# Patient Record
Sex: FEMALE | Race: WHITE | Employment: OTHER | URBAN - METROPOLITAN AREA
[De-identification: names, ages, dates, MRNs, and addresses within clinical notes are randomized per-mention and may not be internally consistent; named-entity substitution may affect disease eponyms.]

---

## 2017-01-03 RX ORDER — CLOPIDOGREL BISULFATE 75 MG/1
75 TABLET ORAL DAILY
Qty: 30 TAB | Refills: 6 | Status: SHIPPED | OUTPATIENT
Start: 2017-01-03 | End: 2017-06-15 | Stop reason: SDUPTHER

## 2017-03-13 RX ORDER — METOPROLOL TARTRATE 50 MG/1
50 TABLET ORAL 2 TIMES DAILY
Qty: 60 TAB | Refills: 6 | Status: SHIPPED | OUTPATIENT
Start: 2017-03-13 | End: 2018-04-17 | Stop reason: ALTCHOICE

## 2017-04-11 RX ORDER — AMLODIPINE BESYLATE 5 MG/1
5 TABLET ORAL DAILY
Qty: 30 TAB | Refills: 6 | Status: SHIPPED | OUTPATIENT
Start: 2017-04-11 | End: 2017-07-10 | Stop reason: SDUPTHER

## 2017-04-11 RX ORDER — SIMVASTATIN 40 MG/1
40 TABLET, FILM COATED ORAL
Qty: 30 TAB | Refills: 6 | Status: SHIPPED | OUTPATIENT
Start: 2017-04-11 | End: 2017-08-03 | Stop reason: SDUPTHER

## 2017-05-20 LAB
ALBUMIN SERPL-MCNC: 4.5 G/DL (ref 3.6–4.8)
ALP SERPL-CCNC: 66 IU/L (ref 39–117)
ALT SERPL-CCNC: 12 IU/L (ref 0–32)
AST SERPL-CCNC: 16 IU/L (ref 0–40)
BILIRUB DIRECT SERPL-MCNC: 0.18 MG/DL (ref 0–0.4)
BILIRUB SERPL-MCNC: 0.6 MG/DL (ref 0–1.2)
CHOLEST SERPL-MCNC: 183 MG/DL (ref 100–199)
HDLC SERPL-MCNC: 54 MG/DL
LDLC SERPL CALC-MCNC: 86 MG/DL (ref 0–99)
PROT SERPL-MCNC: 6.9 G/DL (ref 6–8.5)
TRIGL SERPL-MCNC: 216 MG/DL (ref 0–149)
TSH SERPL DL<=0.005 MIU/L-ACNC: 3.03 UIU/ML (ref 0.45–4.5)
VLDLC SERPL CALC-MCNC: 43 MG/DL (ref 5–40)

## 2017-06-06 ENCOUNTER — OFFICE VISIT (OUTPATIENT)
Dept: CARDIOLOGY CLINIC | Age: 62
End: 2017-06-06

## 2017-06-06 VITALS — DIASTOLIC BLOOD PRESSURE: 73 MMHG | HEART RATE: 63 BPM | HEIGHT: 66 IN | SYSTOLIC BLOOD PRESSURE: 110 MMHG

## 2017-06-06 DIAGNOSIS — I10 ESSENTIAL HYPERTENSION: Chronic | ICD-10-CM

## 2017-06-06 DIAGNOSIS — Z95.5 PRESENCE OF STENT IN LAD CORONARY ARTERY: ICD-10-CM

## 2017-06-06 DIAGNOSIS — I25.118 CORONARY ARTERY DISEASE OF NATIVE ARTERY OF NATIVE HEART WITH STABLE ANGINA PECTORIS (HCC): Primary | ICD-10-CM

## 2017-06-06 DIAGNOSIS — I51.9 LV DYSFUNCTION: ICD-10-CM

## 2017-06-06 DIAGNOSIS — E78.5 HYPERLIPIDEMIA, UNSPECIFIED HYPERLIPIDEMIA TYPE: Chronic | ICD-10-CM

## 2017-06-06 RX ORDER — GLUCOSAM/CHONDRO/HERB 149/HYAL 750-100 MG
TABLET ORAL
COMMUNITY
End: 2018-09-27

## 2017-06-06 NOTE — PROGRESS NOTES
HISTORY OF PRESENT ILLNESS  Imelda You is a 57 y.o. female. Hypertension   The history is provided by the patient. This is a chronic problem. The current episode started more than 1 week ago. Associated symptoms include chest pain and shortness of breath. Chest Pain    The history is provided by the patient. This is a chronic problem. The current episode started more than 1 week ago. The problem has been rapidly improving. The problem occurs rarely. The pain is associated with exertion and normal activity. The pain is present in the substernal region. The pain is at a severity of 1/10. The pain is mild. The quality of the pain is described as tightness. The pain does not radiate. Associated symptoms include shortness of breath. Pertinent negatives include no claudication, no cough, no diaphoresis, no dizziness, no fever, no hemoptysis, no leg pain, no lower extremity edema, no malaise/fatigue, no nausea, no near-syncope, no orthopnea, no palpitations, no PND, no sputum production, no syncope, no vomiting and no weakness. She has tried rest and aspirin for the symptoms. The treatment provided moderate relief. Risk factors include cardiac disease, dyslipidemia and hypertension. Her past medical history is significant for HTN. Procedural history includes cardiac catheterization, echocardiogram and cardiac stents. Shortness of Breath   The history is provided by the patient. This is a chronic problem. The problem occurs rarely. The current episode started more than 1 week ago. The problem has been rapidly improving. Associated symptoms include chest pain. Pertinent negatives include no fever, no ear pain, no neck pain, no cough, no sputum production, no hemoptysis, no wheezing, no PND, no orthopnea, no syncope, no vomiting, no rash, no leg pain, no leg swelling and no claudication. She has had prior hospitalizations. She has had prior ED visits. Associated medical issues include CAD.  Associated medical issues do not include heart failure. Review of Systems   Constitutional: Negative for chills, diaphoresis, fever, malaise/fatigue and weight loss. HENT: Negative for congestion, ear discharge, ear pain, hearing loss, nosebleeds and tinnitus. Eyes: Negative for blurred vision. Respiratory: Positive for shortness of breath. Negative for cough, hemoptysis, sputum production, wheezing and stridor. Cardiovascular: Positive for chest pain. Negative for palpitations, orthopnea, claudication, leg swelling, syncope, PND and near-syncope. Gastrointestinal: Negative for heartburn, nausea and vomiting. Musculoskeletal: Negative for myalgias and neck pain. Skin: Negative for itching and rash. Neurological: Negative for dizziness, tingling, tremors, focal weakness, loss of consciousness and weakness. Psychiatric/Behavioral: Negative for depression and suicidal ideas.      Family History   Problem Relation Age of Onset    Cancer Mother     Heart Disease Father     Hypertension Father     Elevated Lipids Father     Diabetes Neg Hx     Stroke Neg Hx        Past Medical History:   Diagnosis Date    CAD (coronary artery disease)     HTN (hypertension)     Hyperlipidemia     IC (interstitial cystitis)        Past Surgical History:   Procedure Laterality Date    CARDIAC SURG PROCEDURE UNLIST      HX APPENDECTOMY      HX CHOLECYSTECTOMY      HX CORONARY STENT PLACEMENT  2007    HX CORONARY STENT PLACEMENT      HX TUBAL LIGATION Bilateral        Social History   Substance Use Topics    Smoking status: Never Smoker    Smokeless tobacco: Never Used    Alcohol use Yes      Comment: socially       Allergies   Allergen Reactions    Adhesive Rash    Macrobid [Nitrofurantoin Monohyd/M-Cryst] Nausea and Vomiting       Outpatient Prescriptions Marked as Taking for the 6/6/17 encounter (Office Visit) with Dipika Hawthorne MD   Medication Sig Dispense Refill    Omega-3-DHA-EPA-Fish Oil 1,000 mg (120 mg-180 mg) cap Take  by mouth.  simvastatin (ZOCOR) 40 mg tablet Take 1 Tab by mouth nightly. 30 Tab 6    amLODIPine (NORVASC) 5 mg tablet Take 1 Tab by mouth daily. 30 Tab 6    metoprolol tartrate (LOPRESSOR) 50 mg tablet Take 1 Tab by mouth two (2) times a day. 60 Tab 6    clopidogrel (PLAVIX) 75 mg tab Take 1 Tab by mouth daily. 30 Tab 6    ondansetron (ZOFRAN ODT) 4 mg disintegrating tablet Take 1 Tab by mouth every eight (8) hours as needed for Nausea. 14 Tab 0    oxyCODONE-acetaminophen (PERCOCET) 5-325 mg per tablet Take 1 Tab by mouth every six (6) hours as needed for Pain. Max Daily Amount: 4 Tabs. 16 Tab 0    traZODone (DESYREL) 100 mg tablet Take 100 mg by mouth as needed.  diazepam (VALIUM) 2 mg tablet Take 1 Tab by mouth every six (6) hours as needed for Anxiety (and spasms). Max Daily Amount: 8 mg. 30 Tab 0    pentosan polysulfate sodium (ELMIRON) 100 mg capsule Take 1 Cap by mouth Before breakfast, lunch, and dinner. Indications: INTERSTITIAL CYSTITIS 90 Cap 3    mirabegron ER (MYRBETRIQ) 25 mg ER tablet Take 1 Tab by mouth daily. Indications: BLADDER HYPERACTIVITY 30 Tab 4    gabapentin (NEURONTIN) 100 mg capsule Take 1 Cap by mouth three (3) times daily. 120 Cap 6    nitroglycerin (NITROSTAT) 0.4 mg SL tablet by SubLINGual route every five (5) minutes as needed for Chest Pain.  ALPRAZolam (XANAX) 0.5 mg tablet Take  by mouth.  temazepam (RESTORIL) 30 mg capsule Take  by mouth nightly as needed for Sleep.  phenazopyridine (PYRIDIUM) 100 mg tablet Take 100 mg by mouth as needed.  MTH/ME BLUE/SOD PHOS/PHEN/HYOS (URIBEL PO) Take  by mouth.  promethazine (PHENERGAN) 25 mg tablet Take 1 tablet by mouth every six (6) hours as needed. 25 tablet 0    aspirin 81 mg chewable tablet Take 81 mg by mouth daily.  fluticasone-salmeterol (ADVAIR DISKUS) 250-50 mcg/dose diskus inhaler Take 1 Puff by inhalation every twelve (12) hours.       albuterol (PROAIR HFA) 90 mcg/actuation inhaler Take  by inhalation. Visit Vitals    /73    Pulse 63    Ht 5' 6\" (1.676 m)     Physical Exam   Constitutional: She is oriented to person, place, and time. She appears well-developed and well-nourished. No distress. HENT:   Head: Atraumatic. Mouth/Throat: No oropharyngeal exudate. Eyes: Conjunctivae are normal. Right eye exhibits no discharge. Left eye exhibits no discharge. No scleral icterus. Neck: Normal range of motion. Neck supple. No JVD present. No tracheal deviation present. No thyromegaly present. Cardiovascular: Normal rate, regular rhythm and normal heart sounds. Exam reveals no gallop. No murmur heard. Pulmonary/Chest: Effort normal and breath sounds normal. No stridor. No respiratory distress. She has no wheezes. She has no rales. She exhibits no tenderness. Abdominal: Soft. There is no tenderness. There is no rebound. Musculoskeletal: Normal range of motion. She exhibits no edema or tenderness. Lymphadenopathy:     She has no cervical adenopathy. Neurological: She is alert and oriented to person, place, and time. She exhibits normal muscle tone. Skin: Skin is warm. She is not diaphoretic. Psychiatric: She has a normal mood and affect. Her behavior is normal.     ekg sinus rhythm with t wave inversion in anterior and inferior leads  ASSESSMENT and PLAN    ICD-10-CM ICD-9-CM    1. Coronary artery disease of native artery of native heart with stable angina pectoris ([facility] [address] I25.118 414.01      413.9    2. Essential hypertension I10 401.9    3. Hyperlipidemia, unspecified hyperlipidemia type E78.5 272.4    4. LV dysfunction I51.9 429.9 2D ECHO COMPLETE ADULT (TTE)   5.  Presence of stent in LAD coronary artery Z95.5 V45.82      Orders Placed This Encounter    2D ECHO COMPLETE ADULT (TTE)     Standing Status:   Future     Standing Expiration Date:   12/6/2017     Order Specific Question:   Reason for Exam:     Answer:   LV dysfunction Follow-up Disposition:  Return in about 6 months (around 12/6/2017). cardiovascular risk and specific lipid/LDL goals reviewed  use of aspirin to prevent MI and TIA's discussed. Patient  s/p PTCA for severe instent stenosis to LAD. Now significantly better. Continue aspirin and plavix. Continue intense risk factor modification. Lab reports reviewed with patient -- continue risk factor modification.   Will obtain echo prior to next visit to assess LV function

## 2017-06-06 NOTE — MR AVS SNAPSHOT
Visit Information Date & Time Provider Department Dept. Phone Encounter #  
 6/6/2017  1:45 PM Brayan Toledo MD [facility] [id number] Follow-up Instructions Return in about 6 months (around 12/6/2017). Your Appointments 11/15/2017  1:30 PM  
PROCEDURE with [facility] ([address]) Appt Note: echo george blanka [address] [facility], [address] [facility] [address]  
[address], [address] [address]  
  
    
 12/5/2017  1:00 PM  
Office Visit with Brayan Toledo MD  
[facility]) Appt Note: [address] [facility], [address] [facility] [address]  
[address], [address] [place] [address] Upcoming Health Maintenance Date Due Hepatitis C Screening 1955 Pneumococcal 19-64 Medium Risk (1 of 1 - PPSV23) 6/24/1973 DTaP/Tdap/Td series (1 - Tdap) 6/24/1975 FOBT Q 1 YEAR AGE 50-75 6/24/2004 ZOSTER VACCINE AGE 60> 6/24/2014 PAP AKA CERVICAL CYTOLOGY 9/5/2016 BREAST CANCER SCRN MAMMOGRAM 5/26/2017 INFLUENZA AGE 9 TO ADULT 8/1/2017 Allergies as of 6/6/2017  Review Complete On: 6/6/2017 By: Brayan Toledo MD  
  
 Severity Noted Reaction Type Reactions Adhesive  07/20/2015    Rash Macrobid [Nitrofurantoin Monohyd/m-cryst]  07/20/2015    Nausea and Vomiting Current Immunizations  Reviewed on 8/17/2016 No immunizations on file. Not reviewed this visit You Were Diagnosed With   
  
 Codes Comments Coronary artery disease of native artery of native heart with stable angina pectoris ([facility] [address]    -  Primary ICD-10-CM: I25.118 
ICD-9-CM: 414.01, 413.9 Essential hypertension     ICD-10-CM: I10 
ICD-9-CM: 401.9 Hyperlipidemia, unspecified hyperlipidemia type     ICD-10-CM: E78.5 ICD-9-CM: 272.4 LV dysfunction     ICD-10-CM: I51.9 ICD-9-CM: 429.9 Presence of stent in LAD coronary artery     ICD-10-CM: Z95.5 ICD-9-CM: V45.82 Vitals BP Pulse Height(growth percentile) OB Status Smoking Status 110/73 63 5' 6\" (1.676 m) Menopause Never Smoker Vitals History Preferred Pharmacy Pharmacy Name Phone [phone] [facility], [address] [facility] Your Updated Medication List  
  
   
This list is accurate as of: 6/6/17  2:22 PM.  Always use your most recent med list.  
  
  
  
  
 Maranda Spire 250-50 mcg/dose diskus inhaler Generic drug:  fluticasone-salmeterol Take 1 Puff by inhalation every twelve (12) hours. amLODIPine 5 mg tablet Commonly known as:  Naomi Half Take 1 Tab by mouth daily. aspirin 81 mg chewable tablet Take 81 mg by mouth daily. clopidogrel 75 mg Tab Commonly known as:  PLAVIX Take 1 Tab by mouth daily. diazePAM 2 mg tablet Commonly known as:  VALIUM Take 1 Tab by mouth every six (6) hours as needed for Anxiety (and spasms). Max Daily Amount: 8 mg.  
  
 gabapentin 100 mg capsule Commonly known as:  NEURONTIN Take 1 Cap by mouth three (3) times daily. metoprolol tartrate 50 mg tablet Commonly known as:  LOPRESSOR Take 1 Tab by mouth two (2) times a day. mirabegron ER 25 mg ER tablet Commonly known as:  MYRBETRIQ Take 1 Tab by mouth daily. Indications: BLADDER HYPERACTIVITY  
  
 NITROSTAT 0.4 mg SL tablet Generic drug:  nitroglycerin  
by SubLINGual route every five (5) minutes as needed for Chest Pain. Omega-3-DHA-EPA-Fish Oil 1,000 mg (120 mg-180 mg) Cap Take  by mouth. ondansetron 4 mg disintegrating tablet Commonly known as:  ZOFRAN ODT Take 1 Tab by mouth every eight (8) hours as needed for Nausea. oxyCODONE-acetaminophen 5-325 mg per tablet Commonly known as:  PERCOCET Take 1 Tab by mouth every six (6) hours as needed for Pain. Max Daily Amount: 4 Tabs. pentosan polysulfate sodium 100 mg capsule Commonly known as:  Zabrina Jaime Take 1 Cap by mouth Before breakfast, lunch, and dinner. Indications: INTERSTITIAL CYSTITIS  
  
 PROAIR HFA 90 mcg/actuation inhaler Generic drug:  albuterol Take  by inhalation. promethazine 25 mg tablet Commonly known as:  PHENERGAN Take 1 tablet by mouth every six (6) hours as needed. PYRIDIUM 100 mg tablet Generic drug:  phenazopyridine Take 100 mg by mouth as needed. RESTORIL 30 mg capsule Generic drug:  temazepam  
Take  by mouth nightly as needed for Sleep.  
  
 simvastatin 40 mg tablet Commonly known as:  ZOCOR Take 1 Tab by mouth nightly. traZODone 100 mg tablet Commonly known as:  [place] Take 100 mg by mouth as needed. URIBEL PO Take  by mouth. XANAX 0.5 mg tablet Generic drug:  ALPRAZolam  
Take  by mouth. Follow-up Instructions Return in about 6 months (around 12/6/2017). To-Do List   
 12/06/2017 Cardiac Services:  2D ECHO COMPLETE ADULT (TTE) Introducing [facility]! Arabella Ayala introduces [employer] patient portal. Now you can access parts of your medical record, email your doctor's office, and request medication refills online. 1. In your internet browser, go to [email or web address] 2. Click on the First Time User? Click Here link in the Sign In box. You will see the New Member Sign Up page. 3. Enter your [employer] Access Code exactly as it appears below. You will not need to use this code after youve completed the sign-up process. If you do not sign up before the expiration date, you must request a new code. · [employer] Access Code: [email or web address] Expires: 9/4/2017  1:36 PM 
 
4. Enter the last four digits of your Social Security Number (xxxx) and Date of Birth (mm/dd/yyyy) as indicated and click Submit. You will be taken to the next sign-up page. 5. Create a yeppt ID. This will be your yeppt login ID and cannot be changed, so think of one that is secure and easy to remember. 6. Create a yeppt password. You can change your password at any time. 7. Enter your Password Reset Question and Answer. This can be used at a later time if you forget your password. 8. Enter your e-mail address. You will receive e-mail notification when new information is available in [address] 9. Click Sign Up. You can now view and download portions of your medical record. 10. Click the Download Summary menu link to download a portable copy of your medical information. If you have questions, please visit the Frequently Asked Questions section of the yeppt website. Remember, yeppt is NOT to be used for urgent needs. For medical emergencies, dial 911. Now available from your iPhone and Android! Please provide this summary of care documentation to your next provider. Your primary care clinician is listed as NONE. If you have any questions after today's visit, please call [phone].

## 2017-06-06 NOTE — PROGRESS NOTES
1. Have you been to the ER, urgent care clinic since your last visit? Hospitalized since your last visit?     no  2. Have you seen or consulted any other health care providers outside of the [address] [place] [address] since your last visit? Include any pap smears or colon screening. urgent care, Dr Delores Marquez  3. Since your last visit, have you had any of the following symptoms? Light headiness some time          4. Have you had any blood work, X-rays or cardiac testing?    no        5. Where do you normally have your labs drawn? [facility]    6. Do you need any refills today?      no

## 2017-06-15 RX ORDER — CLOPIDOGREL BISULFATE 75 MG/1
75 TABLET ORAL DAILY
Qty: 30 TAB | Refills: 6 | Status: SHIPPED | OUTPATIENT
Start: 2017-06-15 | End: 2017-10-23 | Stop reason: SDUPTHER

## 2017-07-10 RX ORDER — AMLODIPINE BESYLATE 5 MG/1
TABLET ORAL
Qty: 30 TAB | Refills: 3 | Status: SHIPPED | OUTPATIENT
Start: 2017-07-10 | End: 2017-10-21 | Stop reason: SDUPTHER

## 2017-08-03 ENCOUNTER — TELEPHONE (OUTPATIENT)
Dept: CARDIOLOGY CLINIC | Age: 62
End: 2017-08-03

## 2017-08-03 RX ORDER — SIMVASTATIN 40 MG/1
40 TABLET, FILM COATED ORAL
Qty: 30 TAB | Refills: 6 | Status: SHIPPED | OUTPATIENT
Start: 2017-08-03 | End: 2017-12-14 | Stop reason: SDUPTHER

## 2017-08-03 NOTE — TELEPHONE ENCOUNTER
Patient will be having surgery to remove a lesion on her neck. Not yet scheduled. Can she stop her Plavix and Aspirin 5-7 days prior.

## 2017-08-04 NOTE — TELEPHONE ENCOUNTER
Called and left message on patient voicemail Per Dr. Michael Sanchez patient can hold asa and Plavix and resume ASAP post procedure. If any questions call ofice.

## 2017-09-18 ENCOUNTER — HOSPITAL ENCOUNTER (OUTPATIENT)
Dept: LAB | Age: 62
Discharge: HOME | End: 2017-09-18
Payer: COMMERCIAL

## 2017-09-18 DIAGNOSIS — Z01.818 PRE-OP EVALUATION: ICD-10-CM

## 2017-09-18 LAB
ANION GAP SERPL CALC-SCNC: 8 MMOL/L (ref 3–18)
BUN SERPL-MCNC: 13 MG/DL (ref 7–18)
BUN/CREAT SERPL: 15 (ref 12–20)
CALCIUM SERPL-MCNC: 8.8 MG/DL (ref 8.5–10.1)
CHLORIDE SERPL-SCNC: 106 MMOL/L (ref 100–108)
CO2 SERPL-SCNC: 29 MMOL/L (ref 21–32)
CREAT SERPL-MCNC: 0.89 MG/DL (ref 0.6–1.3)
ERYTHROCYTE [DISTWIDTH] IN BLOOD BY AUTOMATED COUNT: 12.8 % (ref 11.6–14.5)
GLUCOSE SERPL-MCNC: 104 MG/DL (ref 74–99)
HCT VFR BLD AUTO: 39.9 % (ref 35–45)
HGB BLD-MCNC: 13.5 G/DL (ref 12–16)
MCH RBC QN AUTO: 31.5 PG (ref 24–34)
MCHC RBC AUTO-ENTMCNC: 33.8 G/DL (ref 31–37)
MCV RBC AUTO: 93.2 FL (ref 74–97)
PLATELET # BLD AUTO: 202 K/UL (ref 135–420)
PMV BLD AUTO: 11.7 FL (ref 9.2–11.8)
POTASSIUM SERPL-SCNC: 3.7 MMOL/L (ref 3.5–5.5)
RBC # BLD AUTO: 4.28 M/UL (ref 4.2–5.3)
SODIUM SERPL-SCNC: 143 MMOL/L (ref 136–145)
WBC # BLD AUTO: 7.1 K/UL (ref 4.6–13.2)

## 2017-09-18 PROCEDURE — 93005 ELECTROCARDIOGRAM TRACING: CPT

## 2017-09-19 ENCOUNTER — HOSPITAL ENCOUNTER (OUTPATIENT)
Dept: LAB | Age: 62
Discharge: HOME | End: 2017-09-19
Payer: COMMERCIAL

## 2017-09-19 LAB
ATRIAL RATE: 70 BPM
CALCULATED P AXIS, ECG09: 21 DEGREES
CALCULATED R AXIS, ECG10: -11 DEGREES
CALCULATED T AXIS, ECG11: 20 DEGREES
DIAGNOSIS, 93000: NORMAL
P-R INTERVAL, ECG05: 194 MS
Q-T INTERVAL, ECG07: 422 MS
QRS DURATION, ECG06: 84 MS
QTC CALCULATION (BEZET), ECG08: 455 MS
VENTRICULAR RATE, ECG03: 70 BPM

## 2017-09-19 PROCEDURE — 88305 TISSUE EXAM BY PATHOLOGIST: CPT | Performed by: SURGERY

## 2017-09-19 PROCEDURE — 88331 PATH CONSLTJ SURG 1 BLK 1SPC: CPT | Performed by: SURGERY

## 2017-10-09 ENCOUNTER — HOSPITAL ENCOUNTER (OUTPATIENT)
Age: 62
Setting detail: OBSERVATION
Discharge: HOME | End: 2017-10-10
Attending: EMERGENCY MEDICINE | Admitting: INTERNAL MEDICINE
Payer: COMMERCIAL

## 2017-10-09 ENCOUNTER — APPOINTMENT (OUTPATIENT)
Dept: GENERAL RADIOLOGY | Age: 62
End: 2017-10-09
Attending: EMERGENCY MEDICINE
Payer: COMMERCIAL

## 2017-10-09 DIAGNOSIS — R07.9 CHEST PAIN, UNSPECIFIED TYPE: Primary | ICD-10-CM

## 2017-10-09 DIAGNOSIS — R06.00 DYSPNEA, UNSPECIFIED TYPE: ICD-10-CM

## 2017-10-09 LAB
ANION GAP SERPL CALC-SCNC: 10 MMOL/L (ref 3–18)
ATRIAL RATE: 49 BPM
ATRIAL RATE: 56 BPM
BASOPHILS # BLD: 0 K/UL (ref 0–0.06)
BASOPHILS NFR BLD: 1 % (ref 0–2)
BUN SERPL-MCNC: 14 MG/DL (ref 7–18)
BUN/CREAT SERPL: 15 (ref 12–20)
CALCIUM SERPL-MCNC: 8.6 MG/DL (ref 8.5–10.1)
CALCULATED P AXIS, ECG09: 20 DEGREES
CALCULATED P AXIS, ECG09: 25 DEGREES
CALCULATED R AXIS, ECG10: 14 DEGREES
CALCULATED T AXIS, ECG11: 69 DEGREES
CALCULATED T AXIS, ECG11: 71 DEGREES
CHLORIDE SERPL-SCNC: 106 MMOL/L (ref 100–108)
CK MB CFR SERPL CALC: NORMAL % (ref 0–4)
CK MB SERPL-MCNC: <1 NG/ML (ref 5–25)
CK SERPL-CCNC: 79 U/L (ref 26–192)
CO2 SERPL-SCNC: 24 MMOL/L (ref 21–32)
CREAT SERPL-MCNC: 0.91 MG/DL (ref 0.6–1.3)
DIAGNOSIS, 93000: NORMAL
DIAGNOSIS, 93000: NORMAL
DIFFERENTIAL METHOD BLD: ABNORMAL
EOSINOPHIL # BLD: 0.3 K/UL (ref 0–0.4)
EOSINOPHIL NFR BLD: 5 % (ref 0–5)
ERYTHROCYTE [DISTWIDTH] IN BLOOD BY AUTOMATED COUNT: 12.2 % (ref 11.6–14.5)
GLUCOSE SERPL-MCNC: 123 MG/DL (ref 74–99)
HCT VFR BLD AUTO: 38 % (ref 35–45)
HGB BLD-MCNC: 13 G/DL (ref 12–16)
LYMPHOCYTES # BLD: 1.7 K/UL (ref 0.9–3.6)
LYMPHOCYTES NFR BLD: 29 % (ref 21–52)
MCH RBC QN AUTO: 31.3 PG (ref 24–34)
MCHC RBC AUTO-ENTMCNC: 34.2 G/DL (ref 31–37)
MCV RBC AUTO: 91.6 FL (ref 74–97)
MONOCYTES # BLD: 0.6 K/UL (ref 0.05–1.2)
MONOCYTES NFR BLD: 11 % (ref 3–10)
NEUTS SEG # BLD: 3.2 K/UL (ref 1.8–8)
NEUTS SEG NFR BLD: 54 % (ref 40–73)
P-R INTERVAL, ECG05: 204 MS
P-R INTERVAL, ECG05: 208 MS
PLATELET # BLD AUTO: 207 K/UL (ref 135–420)
PMV BLD AUTO: 10.9 FL (ref 9.2–11.8)
POTASSIUM SERPL-SCNC: 3.7 MMOL/L (ref 3.5–5.5)
Q-T INTERVAL, ECG07: 452 MS
Q-T INTERVAL, ECG07: 486 MS
QRS DURATION, ECG06: 80 MS
QRS DURATION, ECG06: 84 MS
QTC CALCULATION (BEZET), ECG08: 436 MS
QTC CALCULATION (BEZET), ECG08: 439 MS
RBC # BLD AUTO: 4.15 M/UL (ref 4.2–5.3)
SODIUM SERPL-SCNC: 140 MMOL/L (ref 136–145)
TROPONIN I SERPL-MCNC: <0.02 NG/ML (ref 0–0.04)
TROPONIN I SERPL-MCNC: <0.02 NG/ML (ref 0–0.06)
VENTRICULAR RATE, ECG03: 49 BPM
VENTRICULAR RATE, ECG03: 56 BPM
WBC # BLD AUTO: 5.8 K/UL (ref 4.6–13.2)

## 2017-10-09 PROCEDURE — 36415 COLL VENOUS BLD VENIPUNCTURE: CPT | Performed by: INTERNAL MEDICINE

## 2017-10-09 PROCEDURE — 74011250636 HC RX REV CODE- 250/636: Performed by: EMERGENCY MEDICINE

## 2017-10-09 PROCEDURE — 96361 HYDRATE IV INFUSION ADD-ON: CPT

## 2017-10-09 PROCEDURE — 74011250636 HC RX REV CODE- 250/636: Performed by: INTERNAL MEDICINE

## 2017-10-09 PROCEDURE — 74011250637 HC RX REV CODE- 250/637: Performed by: INTERNAL MEDICINE

## 2017-10-09 PROCEDURE — 99218 HC RM OBSERVATION: CPT

## 2017-10-09 PROCEDURE — 93005 ELECTROCARDIOGRAM TRACING: CPT

## 2017-10-09 PROCEDURE — 84484 ASSAY OF TROPONIN QUANT: CPT

## 2017-10-09 PROCEDURE — 80048 BASIC METABOLIC PNL TOTAL CA: CPT

## 2017-10-09 PROCEDURE — 99285 EMERGENCY DEPT VISIT HI MDM: CPT

## 2017-10-09 PROCEDURE — 96360 HYDRATION IV INFUSION INIT: CPT

## 2017-10-09 PROCEDURE — 85025 COMPLETE CBC W/AUTO DIFF WBC: CPT

## 2017-10-09 PROCEDURE — 71010 XR CHEST PORT: CPT

## 2017-10-09 PROCEDURE — 74011250637 HC RX REV CODE- 250/637: Performed by: EMERGENCY MEDICINE

## 2017-10-09 PROCEDURE — 94762 N-INVAS EAR/PLS OXIMTRY CONT: CPT

## 2017-10-09 PROCEDURE — 96372 THER/PROPH/DIAG INJ SC/IM: CPT

## 2017-10-09 PROCEDURE — 82550 ASSAY OF CK (CPK): CPT | Performed by: INTERNAL MEDICINE

## 2017-10-09 RX ORDER — TRAZODONE HYDROCHLORIDE 50 MG/1
50 TABLET ORAL
Status: DISCONTINUED | OUTPATIENT
Start: 2017-10-09 | End: 2017-10-10 | Stop reason: HOSPADM

## 2017-10-09 RX ORDER — METOPROLOL TARTRATE 50 MG/1
50 TABLET ORAL 2 TIMES DAILY
Status: DISCONTINUED | OUTPATIENT
Start: 2017-10-09 | End: 2017-10-10 | Stop reason: HOSPADM

## 2017-10-09 RX ORDER — ACETAMINOPHEN 325 MG/1
650 TABLET ORAL
Status: DISCONTINUED | OUTPATIENT
Start: 2017-10-09 | End: 2017-10-10 | Stop reason: HOSPADM

## 2017-10-09 RX ORDER — GUAIFENESIN 100 MG/5ML
81 LIQUID (ML) ORAL DAILY
Status: DISCONTINUED | OUTPATIENT
Start: 2017-10-10 | End: 2017-10-10 | Stop reason: HOSPADM

## 2017-10-09 RX ORDER — SODIUM CHLORIDE 9 MG/ML
75 INJECTION, SOLUTION INTRAVENOUS CONTINUOUS
Status: DISCONTINUED | OUTPATIENT
Start: 2017-10-09 | End: 2017-10-10

## 2017-10-09 RX ORDER — NITROGLYCERIN 0.4 MG/1
0.4 TABLET SUBLINGUAL
Status: COMPLETED | OUTPATIENT
Start: 2017-10-09 | End: 2017-10-09

## 2017-10-09 RX ORDER — ALBUTEROL SULFATE 90 UG/1
1 AEROSOL, METERED RESPIRATORY (INHALATION)
Status: DISCONTINUED | OUTPATIENT
Start: 2017-10-09 | End: 2017-10-09 | Stop reason: CLARIF

## 2017-10-09 RX ORDER — NITROGLYCERIN 0.4 MG/1
0.4 TABLET SUBLINGUAL AS NEEDED
Status: DISCONTINUED | OUTPATIENT
Start: 2017-10-09 | End: 2017-10-10 | Stop reason: HOSPADM

## 2017-10-09 RX ORDER — ONDANSETRON 4 MG/1
4 TABLET, ORALLY DISINTEGRATING ORAL
Status: DISCONTINUED | OUTPATIENT
Start: 2017-10-09 | End: 2017-10-10 | Stop reason: HOSPADM

## 2017-10-09 RX ORDER — GUAIFENESIN 100 MG/5ML
324 LIQUID (ML) ORAL
Status: COMPLETED | OUTPATIENT
Start: 2017-10-09 | End: 2017-10-09

## 2017-10-09 RX ORDER — AMLODIPINE BESYLATE 5 MG/1
5 TABLET ORAL DAILY
Status: DISCONTINUED | OUTPATIENT
Start: 2017-10-10 | End: 2017-10-10 | Stop reason: HOSPADM

## 2017-10-09 RX ORDER — CLOPIDOGREL BISULFATE 75 MG/1
75 TABLET ORAL DAILY
Status: DISCONTINUED | OUTPATIENT
Start: 2017-10-10 | End: 2017-10-10 | Stop reason: HOSPADM

## 2017-10-09 RX ORDER — SIMVASTATIN 40 MG/1
40 TABLET, FILM COATED ORAL
Status: DISCONTINUED | OUTPATIENT
Start: 2017-10-09 | End: 2017-10-10 | Stop reason: HOSPADM

## 2017-10-09 RX ORDER — HEPARIN SODIUM 5000 [USP'U]/ML
5000 INJECTION, SOLUTION INTRAVENOUS; SUBCUTANEOUS EVERY 8 HOURS
Status: DISCONTINUED | OUTPATIENT
Start: 2017-10-09 | End: 2017-10-10 | Stop reason: HOSPADM

## 2017-10-09 RX ORDER — ACETAMINOPHEN 325 MG/1
650 TABLET ORAL
Status: COMPLETED | OUTPATIENT
Start: 2017-10-09 | End: 2017-10-09

## 2017-10-09 RX ORDER — MORPHINE SULFATE 2 MG/ML
1 INJECTION, SOLUTION INTRAMUSCULAR; INTRAVENOUS
Status: DISCONTINUED | OUTPATIENT
Start: 2017-10-09 | End: 2017-10-10

## 2017-10-09 RX ORDER — ALBUTEROL SULFATE 0.83 MG/ML
2.5 SOLUTION RESPIRATORY (INHALATION)
Status: DISCONTINUED | OUTPATIENT
Start: 2017-10-09 | End: 2017-10-10 | Stop reason: HOSPADM

## 2017-10-09 RX ADMIN — SIMVASTATIN 40 MG: 40 TABLET, FILM COATED ORAL at 21:48

## 2017-10-09 RX ADMIN — ASPIRIN 81 MG 324 MG: 81 TABLET ORAL at 15:21

## 2017-10-09 RX ADMIN — TRAZODONE HYDROCHLORIDE 50 MG: 50 TABLET ORAL at 21:48

## 2017-10-09 RX ADMIN — ACETAMINOPHEN 650 MG: 325 TABLET ORAL at 16:28

## 2017-10-09 RX ADMIN — SODIUM CHLORIDE 500 ML: 900 INJECTION, SOLUTION INTRAVENOUS at 14:52

## 2017-10-09 RX ADMIN — HEPARIN SODIUM 5000 UNITS: 5000 INJECTION INTRAVENOUS; SUBCUTANEOUS at 21:47

## 2017-10-09 RX ADMIN — NITROGLYCERIN 0.4 MG: 0.4 TABLET SUBLINGUAL at 14:32

## 2017-10-09 RX ADMIN — SODIUM CHLORIDE 75 ML/HR: 900 INJECTION, SOLUTION INTRAVENOUS at 21:56

## 2017-10-09 RX ADMIN — SODIUM CHLORIDE 500 ML: 900 INJECTION, SOLUTION INTRAVENOUS at 14:32

## 2017-10-09 NOTE — IP AVS SNAPSHOT
[address] Patient: Nilsa Fenton MRN: [id number] GCA:8/40/1126 You are allergic to the following Allergen Reactions Latex, Natural Rubber Rash Adhesive Rash Macrobid (Nitrofurantoin Monohyd/M-Cryst) Nausea and Vomiting Recent Documentation Height Weight BMI OB Status Smoking Status 1.676 m 89.7 kg 31.91 kg/m2 Menopause Never Smoker Emergency Contacts Name Discharge Info Relation Home Work Mobile [address] [3] [phone] Dr Benson Economy [3] [phone] Dr Denis  Spouse [3] [phone] About your hospitalization You were admitted on:  October 9, 2017 You last received care in the:  [address] [place] [address] You were discharged on:  October 10, 2017 Unit phone number:  [phone] Why you were hospitalized Your primary diagnosis was: Atypical Chest Pain Your diagnoses also included:  Asthma, Hypertension, Cad (Coronary Artery Disease), Hyperlipidemia, Presence Of Stent In Lad Coronary Artery Providers Seen During Your Hospitalizations Provider Role Specialty Primary office phone Casandra Thompson DO Attending Provider Emergency Medicine [phone] Jean Stahl MD Attending Provider Internal Medicine [phone] Your Primary Care Physician (PCP) Primary Care Physician Office Phone Office Fax Porsha Burciaga [phone] Follow-up Information Follow up With Details Comments Contact Info Tom Cary MD   [address] [place] [address] [facility] [address] [phone] BARBARA Pierce On 10/17/2017 Per office/Beernice appt. With BARBARA De Leon; Oct. 17, 2017 , @ 1:30pm ( please arrive @ 1:00 pm, also bring ID, Insurance caed, and current, medication. [address] [address] [phone] None   None (395) Patient stated that they have no PCP Your Appointments Tuesday October 17, 2017  1:30 PM EDT HOSPITAL FOLLOW-UP with BARBARA Alcala [facility] ([address]) Lisa Lopez [facility] [address]  
[phone] Wednesday November 15, 2017  1:30 PM EST PROCEDURE with [facility] [place] ([address]) [address] [place] [address], [address] [facility] [address]  
[phone] Current Discharge Medication List  
  
START taking these medications Dose & Instructions Dispensing Information Comments Morning Noon Evening Bedtime  
 raNITIdine 150 mg tablet Commonly known as:  ZANTAC Your last dose was: Your next dose is:    
   
   
 Dose:  150 mg Take 1 Tab by mouth two (2) times a day for 5 days. Quantity:  10 Tab Refills:  0 CONTINUE these medications which have NOT CHANGED Dose & Instructions Dispensing Information Comments Morning Noon Evening Bedtime  
 amLODIPine 5 mg tablet Commonly known as:  Nilesh Walsh Your last dose was: Your next dose is: TAKE 1 TAB BY MOUTH DAILY. Quantity:  30 Tab Refills:  3  
     
   
   
   
  
 aspirin 81 mg chewable tablet Your last dose was: Your next dose is:    
   
   
 Dose:  81 mg Take 81 mg by mouth daily. Refills:  0  
     
   
   
   
  
 clopidogrel 75 mg Tab Commonly known as:  PLAVIX Your last dose was: Your next dose is:    
   
   
 Dose:  75 mg Take 1 Tab by mouth daily. Quantity:  30 Tab Refills:  6  
     
   
   
   
  
 metoprolol tartrate 50 mg tablet Commonly known as:  LOPRESSOR Your last dose was: Your next dose is:    
   
   
 Dose:  50 mg Take 1 Tab by mouth two (2) times a day. Quantity:  60 Tab Refills:  6 NITROSTAT 0.4 mg SL tablet Generic drug:  nitroglycerin Your last dose was: Your next dose is:    
   
   
 by SubLINGual route every five (5) minutes as needed for Chest Pain. Refills:  0 Omega-3-DHA-EPA-Fish Oil 1,000 mg (120 mg-180 mg) Cap Your last dose was: Your next dose is: Take  by mouth. Refills:  0  
     
   
   
   
  
 ondansetron 4 mg disintegrating tablet Commonly known as:  ZOFRAN ODT Your last dose was: Your next dose is:    
   
   
 Dose:  4 mg Take 1 Tab by mouth every eight (8) hours as needed for Nausea. Quantity:  14 Tab Refills:  0 PROAIR HFA 90 mcg/actuation inhaler Generic drug:  albuterol Your last dose was: Your next dose is: Take  by inhalation. Refills:  0 PYRIDIUM 100 mg tablet Generic drug:  phenazopyridine Your last dose was: Your next dose is:    
   
   
 Dose:  100 mg Take 100 mg by mouth as needed. Refills:  0  
     
   
   
   
  
 simvastatin 40 mg tablet Commonly known as:  ZOCOR Your last dose was: Your next dose is:    
   
   
 Dose:  40 mg Take 1 Tab by mouth nightly. Quantity:  30 Tab Refills:  6  
     
   
   
   
  
 traZODone 100 mg tablet Commonly known as:  Nba Lipps Your last dose was: Your next dose is:    
   
   
 Dose:  100 mg Take 100 mg by mouth as needed. Refills:  0 STOP taking these medications RESTORIL 30 mg capsule Generic drug:  temazepam  
   
  
  
  
Where to Get Your Medications Information on where to get these meds will be given to you by the nurse or doctor. ! Ask your nurse or doctor about these medications  
  raNITIdine 150 mg tablet Discharge Instructions Angina: Care Instructions Your Care Instructions You have a problem called angina. Angina happens when there is not enough blood flow to your heart muscle. Angina is a sign of coronary artery disease (CAD). CAD occurs when blood vessels that supply the heart become narrowed. Having CAD increases your risk of a heart attack. Chest pain or pressure is the most common symptom of angina. But some people have other symptoms, like: 
· Pain, pressure, or a strange feeling in the back, neck, jaw, or upper belly, or in one or both shoulders or arms. · Shortness of breath. · Nausea or vomiting. · Lightheadedness or sudden weakness. · Fast or irregular heartbeat. Women are somewhat more likely than men to have angina symptoms like shortness of breath, nausea, and back or jaw pain. Angina can be dangerous. That's why it is important to pay attention to your symptoms. Know what is typical for you, learn how to control your symptoms, and understand when you need to get treatment. A change in your usual pattern of symptoms is an emergency. It may mean that you are having a heart attack. The doctor has checked you carefully, but problems can develop later. If you notice any problems or new symptoms, get medical treatment right away. Follow-up care is a key part of your treatment and safety. Be sure to make and go to all appointments, and call your doctor if you are having problems. It's also a good idea to know your test results and keep a list of the medicines you take. How can you care for yourself at home? Medicines · If your doctor has given you nitroglycerin for angina symptoms, keep it with you at all times. If you have symptoms, sit down and rest, and take the first dose of nitroglycerin as directed. If your symptoms get worse or are not getting better within 5 minutes, call 911 right away. Stay on the phone. The emergency  will give you further instructions. · If your doctor advises it, take 1 low-dose aspirin a day to prevent heart attack. · Be safe with medicines. Take your medicines exactly as prescribed. Call your doctor if you think you are having a problem with your medicine. You will get more details on the specific medicines your doctor prescribes. Lifestyle changes · Do not smoke. If you need help quitting, talk to your doctor about stop-smoking programs and medicines. These can increase your chances of quitting for good. · Eat a heart-healthy diet that is low in saturated fat and salt, and is high in fiber. Talk to your doctor or a dietitian about healthy eating. · Stay at a healthy weight. Or lose weight if you need to. Activity · Talk to your doctor about a level of activity that is safe for you. · If an activity causes angina symptoms, stop and rest. 
When should you call for help? Call 911 anytime you think you may need emergency care. For example, call if: 
· You passed out (lost consciousness). · You have symptoms of a heart attack. These may include: ¨ Chest pain or pressure, or a strange feeling in the chest. 
¨ Sweating. ¨ Shortness of breath. ¨ Nausea or vomiting. ¨ Pain, pressure, or a strange feeling in the back, neck, jaw, or upper belly or in one or both shoulders or arms. ¨ Lightheadedness or sudden weakness. ¨ A fast or irregular heartbeat. After you call 911, the  may tell you to chew 1 adult-strength or 2 to 4 low-dose aspirin. Wait for an ambulance. Do not try to drive yourself. · You have angina symptoms that do not go away with rest or are not getting better within 5 minutes after you take a dose of nitroglycerin. Call your doctor now or seek immediate medical care if: 
· You are having angina symptoms more often than usual, or they are different or worse than usual. 
· You feel dizzy or lightheaded, or you feel like you may faint. Watch closely for changes in your health, and be sure to contact your doctor if you have any problems. Where can you learn more? Go to http://florecita-georgie.info/. Enter H129 in the search box to learn more about \"Angina: Care Instructions. \" Current as of: April 3, 2017 Content Version: 11.3 © 9354-2826 [employer]. Care instructions adapted under license by [employer] (which disclaims liability or warranty for this information). If you have questions about a medical condition or this instruction, always ask your healthcare professional. [place] [address] any warranty or liability for your use of this information. Asthma Attack: Care Instructions Your Care Instructions During an asthma attack, the airways swell and narrow. This makes it hard to breathe. Severe asthma attacks can be life-threatening, but you can help prevent them by keeping your asthma under control and treating symptoms before they get bad. Symptoms include being short of breath, having chest tightness, coughing, and wheezing. Noting and treating these symptoms can also help you avoid future trips to the emergency room. The doctor has checked you carefully, but problems can develop later. If you notice any problems or new symptoms, get medical treatment right away. Follow-up care is a key part of your treatment and safety. Be sure to make and go to all appointments, and call your doctor if you are having problems. It's also a good idea to know your test results and keep a list of the medicines you take. How can you care for yourself at home? · Follow your asthma action plan to prevent and treat attacks. If you don't have an asthma action plan, work with your doctor to create one. · Take your asthma medicines exactly as prescribed. Talk to your doctor right away if you have any questions about how to take them. ¨ Use your quick-relief medicine when you have symptoms of an attack. Quick-relief medicine is usually an albuterol inhaler.  Some people need to use quick-relief medicine before they exercise. ¨ Take your controller medicine every day, not just when you have symptoms. Controller medicine is usually an inhaled corticosteroid. The goal is to prevent problems before they occur. Don't use your controller medicine to treat an attack that has already started. It doesn't work fast enough to help. ¨ If your doctor prescribed corticosteroid pills to use during an attack, take them exactly as prescribed. It may take hours for the pills to work, but they may make the episode shorter and help you breathe better. ¨ Keep your quick-relief medicine with you at all times. · Talk to your doctor before using other medicines. Some medicines, such as aspirin, can cause asthma attacks in some people. · If you have a peak flow meter, use it to check how well you are breathing. This can help you predict when an asthma attack is going to occur. Then you can take medicine to prevent the asthma attack or make it less severe. · Do not smoke or allow others to smoke around you. Avoid smoky places. Smoking makes asthma worse. If you need help quitting, talk to your doctor about stop-smoking programs and medicines. These can increase your chances of quitting for good. · Learn what triggers an asthma attack for you, and avoid the triggers when you can. Common triggers include colds, smoke, air pollution, dust, pollen, mold, pets, cockroaches, stress, and cold air. · Avoid colds and the flu. Get a pneumococcal vaccine shot. If you have had one before, ask your doctor if you need a second dose. Get a flu vaccine every fall. If you must be around people with colds or the flu, wash your hands often. When should you call for help? Call 911 anytime you think you may need emergency care. For example, call if: 
· You have severe trouble breathing. Call your doctor now or seek immediate medical care if: 
· Your symptoms do not get better after you have followed your asthma action plan. · You have new or worse trouble breathing. · Your coughing and wheezing get worse. · You cough up dark brown or bloody mucus (sputum). · You have a new or higher fever. Watch closely for changes in your health, and be sure to contact your doctor if: 
· You need to use quick-relief medicine on more than 2 days a week (unless it is just for exercise). · You cough more deeply or more often, especially if you notice more mucus or a change in the color of your mucus. · You are not getting better as expected. Where can you learn more? Go to http://florecita-georgie.info/. Enter C714 in the search box to learn more about \"Asthma Attack: Care Instructions. \" Current as of: March 25, 2017 Content Version: 11.3 © 7359-5219 [employer]. Care instructions adapted under license by [employer] (which disclaims liability or warranty for this information). If you have questions about a medical condition or this instruction, always ask your healthcare professional. [place] [address] any warranty or liability for your use of this information. Patient armband removed and shredded MyChart Activation Thank you for requesting access to [employer]. Please follow the instructions below to securely access and download your online medical record. [employer]Sync allows you to send messages to your doctor, view your test results, renew your prescriptions, schedule appointments, and more. How Do I Sign Up? 1. In your internet browser, go to [email or web address] 
2. Click on the First Time User? Click Here link in the Sign In box. You will be redirect to the New Member Sign Up page. 3. Enter your SavvySync Access Code exactly as it appears below. You will not need to use this code after youve completed the sign-up process. If you do not sign up before the expiration date, you must request a new code. SavvySync Access Code: [email or web address] Expires: 2018  4:39 PM (This is the date your [employer] access code will ) 4. Enter the last four digits of your Social Security Number (xxxx) and Date of Birth (mm/dd/yyyy) as indicated and click Submit. You will be taken to the next sign-up page. 5. Create a [employer] ID. This will be your [employer] login ID and cannot be changed, so think of one that is secure and easy to remember. 6. Create a [employer] password. You can change your password at any time. 7. Enter your Password Reset Question and Answer. This can be used at a later time if you forget your password. 8. Enter your e-mail address. You will receive e-mail notification when new information is available in [address] 9. Click Sign Up. You can now view and download portions of your medical record. 10. Click the Download Summary menu link to download a portable copy of your medical information. Additional Information If you have questions, please visit the Frequently Asked Questions section of the [employer] website at [email or web address] Remember, [employer]S is NOT to be used for urgent needs. For medical emergencies, dial 911. DISCHARGE SUMMARY from Nurse The following personal items are in your possession at time of discharge: 
 
Dental Appliances: None Visual Aid: Glasses, With patient Home Medications: None Jewelry: Nicolasa Herd Clothing: Pants, With patient, Shirt, Undergarments, Footwear Other Valuables: Cell Phone, Arva Level PATIENT INSTRUCTIONS: 
 
 
F-face looks uneven A-arms unable to move or move unevenly S-speech slurred or non-existent T-time-call 911 as soon as signs and symptoms begin-DO NOT go Back to bed or wait to see if you get better-TIME IS BRAIN. Warning Signs of HEART ATTACK Call 911 if you have these symptoms: 
? Chest discomfort. Most heart attacks involve discomfort in the center of the chest that lasts more than a few minutes, or that goes away and comes back. It can feel like uncomfortable pressure, squeezing, fullness, or pain. ? Discomfort in other areas of the upper body. Symptoms can include pain or discomfort in one or both arms, the back, neck, jaw, or stomach. ? Shortness of breath with or without chest discomfort. ? Other signs may include breaking out in a cold sweat, nausea, or lightheadedness. Don't wait more than five minutes to call [address]! Fast action can save your life. Calling 911 is almost always the fastest way to get lifesaving treatment. Emergency Medical Services staff can begin treatment when they arrive  up to an hour sooner than if someone gets to the hospital by car. The discharge information has been reviewed with the patient. The patient verbalized understanding. Discharge medications reviewed with the patient and appropriate educational materials and side effects teaching were provided. Discharge Instructions Attachments/References RANITIDINE (BY MOUTH) (ENGLISH) Discharge Orders Procedure Order Date Status Priority Quantity Spec Type Associated Dx DIET CARDIAC No options chosen 10/10/17 1520 Normal Routine 1 Questions: Additional options:  No options chosen [place] Announcement We are excited to announce that we are making your provider's discharge notes available to you in [employer]. You will see these notes when they are completed and signed by the physician that discharged you from your recent hospital stay.   If you have any questions or concerns about any information you see in NetMinder, please call the Health Information Department where you were seen or reach out to your Primary Care Provider for more information about your plan of care. Introducing [facility]! [facility] introduces NetMinder patient portal. Now you can access parts of your medical record, email your doctor's office, and request medication refills online. 1. In your internet browser, go to [email or web address] 2. Click on the First Time User? Click Here link in the Sign In box. You will see the New Member Sign Up page. 3. Enter your NetMinder Access Code exactly as it appears below. You will not need to use this code after youve completed the sign-up process. If you do not sign up before the expiration date, you must request a new code. · NetMinder Access Code: [email or web address] Expires: 1/8/2018  4:39 PM 
 
4. Enter the last four digits of your Social Security Number (xxxx) and Date of Birth (mm/dd/yyyy) as indicated and click Submit. You will be taken to the next sign-up page. 5. Create a NetMinder ID. This will be your NetMinder login ID and cannot be changed, so think of one that is secure and easy to remember. 6. Create a NetMinder password. You can change your password at any time. 7. Enter your Password Reset Question and Answer. This can be used at a later time if you forget your password. 8. Enter your e-mail address. You will receive e-mail notification when new information is available in [address] 9. Click Sign Up. You can now view and download portions of your medical record. 10. Click the Download Summary menu link to download a portable copy of your medical information. If you have questions, please visit the Frequently Asked Questions section of the NetMinder website. Remember, NetMinder is NOT to be used for urgent needs. For medical emergencies, dial 911. Now available from your iPhone and Android! General Information Please provide this summary of care documentation to your next provider. Patient Signature:  ____________________________________________________________ Date:  ____________________________________________________________  
  
Kristin Dianna Provider Signature:  ____________________________________________________________ Date:  ____________________________________________________________ More Information Ranitidine (By mouth) Ranitidine Hydrochloride (nn-LG-sl-mar carlos-eran-KLOR-charly) Treats and prevents heartburn. Also treats stomach ulcers, gastroesophageal reflux disease (GERD), and conditions that cause too much stomach acid. Brand Name(s): DermaSilkRx Anodynexa Chris, DermacinRx Inflammatral Chris, [facility] Acid Control 150, [facility] Acid Reducer, Good Sense Acid Reducer, Leader Acid Control, Leader raNITIdine HCl, [facility] Acid Reducer, Sunmark Acid Reducer, TopCare Heartburn Relief 150, TopCare Heartburn Relief 75, Zantac, Zantac 150, Zantac 300, Zantac 75 There may be other brand names for this medicine. When This Medicine Should Not Be Used: This medicine is not right for everyone. Do not use it if you had an allergic reaction to ranitidine. How to Use This Medicine:  
Capsule, Tablet, Liquid, Fizzy Tablet, Liquid Filled Capsule, Granule · Your doctor will tell you how much medicine to use. Do not use more than directed. · Follow the instructions on the medicine label if you are using this medicine without a prescription. · Measure the oral liquid medicine with a marked measuring spoon, oral syringe, or medicine cup. · The effervescent tablet should not be chewed, swallowed whole, or dissolved on the tongue. The Zantac 25 EFFERdose Tablet should be mixed in 1 teaspoon (or more) of water. Do not drink the liquid until the tablet is completely dissolved.  
· If you are using this medicine to prevent heartburn, take it 30 to 60 minutes before you eat or drink anything that causes you to have heartburn. · Missed dose: Take a dose as soon as you remember. If it is almost time for your next dose, wait until then and take a regular dose. Do not take extra medicine to make up for a missed dose. · Store the medicine in a closed container at room temperature, away from heat, moisture, and direct light. You may store the oral liquid in the refrigerator. Drugs and Foods to Avoid: Ask your doctor or pharmacist before using any other medicine, including over-the-counter medicines, vitamins, and herbal products. · Some medicines can affect how ranitidine works. Tell your doctor if you are using the following: ¨ Atazanavir ¨ Delavirdine ¨ Gefitinib ¨ Glipizide ¨ Ketoconazole ¨ Midazolam 
¨ Triazolam 
¨ Warfarin Warnings While Using This Medicine: · Tell your doctor if you are pregnant or breastfeeding, or if you have kidney disease, liver disease, or a history of acute porphyria. · EFFERdose® tablets contain phenylalanine. If you have phenylketonuria (PKU), talk to your doctor before you use this medicine. · Tell any doctor or dentist who treats you that you are using this medicine. This medicine may affect certain medical test results. · Keep all medicine out of the reach of children. Never share your medicine with anyone. Possible Side Effects While Using This Medicine:  
Call your doctor right away if you notice any of these side effects: · Allergic reaction: Itching or hives, swelling in your face or hands, swelling or tingling in your mouth or throat, chest tightness, trouble breathing · Blistering, peeling, red skin rash · Dark urine or pale stools, nausea, vomiting, loss of appetite, stomach pain, yellow skin or eyes · Fast, slow, or uneven heartbeat · Unusual bleeding, bruising, or weakness If you notice these less serious side effects, talk with your doctor: · Constipation or diarrhea 
· Headache · Mild nausea, vomiting, or stomach pain If you notice other side effects that you think are caused by this medicine, tell your doctor. Call your doctor for medical advice about side effects. You may report side effects to FDA at 0-170-FDA-9198 © 2017 [address] Information is for End User's use only and may not be sold, redistributed or otherwise used for commercial purposes. The above information is an  only. It is not intended as medical advice for individual conditions or treatments. Talk to your doctor, nurse or pharmacist before following any medical regimen to see if it is safe and effective for you.

## 2017-10-09 NOTE — Clinical Note
Patient Class[de-identified] Observation [849] Type of Bed: Telemetry [19] Reason for Observation: chest pain r/o acs Admitting Diagnosis: Chest pain [358197] Admitting Physician: Feliciano Garcia [[id number]] Attending Physician: Feliciano Garcia [[id number]]

## 2017-10-09 NOTE — ED PROVIDER NOTES
HPI Comments: 2:11 PM Delfin Bermudez is a 60 y.o. female with a history of stent placement with revision, HTN, CAD, MI, hyperlipidemia, and IC who presents to ED for the evaluation of intermittent right sided CP described as pressure radiating to his back rated 4/10 that began yesterday. Associated Sx include diaphoresis and fatigue. Pt states that he took NTG and 4 Asprin PTA last night and this morning but her Sx remained the same. Denies abdominal pain, SOB, nausea, vomiting, irregular appetite, LOC, numbness/weakness, blood clot Hx. No other complaints, associated symptoms or modifying factors at this time. PCP: None              The history is provided by the patient. Past Medical History:   Diagnosis Date    CAD (coronary artery disease)     HTN (hypertension)     Hyperlipidemia     IC (interstitial cystitis)     MI (myocardial infarction)        Past Surgical History:   Procedure Laterality Date    CARDIAC SURG PROCEDURE UNLIST      HX APPENDECTOMY      HX CHOLECYSTECTOMY      HX CORONARY STENT PLACEMENT  2007    HX CORONARY STENT PLACEMENT      HX HYSTERECTOMY      HX TUBAL LIGATION Bilateral          Family History:   Problem Relation Age of Onset    Cancer Mother     Heart Disease Father     Hypertension Father     Elevated Lipids Father     Diabetes Neg Hx     Stroke Neg Hx        Social History     Social History    Marital status:      Spouse name: N/A    Number of children: N/A    Years of education: N/A     Occupational History    Not on file. Social History Main Topics    Smoking status: Never Smoker    Smokeless tobacco: Never Used    Alcohol use Yes      Comment: socially    Drug use: No    Sexual activity: No     Other Topics Concern    Not on file     Social History Narrative         ALLERGIES: Adhesive and Macrobid [nitrofurantoin monohyd/m-cryst]    Review of Systems   Constitutional: Positive for diaphoresis and fatigue.    Respiratory: Negative for shortness of breath. Cardiovascular: Positive for chest pain. Gastrointestinal: Negative for abdominal pain, nausea and vomiting. Neurological: Negative for weakness and numbness. All other systems reviewed and are negative. Vitals:    10/09/17 1453 10/09/17 1454 10/09/17 1455 10/09/17 1456   BP:       Pulse: (!) 53 (!) 52 (!) 55 (!) 52   Resp: 11 13 16 16   Temp:       SpO2: 99% 96% 97% 96%   Weight:       Height:                Physical Exam   Constitutional: She is oriented to person, place, and time. She appears well-developed and well-nourished. No distress. HENT:   Head: Normocephalic and atraumatic. Eyes: Conjunctivae are normal. Right eye exhibits no discharge. Left eye exhibits no discharge. Neck: Normal range of motion. Neck supple. Cardiovascular: Normal rate, regular rhythm and normal heart sounds. Trace systolic murmur   Pulmonary/Chest: Effort normal and breath sounds normal. No respiratory distress. She has no wheezes. Abdominal: Soft. Bowel sounds are normal. There is no tenderness. There is no rebound. Musculoskeletal: Normal range of motion. She exhibits no edema, tenderness or deformity. Neurological: She is alert and oriented to person, place, and time. She exhibits normal muscle tone. Skin: Skin is warm and dry. No rash noted. She is not diaphoretic. Psychiatric: She has a normal mood and affect.  Her behavior is normal.        MDM  ED Course       Procedures       Vitals:  Patient Vitals for the past 12 hrs:   Temp Pulse Resp BP SpO2   10/09/17 1456 - (!) 52 16 - 96 %   10/09/17 1455 - (!) 55 16 - 97 %   10/09/17 1454 - (!) 52 13 - 96 %   10/09/17 1453 - (!) 53 11 - 99 %   10/09/17 1452 - (!) 51 15 - 98 %   10/09/17 1451 - (!) 51 18 - 96 %   10/09/17 1450 - (!) 52 22 105/54 97 %   10/09/17 1449 - (!) 51 12 - 98 %   10/09/17 1448 - (!) 51 15 - 97 %   10/09/17 1447 - (!) 51 11 - 97 %   10/09/17 1446 - (!) 53 14 - 97 %   10/09/17 1445 - (!) 53 17 101/54 95 %   10/09/17 1444 - (!) 49 14 - 96 %   10/09/17 1443 - (!) 52 19 - 97 %   10/09/17 1442 - (!) 55 14 - 97 %   10/09/17 1441 - (!) 54 18 - 95 %   10/09/17 1440 - (!) 56 18 104/58 96 %   10/09/17 1439 - (!) 54 14 - 97 %   10/09/17 1438 - (!) 52 14 - 96 %   10/09/17 1437 - (!) 52 10 - 96 %   10/09/17 1436 - (!) 54 17 - 96 %   10/09/17 1435 - (!) 53 13 102/54 96 %   10/09/17 1434 - (!) 53 15 - 95 %   10/09/17 1433 - (!) 53 11 - 95 %   10/09/17 1432 - (!) 54 13 - 96 %   10/09/17 1431 - (!) 57 13 - 92 %   10/09/17 1430 - (!) 53 15 98/57 (!) 89 %   10/09/17 1429 - (!) 56 19 - 92 %   10/09/17 1428 - (!) 56 14 (!) 82/59 94 %   10/09/17 1427 - (!) 56 15 - 94 %   10/09/17 1426 - (!) 55 18 - 95 %   10/09/17 1425 - (!) 52 22 - 96 %   10/09/17 1424 - (!) 53 17 - 96 %   10/09/17 1423 - (!) 51 11 - 97 %   10/09/17 1422 - (!) 53 17 - 95 %   10/09/17 1421 - (!) 53 12 - 97 %   10/09/17 1420 - (!) 52 11 - 95 %   10/09/17 1419 98 °F (36.7 °C) (!) 54 13 115/75 97 %   10/09/17 1418 - (!) 53 13 - 95 %   10/09/17 1417 - (!) 55 17 - 95 %   10/09/17 1416 - (!) 56 16 - 95 %   10/09/17 1414 - (!) 53 14 - 97 %   10/09/17 1413 - (!) 53 13 - 95 %   10/09/17 1412 - (!) 53 14 - 96 %   10/09/17 1411 - (!) 52 12 - 97 %   10/09/17 1410 - (!) 53 10 - 97 %   10/09/17 1409 - (!) 54 13 - 98 %   10/09/17 1408 - (!) 54 14 - 97 %   10/09/17 1407 - (!) 54 14 - 98 %   10/09/17 1406 - (!) 55 16 - 97 %         Medications ordered:   Medications   nitroglycerin (NITROSTAT) tablet 0.4 mg (0.4 mg SubLINGual Given 10/9/17 1432)   sodium chloride 0.9 % bolus infusion 500 mL (500 mL IntraVENous New Bag 10/9/17 1432)   sodium chloride 0.9 % bolus infusion 500 mL (500 mL IntraVENous New Bag 10/9/17 1452)   aspirin chewable tablet 324 mg (324 mg Oral Given 10/9/17 1521)         Lab findings:  Recent Results (from the past 12 hour(s))   EKG, 12 LEAD, INITIAL    Collection Time: 10/09/17  2:07 PM   Result Value Ref Range    Ventricular Rate 56 BPM    Atrial Rate 56 BPM    P-R Interval 204 ms    QRS Duration 84 ms    Q-T Interval 452 ms    QTC Calculation (Bezet) 436 ms    Calculated P Axis 20 degrees    Calculated R Axis 14 degrees    Calculated T Axis 71 degrees    Diagnosis       Sinus bradycardia  Otherwise normal ECG  When compared with ECG of 18-SEP-2017 15:20,  Criteria for Inferior infarct are no longer present     METABOLIC PANEL, BASIC    Collection Time: 10/09/17  2:20 PM   Result Value Ref Range    Sodium 140 136 - 145 mmol/L    Potassium 3.7 3.5 - 5.5 mmol/L    Chloride 106 100 - 108 mmol/L    CO2 24 21 - 32 mmol/L    Anion gap 10 3.0 - 18 mmol/L    Glucose 123 (H) 74 - 99 mg/dL    BUN 14 7.0 - 18 MG/DL    Creatinine 0.91 0.6 - 1.3 MG/DL    BUN/Creatinine ratio 15 12 - 20      GFR est AA >60 >60 ml/min/1.73m2    GFR est non-AA >60 >60 ml/min/1.73m2    Calcium 8.6 8.5 - 10.1 MG/DL   CBC WITH AUTOMATED DIFF    Collection Time: 10/09/17  2:20 PM   Result Value Ref Range    WBC 5.8 4.6 - 13.2 K/uL    RBC 4.15 (L) 4.20 - 5.30 M/uL    HGB 13.0 12.0 - 16.0 g/dL    HCT 38.0 35.0 - 45.0 %    MCV 91.6 74.0 - 97.0 FL    MCH 31.3 24.0 - 34.0 PG    MCHC 34.2 31.0 - 37.0 g/dL    RDW 12.2 11.6 - 14.5 %    PLATELET 958 149 - 893 K/uL    MPV 10.9 9.2 - 11.8 FL    NEUTROPHILS 54 40 - 73 %    LYMPHOCYTES 29 21 - 52 %    MONOCYTES 11 (H) 3 - 10 %    EOSINOPHILS 5 0 - 5 %    BASOPHILS 1 0 - 2 %    ABS. NEUTROPHILS 3.2 1.8 - 8.0 K/UL    ABS. LYMPHOCYTES 1.7 0.9 - 3.6 K/UL    ABS. MONOCYTES 0.6 0.05 - 1.2 K/UL    ABS. EOSINOPHILS 0.3 0.0 - 0.4 K/UL    ABS.  BASOPHILS 0.0 0.0 - 0.06 K/UL    DF AUTOMATED     TROPONIN I    Collection Time: 10/09/17  2:20 PM   Result Value Ref Range    Troponin-I, Qt. <0.02 0.00 - 0.06 NG/ML   EKG, 12 LEAD, SUBSEQUENT    Collection Time: 10/09/17  2:51 PM   Result Value Ref Range    Ventricular Rate 49 BPM    Atrial Rate 49 BPM    P-R Interval 208 ms    QRS Duration 80 ms    Q-T Interval 486 ms    QTC Calculation (Bezet) 439 ms    Calculated P Axis 25 degrees Calculated T Axis 69 degrees    Diagnosis       Marked sinus bradycardia  Anterolateral infarct , age undetermined  Abnormal ECG  When compared with ECG of 09-OCT-2017 14:07,  Anterior infarct is now present  Anterolateral infarct is now present  Nonspecific T wave abnormality, worse in Anterolateral leads         EKG interpretation by ED Physician:  SR at 56bpm, nml axis, nonspec st/twave abnorm, no stemi, no old ekg for comparison  Right sided EKG: SR at 49bpm, no elevation in avR      X-Ray, CT or other radiology findings or impressions:  Xr Chest Port    Result Date: 10/9/2017  Chest single view History: Chest pain for one day. Comparison: Multiple prior studies with the most recent from August 2016 Findings: The lungs are clear. No evidence to suggest pneumothorax of pleural effusion. The cardiomediastinal silhouette is unremarkable. Impression: No radiographic evidence of acute cardiopulmonary process. Progress notes, Consult notes or additional Procedure notes:   3:38 PM Consult:  Discussed care with nurse practitioner for Dr. Isaac Solomon Standard discussion; including history of patients chief complaint, available diagnostic results, and treatment course. She states she will call Dr. Isaac Solomon and have him call patient . She did attempt to get in touch with Dr. Isaac Solomon but states that he is in a seminar. She agrees that she would recommend transfer to [facility] for further ACS risk stratification. 3:52 Consult:  Discussed care with Dr. Nadir Martin, Hospitalist Standard discussion; including history of patients chief complaint, available diagnostic results, and treatment course. Accepts pt for further evaluation. Pt with transient drop in BP related to nitroglycerin. Right sided ekg obtained with no elevation in avr. Pt responsive to fluid bolus. Pt with CP, similar to prior ACS pain. Pt states prior radiation of pain into trapezius and back with prior episode of ACS.  Resolved with ntg here. Do not suspect aortic process, pna, ptx, vte. Feel pt would benefit from further ACS risk stratification. Pt and family agree with plan. Transfer to [facility]    Disposition:  Diagnosis:   1. Chest pain, unspecified type    2. Dyspnea, unspecified type        Disposition:     Follow-up Information     None           Patient's Medications   Start Taking    No medications on file   Continue Taking    ALBUTEROL (PROAIR HFA) 90 MCG/ACTUATION INHALER    Take  by inhalation. AMLODIPINE (NORVASC) 5 MG TABLET    TAKE 1 TAB BY MOUTH DAILY. ASPIRIN 81 MG CHEWABLE TABLET    Take 81 mg by mouth daily. CLOPIDOGREL (PLAVIX) 75 MG TAB    Take 1 Tab by mouth daily. METOPROLOL TARTRATE (LOPRESSOR) 50 MG TABLET    Take 1 Tab by mouth two (2) times a day. NITROGLYCERIN (NITROSTAT) 0.4 MG SL TABLET    by SubLINGual route every five (5) minutes as needed for Chest Pain. OMEGA-3-DHA-EPA-FISH OIL 1,000 MG (120 MG-180 MG) CAP    Take  by mouth. ONDANSETRON (ZOFRAN ODT) 4 MG DISINTEGRATING TABLET    Take 1 Tab by mouth every eight (8) hours as needed for Nausea. PHENAZOPYRIDINE (PYRIDIUM) 100 MG TABLET    Take 100 mg by mouth as needed. SIMVASTATIN (ZOCOR) 40 MG TABLET    Take 1 Tab by mouth nightly. TEMAZEPAM (RESTORIL) 30 MG CAPSULE    Take  by mouth nightly as needed for Sleep. TRAZODONE (DESYREL) 100 MG TABLET    Take 100 mg by mouth as needed. These Medications have changed    No medications on file   Stop Taking    ALPRAZOLAM (XANAX) 0.5 MG TABLET    Take  by mouth. DIAZEPAM (VALIUM) 2 MG TABLET    Take 1 Tab by mouth every six (6) hours as needed for Anxiety (and spasms). Max Daily Amount: 8 mg. FLUTICASONE-SALMETEROL (ADVAIR DISKUS) 250-50 MCG/DOSE DISKUS INHALER    Take 1 Puff by inhalation every twelve (12) hours. GABAPENTIN (NEURONTIN) 100 MG CAPSULE    Take 1 Cap by mouth three (3) times daily. MIRABEGRON ER (MYRBETRIQ) 25 MG ER TABLET    Take 1 Tab by mouth daily. Indications: BLADDER HYPERACTIVITY    MTH/ME BLUE/SOD PHOS/PHEN/HYOS (URIBEL PO)    Take  by mouth. OXYCODONE-ACETAMINOPHEN (PERCOCET) 5-325 MG PER TABLET    Take 1 Tab by mouth every six (6) hours as needed for Pain. Max Daily Amount: 4 Tabs. PENTOSAN POLYSULFATE SODIUM (ELMIRON) 100 MG CAPSULE    Take 1 Cap by mouth Before breakfast, lunch, and dinner. Indications: INTERSTITIAL CYSTITIS    PROMETHAZINE (PHENERGAN) 25 MG TABLET    Take 1 tablet by mouth every six (6) hours as needed. Scribe Attestation:   Mitzi Oliva acting as a scribe for and in the presence of Danelle Shipman DO October 09, 2017 at 2:11 PM     Signed by: Samantha Argueta, October 09, 2017 at 2:11 PM     Provider Attestation:   I personally performed the services described in the documentation, reviewed the documentation, as recorded by the scribe in my presence, and it accurately and completely records my words and actions.      Reviewed and signed by:  Danelle Shipman DO

## 2017-10-09 NOTE — H&P
Hospitalist Admission History and Physical    NAME:  Delfin Bermudez   :   1955   MRN:   [id number]     PCP:  None  Date/Time:  10/9/2017 7:02 PM  Subjective:   CHIEF COMPLAINT:  Chest pain     HISTORY OF PRESENT ILLNESS:     Harini Hall is a 60 y.o. Who comes to the Ed with complaints of chest pain. Patient states she started having chest pain last night which she describes as sharp in middle and left side of the chest and sometimes radiating to her back. She took 4 ASA last night and nitro and went to bed. When she woke up this morning she was feeling fine but later again she started having this pain, this time she ended up coming to the ED for evaluation. During this time she also had one mild episode of sob, which could be from slightly anxiety at the time. No other complaints. In the ED she was noted to have normal labs and CE. Due to high risk factors Cardiology was called who advised to observe the patient for overnight. When I saw the patient here at [facility], she tells me she had her first MI back in  when had a stent placed in her LAD by Dr Devika Garcia. She was doing well until few months ago when she was noted to have some EKG changes and mild chest pain when she underwent another cardiac cath by Dr Amando Apodaca. At that time she was found to have instent thrombosis of her LAD which was reopened. Then she underwent minor cervical neck surgery about 2 weeks ago when she was told she has some ekg changes again, she consulted one of her cardiologist friend who told her she could have had silent MI. Since past 2 weeks she states she has also been feeling lousy and started having chest pain last night. Denies smoking or IVDA. Drinks alcohol socially.      FULL CODE         Past Medical History:   Diagnosis Date    CAD (coronary artery disease)     HTN (hypertension)     Hyperlipidemia     IC (interstitial cystitis)     MI (myocardial infarction)         Past Surgical History:   Procedure Laterality Date    CARDIAC SURG PROCEDURE UNLIST      HX APPENDECTOMY      HX CHOLECYSTECTOMY      HX CORONARY STENT PLACEMENT  2007    HX CORONARY STENT PLACEMENT      HX HYSTERECTOMY      HX TUBAL LIGATION Bilateral        Social History   Substance Use Topics    Smoking status: Never Smoker    Smokeless tobacco: Never Used    Alcohol use Yes      Comment: socially        Family History   Problem Relation Age of Onset    Cancer Mother     Heart Disease Father     Hypertension Father     Elevated Lipids Father     Diabetes Neg Hx     Stroke Neg Hx         Allergies   Allergen Reactions    Adhesive Rash    Macrobid [Nitrofurantoin Monohyd/M-Cryst] Nausea and Vomiting        Prior to Admission Medications   Prescriptions Last Dose Informant Patient Reported? Taking? Omega-3-DHA-EPA-Fish Oil 1,000 mg (120 mg-180 mg) cap Unknown at Unknown time  Yes No   Sig: Take  by mouth. albuterol (PROAIR HFA) 90 mcg/actuation inhaler   Yes Yes   Sig: Take  by inhalation. amLODIPine (NORVASC) 5 mg tablet   No Yes   Sig: TAKE 1 TAB BY MOUTH DAILY. aspirin 81 mg chewable tablet   Yes Yes   Sig: Take 81 mg by mouth daily. clopidogrel (PLAVIX) 75 mg tab   No Yes   Sig: Take 1 Tab by mouth daily. metoprolol tartrate (LOPRESSOR) 50 mg tablet   No Yes   Sig: Take 1 Tab by mouth two (2) times a day. nitroglycerin (NITROSTAT) 0.4 mg SL tablet 10/9/2017 at Unknown time  Yes Yes   Sig: by SubLINGual route every five (5) minutes as needed for Chest Pain. ondansetron (ZOFRAN ODT) 4 mg disintegrating tablet Not Taking at Unknown time  No No   Sig: Take 1 Tab by mouth every eight (8) hours as needed for Nausea. phenazopyridine (PYRIDIUM) 100 mg tablet Not Taking at Unknown time  Yes No   Sig: Take 100 mg by mouth as needed. simvastatin (ZOCOR) 40 mg tablet   No Yes   Sig: Take 1 Tab by mouth nightly.    temazepam (RESTORIL) 30 mg capsule   Yes Yes   Sig: Take  by mouth nightly as needed for Sleep.   traZODone (DESYREL) 100 mg tablet   Yes Yes   Sig: Take 100 mg by mouth as needed. Facility-Administered Medications: None       REVIEW OF SYSTEMS:     [] Unable to obtain  ROS due to  []mental status change  []sedated   []intubated   [x]Total of 12 systems reviewed as follows:  Constitutional:  negative fever, negative chills, negative weight loss  Eyes:   negative visual changes  ENT:   negative sore throat, tongue or lip swelling  Respiratory:  negative cough, negative dyspnea  Cards:   negative for  palpitations, lower extremity edema  GI:   negative for nausea, vomiting, diarrhea, and abdominal pain  Genitourinary: negative for frequency, dysuria  Integument:  negative for rash and pruritus  Hematologic:  negative for easy bruising and gum/nose bleeding  Musculoskel: negative for myalgias,  back pain and muscle weakness  Neurological:  negative for headaches, dizziness, vertigo  Behavl/Psych:  negative for feelings of anxiety, depression     Pertinent Positives include :per hpi     Objective:   VITALS:    Visit Vitals    /78    Pulse (!) 59    Temp 98 °F (36.7 °C)    Resp 16    Ht 5' 6\" (1.676 m)    Wt 81.6 kg (180 lb)    SpO2 98%    BMI 29.05 kg/m2     Temp (24hrs), Av °F (36.7 °C), Min:98 °F (36.7 °C), Max:98 °F (36.7 °C)      PHYSICAL EXAM:   General:    Alert, cooperative, no distress, appears stated age. Head:   Normocephalic, without obvious abnormality, atraumatic. Eyes:   Conjunctivae clear, anicteric sclerae. Pupils are equal  Nose:  Nares normal. No drainage or sinus tenderness. Throat:    Lips, mucosa, and tongue normal.  No Thrush  Neck:  Supple, symmetrical,  no adenopathy, thyroid: non tender    no carotid bruit and no JVD. Back:    Symmetric,  No CVA tenderness. Lungs:   Clear to auscultation bilaterally. No Wheezing or Rhonchi. No rales. Chest wall:  No tenderness or deformity. No Accessory muscle use.   Heart:   Regular rate and rhythm,  no murmur, rub or gallop. Abdomen:   Soft, non-tender. Not distended. Bowel sounds normal. No masses  Extremities: Extremities normal, atraumatic, No cyanosis. No edema. No clubbing  Skin:     Texture, turgor normal. No rashes or lesions. Not Jaundiced  Lymph nodes: Cervical, supraclavicular normal.  Psych:  Good insight. Not depressed. Not anxious or agitated. Neurologic: EOMs intact. No facial asymmetry. No aphasia or slurred speech. Normal   strength, Alert and oriented X 3. LAB DATA REVIEWED:    No results found for: [address] [place] [address]      10/09/17   1420   NA  140   K  3.7   CL  106   CO2  24   BUN  14   CREA  0.91   GLU  123*   CA  8.6   WBC  5.8   HGB  13.0   HCT  38.0   PLT  207         IMAGING RESULTS:   []  I have personally reviewed the actual   []CXR  []CT scan    CXR:  CT :    Assessment/Plan:      Principal Problem:    Chest pain (10/9/2017)    Active Problems:    Asthma (3/21/2014)      HTN (hypertension) (3/21/2014)      CAD (coronary artery disease) (3/21/2014)      Hyperlipidemia (3/21/2014)      Presence of stent in LAD coronary artery (8/17/2016)       ___________________________________________________  PLAN:    Risk of deterioration:  []Low    []Moderate  [x]High    1. Atypical chest Pain  2. CAD s/p Stent in LAD   3. HTN  4. HLD  5. Asthma   FULL CODE     -admit to tele for obs   -trend Ce at this time, cardiology consulted in ED by the physician  -will defer further work up per cardiology.  She is well known to Dr Blaire Murguia   -supportive care, pain control, nitro if needed   -cont other home medications at this time          Prophylaxis:  []Lovenox  []Coumadin  [x]Hep SQ  []SCDs  []H2B/PPI    Disposition:  [x]Home w/ Family   []HH PT,OT,RN   []SNF/LTC   []SAH/Rehab    Discussed Code Status:    [x]Full Code      []DNR     ___________________________________________________    Care Plan discussed with:    [x]Patient   []Family    []ED Care Manager  []ED Doc   []Specialist :    Total Time Coordinating Admission:   45   minutes    []Total Critical Care Time:     ___________________________________________________  Admitting Physician: Jean Stahl MD

## 2017-10-09 NOTE — IP AVS SNAPSHOT
[address] [address] [place] Patient: Sunita Moreno MRN: [id number] Current Discharge Medication List  
  
START taking these medications Dose & Instructions Dispensing Information Comments Morning Noon Evening Bedtime  
 raNITIdine 150 mg tablet Commonly known as:  ZANTAC Your last dose was: Your next dose is:    
   
   
 Dose:  150 mg Take 1 Tab by mouth two (2) times a day for 5 days. Quantity:  10 Tab Refills:  0 CONTINUE these medications which have NOT CHANGED Dose & Instructions Dispensing Information Comments Morning Noon Evening Bedtime  
 amLODIPine 5 mg tablet Commonly known as:  Paramjit Greco Your last dose was: Your next dose is: TAKE 1 TAB BY MOUTH DAILY. Quantity:  30 Tab Refills:  3  
     
   
   
   
  
 aspirin 81 mg chewable tablet Your last dose was: Your next dose is:    
   
   
 Dose:  81 mg Take 81 mg by mouth daily. Refills:  0  
     
   
   
   
  
 clopidogrel 75 mg Tab Commonly known as:  PLAVIX Your last dose was: Your next dose is:    
   
   
 Dose:  75 mg Take 1 Tab by mouth daily. Quantity:  30 Tab Refills:  6  
     
   
   
   
  
 metoprolol tartrate 50 mg tablet Commonly known as:  LOPRESSOR Your last dose was: Your next dose is:    
   
   
 Dose:  50 mg Take 1 Tab by mouth two (2) times a day. Quantity:  60 Tab Refills:  6 NITROSTAT 0.4 mg SL tablet Generic drug:  nitroglycerin Your last dose was: Your next dose is:    
   
   
 by SubLINGual route every five (5) minutes as needed for Chest Pain. Refills:  0 Omega-3-DHA-EPA-Fish Oil 1,000 mg (120 mg-180 mg) Cap Your last dose was: Your next dose is: Take  by mouth. Refills:  0 ondansetron 4 mg disintegrating tablet Commonly known as:  ZOFRAN ODT Your last dose was: Your next dose is:    
   
   
 Dose:  4 mg Take 1 Tab by mouth every eight (8) hours as needed for Nausea. Quantity:  14 Tab Refills:  0 PROAIR HFA 90 mcg/actuation inhaler Generic drug:  albuterol Your last dose was: Your next dose is: Take  by inhalation. Refills:  0 PYRIDIUM 100 mg tablet Generic drug:  phenazopyridine Your last dose was: Your next dose is:    
   
   
 Dose:  100 mg Take 100 mg by mouth as needed. Refills:  0  
     
   
   
   
  
 simvastatin 40 mg tablet Commonly known as:  ZOCOR Your last dose was: Your next dose is:    
   
   
 Dose:  40 mg Take 1 Tab by mouth nightly. Quantity:  30 Tab Refills:  6  
     
   
   
   
  
 traZODone 100 mg tablet Commonly known as:  Tabares [place] Your last dose was: Your next dose is:    
   
   
 Dose:  100 mg Take 100 mg by mouth as needed. Refills:  0 STOP taking these medications RESTORIL 30 mg capsule Generic drug:  temazepam  
   
  
  
  
Where to Get Your Medications Information on where to get these meds will be given to you by the nurse or doctor. ! Ask your nurse or doctor about these medications  
  raNITIdine 150 mg tablet

## 2017-10-09 NOTE — ED TRIAGE NOTES
Pt reports chest pain, onset yesterday. Pt took NTG yesterday, enabling relief to sleep. Pt presents in NAD. Pt has hx of MI,  stent placement with revision.

## 2017-10-10 VITALS
BODY MASS INDEX: 31.77 KG/M2 | RESPIRATION RATE: 18 BRPM | OXYGEN SATURATION: 97 % | WEIGHT: 197.7 LBS | SYSTOLIC BLOOD PRESSURE: 137 MMHG | TEMPERATURE: 98.6 F | HEIGHT: 66 IN | HEART RATE: 60 BPM | DIASTOLIC BLOOD PRESSURE: 75 MMHG

## 2017-10-10 PROBLEM — R07.89 ATYPICAL CHEST PAIN: Status: ACTIVE | Noted: 2017-10-09

## 2017-10-10 LAB
ALBUMIN SERPL-MCNC: 3.3 G/DL (ref 3.4–5)
ALBUMIN/GLOB SERPL: 1.1 {RATIO} (ref 0.8–1.7)
ALP SERPL-CCNC: 64 U/L (ref 45–117)
ALT SERPL-CCNC: 16 U/L (ref 13–56)
ANION GAP SERPL CALC-SCNC: 10 MMOL/L (ref 3–18)
APTT PPP: 32 SEC (ref 23–36.4)
AST SERPL-CCNC: 24 U/L (ref 15–37)
ATTENDING PHYSICIAN, CST07: NORMAL
BASOPHILS # BLD: 0 K/UL (ref 0–0.1)
BASOPHILS NFR BLD: 1 % (ref 0–2)
BILIRUB SERPL-MCNC: 0.4 MG/DL (ref 0.2–1)
BUN SERPL-MCNC: 10 MG/DL (ref 7–18)
BUN/CREAT SERPL: 11 (ref 12–20)
CALCIUM SERPL-MCNC: 8 MG/DL (ref 8.5–10.1)
CHLORIDE SERPL-SCNC: 110 MMOL/L (ref 100–108)
CHOLEST SERPL-MCNC: 257 MG/DL
CK MB CFR SERPL CALC: NORMAL % (ref 0–4)
CK MB CFR SERPL CALC: NORMAL % (ref 0–4)
CK MB SERPL-MCNC: <1 NG/ML (ref 5–25)
CK MB SERPL-MCNC: <1 NG/ML (ref 5–25)
CK SERPL-CCNC: 74 U/L (ref 26–192)
CK SERPL-CCNC: 91 U/L (ref 26–192)
CO2 SERPL-SCNC: 24 MMOL/L (ref 21–32)
CREAT SERPL-MCNC: 0.87 MG/DL (ref 0.6–1.3)
DIAGNOSIS, 93000: NORMAL
DIFFERENTIAL METHOD BLD: ABNORMAL
DUKE TM SCORE RESULT, CST14: NORMAL
DUKE TREADMILL SCORE, CST13: NORMAL
ECG INTERP BEFORE EX, CST11: NORMAL
ECG INTERP DURING EX, CST12: NORMAL
EOSINOPHIL # BLD: 0.2 K/UL (ref 0–0.4)
EOSINOPHIL NFR BLD: 5 % (ref 0–5)
ERYTHROCYTE [DISTWIDTH] IN BLOOD BY AUTOMATED COUNT: 12.5 % (ref 11.6–14.5)
FUNCTIONAL CAPACITY, CST17: NORMAL
GLOBULIN SER CALC-MCNC: 2.9 G/DL (ref 2–4)
GLUCOSE SERPL-MCNC: 99 MG/DL (ref 74–99)
HCT VFR BLD AUTO: 36.1 % (ref 35–45)
HDLC SERPL-MCNC: 52 MG/DL (ref 40–60)
HDLC SERPL: 4.9 {RATIO} (ref 0–5)
HGB BLD-MCNC: 12.1 G/DL (ref 12–16)
INR PPP: 1.1 (ref 0.8–1.2)
KNOWN CARDIAC CONDITION, CST08: NORMAL
LDLC SERPL CALC-MCNC: 148.2 MG/DL (ref 0–100)
LIPID PROFILE,FLP: ABNORMAL
LYMPHOCYTES # BLD: 2.1 K/UL (ref 0.9–3.6)
LYMPHOCYTES NFR BLD: 41 % (ref 21–52)
MAX. DIASTOLIC BP, CST04: 88 MMHG
MAX. HEART RATE, CST05: 123 BPM
MAX. SYSTOLIC BP, CST03: 182 MMHG
MCH RBC QN AUTO: 31.1 PG (ref 24–34)
MCHC RBC AUTO-ENTMCNC: 33.5 G/DL (ref 31–37)
MCV RBC AUTO: 92.8 FL (ref 74–97)
MONOCYTES # BLD: 0.4 K/UL (ref 0.05–1.2)
MONOCYTES NFR BLD: 8 % (ref 3–10)
NEUTS SEG # BLD: 2.4 K/UL (ref 1.8–8)
NEUTS SEG NFR BLD: 45 % (ref 40–73)
OVERALL BP RESPONSE TO EXERCISE, CST16: NORMAL
OVERALL HR RESPONSE TO EXERCISE, CST15: NORMAL
PEAK EX METS, CST10: 7.4 METS
PLATELET # BLD AUTO: 197 K/UL (ref 135–420)
PMV BLD AUTO: 11.3 FL (ref 9.2–11.8)
POTASSIUM SERPL-SCNC: 3.5 MMOL/L (ref 3.5–5.5)
PROT SERPL-MCNC: 6.2 G/DL (ref 6.4–8.2)
PROTHROMBIN TIME: 13.5 SEC (ref 11.5–15.2)
PROTOCOL NAME, CST01: NORMAL
RBC # BLD AUTO: 3.89 M/UL (ref 4.2–5.3)
SODIUM SERPL-SCNC: 144 MMOL/L (ref 136–145)
TEST INDICATION, CST09: NORMAL
TRIGL SERPL-MCNC: 284 MG/DL (ref ?–150)
TROPONIN I SERPL-MCNC: <0.02 NG/ML (ref 0–0.04)
TROPONIN I SERPL-MCNC: <0.02 NG/ML (ref 0–0.04)
VLDLC SERPL CALC-MCNC: 56.8 MG/DL
WBC # BLD AUTO: 5.1 K/UL (ref 4.6–13.2)

## 2017-10-10 PROCEDURE — 93017 CV STRESS TEST TRACING ONLY: CPT | Performed by: NURSE PRACTITIONER

## 2017-10-10 PROCEDURE — 74011250636 HC RX REV CODE- 250/636: Performed by: INTERNAL MEDICINE

## 2017-10-10 PROCEDURE — 36415 COLL VENOUS BLD VENIPUNCTURE: CPT | Performed by: INTERNAL MEDICINE

## 2017-10-10 PROCEDURE — 80061 LIPID PANEL: CPT | Performed by: NURSE PRACTITIONER

## 2017-10-10 PROCEDURE — 96361 HYDRATE IV INFUSION ADD-ON: CPT

## 2017-10-10 PROCEDURE — 99218 HC RM OBSERVATION: CPT

## 2017-10-10 PROCEDURE — 85025 COMPLETE CBC W/AUTO DIFF WBC: CPT | Performed by: INTERNAL MEDICINE

## 2017-10-10 PROCEDURE — 85730 THROMBOPLASTIN TIME PARTIAL: CPT | Performed by: INTERNAL MEDICINE

## 2017-10-10 PROCEDURE — 78452 HT MUSCLE IMAGE SPECT MULT: CPT | Performed by: NURSE PRACTITIONER

## 2017-10-10 PROCEDURE — 85610 PROTHROMBIN TIME: CPT | Performed by: INTERNAL MEDICINE

## 2017-10-10 PROCEDURE — 82550 ASSAY OF CK (CPK): CPT | Performed by: INTERNAL MEDICINE

## 2017-10-10 PROCEDURE — 96374 THER/PROPH/DIAG INJ IV PUSH: CPT

## 2017-10-10 PROCEDURE — 96372 THER/PROPH/DIAG INJ SC/IM: CPT

## 2017-10-10 PROCEDURE — 74011250637 HC RX REV CODE- 250/637: Performed by: INTERNAL MEDICINE

## 2017-10-10 PROCEDURE — 80053 COMPREHEN METABOLIC PANEL: CPT | Performed by: INTERNAL MEDICINE

## 2017-10-10 PROCEDURE — A9500 TC99M SESTAMIBI: HCPCS

## 2017-10-10 RX ORDER — RANITIDINE 150 MG/1
150 TABLET, FILM COATED ORAL 2 TIMES DAILY
Qty: 10 TAB | Refills: 0 | Status: SHIPPED
Start: 2017-10-10 | End: 2017-10-15

## 2017-10-10 RX ADMIN — CLOPIDOGREL BISULFATE 75 MG: 75 TABLET ORAL at 11:04

## 2017-10-10 RX ADMIN — AMLODIPINE BESYLATE 5 MG: 5 TABLET ORAL at 11:04

## 2017-10-10 RX ADMIN — Medication 1 MG: at 11:04

## 2017-10-10 RX ADMIN — ACETAMINOPHEN 650 MG: 325 TABLET ORAL at 12:35

## 2017-10-10 RX ADMIN — Medication 1 CAPSULE: at 11:04

## 2017-10-10 RX ADMIN — HEPARIN SODIUM 5000 UNITS: 5000 INJECTION INTRAVENOUS; SUBCUTANEOUS at 12:36

## 2017-10-10 RX ADMIN — HEPARIN SODIUM 5000 UNITS: 5000 INJECTION INTRAVENOUS; SUBCUTANEOUS at 06:00

## 2017-10-10 RX ADMIN — METOPROLOL TARTRATE 50 MG: 50 TABLET ORAL at 11:04

## 2017-10-10 RX ADMIN — REGADENOSON 0.4 MG: 0.08 INJECTION, SOLUTION INTRAVENOUS at 10:30

## 2017-10-10 RX ADMIN — ASPIRIN 81 MG 81 MG: 81 TABLET ORAL at 11:04

## 2017-10-10 NOTE — ROUTINE PROCESS
Bedside and Verbal shift change report given to Natty Hardy RN (oncoming nurse) by Richy Ferrari (offgoing nurse). Report included the following information SBAR, Kardex, MAR and Recent Results.     SITUATION:    Code Status: Full Code   Reason for Admission: Chest pain   Chest pain   Chest pain    [facility][1] day: 0   Problem List:       Hospital Problems  Date Reviewed: 10/9/2017          Codes Class Noted POA    * (Principal)Chest pain ICD-10-CM: R07.9  ICD-9-CM: 786.50  10/9/2017 Unknown        Presence of stent in LAD coronary artery ICD-10-CM: Z95.5  ICD-9-CM: V45.82  8/17/2016 Yes        Asthma (Chronic) ICD-10-CM: J45.909  ICD-9-CM: 493.90  3/21/2014 Yes        HTN (hypertension) (Chronic) ICD-10-CM: I10  ICD-9-CM: 401.9  3/21/2014 Yes        CAD (coronary artery disease) ICD-10-CM: I25.10  ICD-9-CM: 414.00  3/21/2014 Yes        Hyperlipidemia (Chronic) ICD-10-CM: E78.5  ICD-9-CM: 272.4  3/21/2014 Yes              BACKGROUND:    Past Medical History:   Past Medical History:   Diagnosis Date    CAD (coronary artery disease)     HTN (hypertension)     Hyperlipidemia     IC (interstitial cystitis)     MI (myocardial infarction)          Patient taking anticoagulants yes     ASSESSMENT:    Changes in Assessment Throughout Shift: none     Patient has Central Line: no Reasons if yes: n/a   Patient has Loyd Cath: no Reasons if yes: n/a      Last Vitals:     Vitals:    10/09/17 1741 10/09/17 1955 10/09/17 2147 10/10/17 0058   BP:  145/76 144/85 129/68   Pulse: (!) 59 (!) 58 (!) 57 (!) 58   Resp: 16 16  18   Temp:  98 °F (36.7 °C)  98.3 °F (36.8 °C)   SpO2: 98% 94%  94%   Weight:       Height:            IV and DRAINS (will only show if present)         WOUND (if present)   Wound Type:  none   Dressing present Dressing Present : No   Wound Concerns/Notes:  none     PAIN    Pain Assessment    Pain Intensity 1: 0 (10/10/17 0058)    Pain Location 1: Chest         Patient Stated Pain Goal: 0  o Interventions for Pain:  none  o Intervention effective: n/a  o Time of last intervention: n/a   o Reassessment Completed: yes      Last 3 Weights:  Last 3 Recorded Weights in this Encounter    10/09/17 1412   Weight: 81.6 kg (180 lb)     Weight change:      INTAKE/OUPUT    Current Shift:      Last three shifts:       LAB RESULTS     Recent Labs      10/09/17   1420   WBC  5.8   HGB  13.0   HCT  38.0   PLT  207        Recent Labs      10/09/17   1420   NA  140   K  3.7   GLU  123*   BUN  14   CREA  0.91   CA  8.6       RECOMMENDATIONS AND DISCHARGE PLANNING     1. Pending tests/procedures/ Plan of Care or Other Needs: Stress Test today     2. Discharge plan for patient and Needs/Barriers: observation    3. Estimated Discharge Date: pending Posted on Whiteboard in Patients Room: yes      4. The patient's care plan was reviewed with the oncoming nurse. \"HEALS\" SAFETY CHECK      Fall Risk    Total Score: 1    Safety Measures: Safety Measures: Bed in low position, Call light within reach, Bed/Chair-Wheels locked    A safety check occurred in the patient's room between off going nurse and oncoming nurse listed above. The safety check included the below items  Area Items   H  High Alert Medications - Verify all high alert medication drips (heparin, PCA, etc.)   E  Equipment - Suction is set up for ALL patients (with yanker)  - Red plugs utilized for all equipment (IV pumps, etc.)  - WOWs wiped down at end of shift.  - Room stocked with oxygen, suction, and other unit-specific supplies   A  Alarms - Bed alarm is set for fall risk patients  - Ensure chair alarm is in place and activated if patient is up in a chair   L  Lines - Check IV for any infiltration  - Loyd bag is empty if patient has a Loyd   - Tubing and IV bags are labeled   S  Safety   - Room is clean, patient is clean, and equipment is clean. - Hallways are clear from equipment besides carts.    - Fall bracelet on for fall risk patients  - Ensure room is clear and free of clutter  - Suction is set up for ALL patients (with nato)  - Hallways are clear from equipment besides carts.    - Isolation precautions followed, supplies available outside room, sign posted     Mari Hatfield

## 2017-10-10 NOTE — PROGRESS NOTES
SUBJECTIVE:    No more chest pain. Feeling better. Had headaches but improving. No nausea or vomiting. No Cough or SOB. OBJECTIVE:    /75 (BP 1 Location: Left arm, BP Patient Position: At rest)  Pulse 60  Temp 98.6 °F (37 °C)  Resp 18  Ht 5' 6\" (1.676 m)  Wt 89.7 kg (197 lb 11.2 oz)  SpO2 97%  BMI 31.91 kg/m2     CVS: RRR  RS: CTA bilaterally, no wheezes  GI: NT, BS +  Extremities: no pedal edema  General: NAD, Awake    ASSESSMENT:    1. Atypical chest pain-  likely nonanginal.   2. GERD  3. Hx of CAD with s/p PTCA 8/16 to instent stenosis  4. Hypertension  5.  Hyperlipidemia    PLAN:    Negative stress test  Cardiology input noted  Zantac x 5 days  Discharge patient home today    CMP:   Lab Results   Component Value Date/Time     10/10/2017 02:10 AM    K 3.5 10/10/2017 02:10 AM     (H) 10/10/2017 02:10 AM    CO2 24 10/10/2017 02:10 AM    AGAP 10 10/10/2017 02:10 AM    GLU 99 10/10/2017 02:10 AM    BUN 10 10/10/2017 02:10 AM    CREA 0.87 10/10/2017 02:10 AM    GFRAA >60 10/10/2017 02:10 AM    GFRNA >60 10/10/2017 02:10 AM    CA 8.0 (L) 10/10/2017 02:10 AM    ALB 3.3 (L) 10/10/2017 02:10 AM    TP 6.2 (L) 10/10/2017 02:10 AM    GLOB 2.9 10/10/2017 02:10 AM    AGRAT 1.1 10/10/2017 02:10 AM    SGOT 24 10/10/2017 02:10 AM    ALT 16 10/10/2017 02:10 AM     CBC:   Lab Results   Component Value Date/Time    WBC 5.1 10/10/2017 02:10 AM    HGB 12.1 10/10/2017 02:10 AM    HCT 36.1 10/10/2017 02:10 AM     10/10/2017 02:10 AM     All Cardiac Markers in the last 24 hours:   Lab Results   Component Value Date/Time    CPK 91 10/10/2017 11:30 AM    CPK 74 10/10/2017 02:10 AM    CPK 79 10/09/2017 08:40 PM    CKMB <1.0 10/10/2017 11:30 AM    CKMB <1.0 10/10/2017 02:10 AM    CKMB <1.0 10/09/2017 08:40 PM    CKND1  10/10/2017 11:30 AM     CALCULATION NOT PERFORMED WHEN RESULT IS BELOW LINEAR LIMIT    CKND1  10/10/2017 02:10 AM     CALCULATION NOT PERFORMED WHEN RESULT IS BELOW LINEAR LIMIT CKND1  10/09/2017 08:40 PM     CALCULATION NOT PERFORMED WHEN RESULT IS BELOW LINEAR LIMIT    Creed Clas <0.02 10/10/2017 11:30 AM    TROIQ <0.02 10/10/2017 02:10 AM    TROIQ <0.02 10/09/2017 08:40 PM     Current Discharge Medication List      START taking these medications    Details   raNITIdine (ZANTAC) 150 mg tablet Take 1 Tab by mouth two (2) times a day for 5 days. Qty: 10 Tab, Refills: 0         CONTINUE these medications which have NOT CHANGED    Details   simvastatin (ZOCOR) 40 mg tablet Take 1 Tab by mouth nightly. Qty: 30 Tab, Refills: 6      amLODIPine (NORVASC) 5 mg tablet TAKE 1 TAB BY MOUTH DAILY. Qty: 30 Tab, Refills: 3      clopidogrel (PLAVIX) 75 mg tab Take 1 Tab by mouth daily. Qty: 30 Tab, Refills: 6      metoprolol tartrate (LOPRESSOR) 50 mg tablet Take 1 Tab by mouth two (2) times a day. Qty: 60 Tab, Refills: 6      traZODone (DESYREL) 100 mg tablet Take 100 mg by mouth as needed. nitroglycerin (NITROSTAT) 0.4 mg SL tablet by SubLINGual route every five (5) minutes as needed for Chest Pain. aspirin 81 mg chewable tablet Take 81 mg by mouth daily. albuterol (PROAIR HFA) 90 mcg/actuation inhaler Take  by inhalation. Omega-3-DHA-EPA-Fish Oil 1,000 mg (120 mg-180 mg) cap Take  by mouth. ondansetron (ZOFRAN ODT) 4 mg disintegrating tablet Take 1 Tab by mouth every eight (8) hours as needed for Nausea. Qty: 14 Tab, Refills: 0      phenazopyridine (PYRIDIUM) 100 mg tablet Take 100 mg by mouth as needed.     Associated Diagnoses: IC (interstitial cystitis)         STOP taking these medications       temazepam (RESTORIL) 30 mg capsule Comments:   Reason for Stopping:

## 2017-10-10 NOTE — PROCEDURES
[address] STRESS    Name:  Raven Khan  MR#:  [id number]  :  1955  Account #:  [de-identified]  Date of Adm:  10/09/2017  Date of Service:      EKG PART OF LEXISCAN SESTAMIBI GATED SCAN    DIAGNOSES:  1. Chest pain. 2. Old myocardial infarction. 3. Old percutaneous coronary intervention. BASELINE DATA: Heart rate is 57, blood pressure 140/80. EKG  shows sinus bradycardia, normal axis, nonspecific T-wave flattening in  lead aVL and V3. PROCEDURE NOTE:  1. Lexiscan 0.4 mg given during leg exercises with slow treadmill  walking after initial Black protocol. Ross Ervin was used as the patient  was unable to achieve 85% of predicted maximal heart rate. 2. Heart rate on treadmill is 122, which is 76% of predicted maximal.  Max BP on treadmill was 182/88. Heart rate before Ross Ervin was 92  and gradually reduced to 85. Blood pressure reduced to 148/84 post  Lexiscan. EKG during the treadmill did not show any acute ST-T  changes up to a heart rate of 132 and post Lexiscan biphasic T-waves  are seen in inferior leads and lateral leads. CONCLUSION:  1. No ischemic ST-T changes up to a heart rate of 132 per  minute and nondiagnostic T-wave changes after Lexiscan infusion. 2. Appropriate blood pressure response and somewhat blunted heart  rate response to exercise, which could be due to beta blockers. 3. No ischemic symptoms or arrhythmias noted. 4. Perfusion image report to follow. PROCEDURE NOTE: Sestamibi 10.08 mCi given for resting images  and 33 mCi for stress images after Lexiscan infusion intravenously. Images were obtained in multiple planes, reconstructed, and gated in  the usual fashion. Following are the findings. 1. Stress images revealed normal sestamibi distribution in all the LV  segments in short axis, vertical, and horizontal long axis views. 2. Resting images have mildly reduced uptake in apex.   3. Gated images reveal normal wall motion and ejection fraction is  calculated at 90%. CONCLUSION:  1. Normal perfusion scan. 2. Normal wall motion and ejection fraction.     Thank you for the referral.        MD SHAMA Diaz / Ange Dominguez  D:  10/10/2017   14:30  T:  10/10/2017   14:59  Job #:  [id number]

## 2017-10-10 NOTE — ROUTINE PROCESS
IDR/SLIDR Summary          Patient: Dangelo Waldron MRN: [id number]    Age: 60 y.o. YOB: 1955 Room/Bed: 204/01   Admit Diagnosis: Chest pain  Chest pain  Chest pain  Principal Diagnosis: Chest pain   Goals: observation, safety  Readmission: NO  Quality Measure: AMI  VTE Prophylaxis: Chemical  Influenza Vaccine screening completed? YES  Pneumococcal Vaccine screening completed? YES  Mobility needs: No   Nutrition plan:No  Consults:Case Management    Financial concerns:No  Escalated to CM? NO  RRAT Score: 0   Interventions:H2H  Testing due for pt today? YES  LOS: 0 days Expected length of stay 3 days  Discharge plan: home   PCP: None  Transportation needs: No    Days before discharge:two or more days before discharge   Discharge disposition: Home    Signed:      Richy Ferrari  10/10/2017  3:49 AM

## 2017-10-10 NOTE — DISCHARGE SUMMARY
PATIENT DISCHARGE INSTRUCTIONS      PATIENT DISCHARGE INSTRUCTIONS    Delfin Bermudez / [id number] : 1955    Admitted 10/9/2017 Discharged: 10/10/2017     Dictated # [id number]    · It is important that you take the medication exactly as they are prescribed. · Keep your medication in the bottles provided by the pharmacist and keep a list of the medication names, dosages, and times to be taken in your wallet. · Do not take other medications without consulting your doctor. What to do at Home    Recommended Diet: Cardiac Diet    Recommended Activity: Activity as tolerated    Current Discharge Medication List      START taking these medications    Details   raNITIdine (ZANTAC) 150 mg tablet Take 1 Tab by mouth two (2) times a day for 5 days. Qty: 10 Tab, Refills: 0         CONTINUE these medications which have NOT CHANGED    Details   simvastatin (ZOCOR) 40 mg tablet Take 1 Tab by mouth nightly. Qty: 30 Tab, Refills: 6      amLODIPine (NORVASC) 5 mg tablet TAKE 1 TAB BY MOUTH DAILY. Qty: 30 Tab, Refills: 3      clopidogrel (PLAVIX) 75 mg tab Take 1 Tab by mouth daily. Qty: 30 Tab, Refills: 6      metoprolol tartrate (LOPRESSOR) 50 mg tablet Take 1 Tab by mouth two (2) times a day. Qty: 60 Tab, Refills: 6      traZODone (DESYREL) 100 mg tablet Take 100 mg by mouth as needed. nitroglycerin (NITROSTAT) 0.4 mg SL tablet by SubLINGual route every five (5) minutes as needed for Chest Pain. aspirin 81 mg chewable tablet Take 81 mg by mouth daily. albuterol (PROAIR HFA) 90 mcg/actuation inhaler Take  by inhalation. Omega-3-DHA-EPA-Fish Oil 1,000 mg (120 mg-180 mg) cap Take  by mouth. ondansetron (ZOFRAN ODT) 4 mg disintegrating tablet Take 1 Tab by mouth every eight (8) hours as needed for Nausea. Qty: 14 Tab, Refills: 0      phenazopyridine (PYRIDIUM) 100 mg tablet Take 100 mg by mouth as needed.     Associated Diagnoses: IC (interstitial cystitis)         STOP taking these medications temazepam (RESTORIL) 30 mg capsule Comments:   Reason for Stopping:                 Signed By: Dru Kinney MD     October 10, 2017

## 2017-10-10 NOTE — CONSULTS
[facility] - Consult Note    Date of  Admission: 10/9/2017  2:05 PM     Primary Care Physician:  None     Plan:     1. Atypical chest pain-  likely nonanginal. Chest pain is right sided, sharp and sometimes radiated to her back. Patient's serial troponin are negative. Her EKG showed NSR w/o acute ST-T changes. No SOB no CHF symptoms. Patient with known history of CAD and recent PCI 08/16 with instent stenosis. Will proceed with NUC stress test today to evaluate progression of CAD. 2. Hx of CAD with s/p PTCA 8/16 to instent stenosis-- lesion reduced to 10% with NERIS 3 flow. Continue with asa, bb  and Plavix   3. Hx LV dysfunction- Echo in 2016 showed preserved EF% and diastolic dysfunction. 4. Hypertension- stable continue with Norvasc and bb.   5. Hyperlipidemia- on Pravachol follow lipid panel              Assessment:     Hospital Problems  Date Reviewed: 10/9/2017          Codes Class Noted POA    * (Principal)Chest pain ICD-10-CM: R07.9  ICD-9-CM: 786.50  10/9/2017 Unknown        Presence of stent in LAD coronary artery ICD-10-CM: Z95.5  ICD-9-CM: V45.82  8/17/2016 Yes        Asthma (Chronic) ICD-10-CM: J45.909  ICD-9-CM: 493.90  3/21/2014 Yes        HTN (hypertension) (Chronic) ICD-10-CM: I10  ICD-9-CM: 401.9  3/21/2014 Yes        CAD (coronary artery disease) ICD-10-CM: I25.10  ICD-9-CM: 414.00  3/21/2014 Yes        Hyperlipidemia (Chronic) ICD-10-CM: E78.5  ICD-9-CM: 272.4  3/21/2014 Yes                   History of Present Illness: This is a 60 y.o. female admitted for Chest pain. Patient known to our practice followed by Dr. Lacie Mckeon. Patient presented to ED [facility] 1 complianing of CP. Patient states she started having chest pain onset two nights ago. night She  describes as sharp in middle and right side of the chest and sometimes radiating to her back. She took 4 ASA  and nitro and went to bed.  When she woke up  she was feeling fine but later again she started having this pain, this time she ended up coming to the ED for evaluation. During this time she also had one mild episode of sob, which could be from slightly anxiety at the time. Patient is resting in bed, comfortable denies any chest pain or chest pressure. No SOB no palpitations. No fever no chills. . No recent CVA. Past Medical History:     Past Medical History:   Diagnosis Date    CAD (coronary artery disease)     HTN (hypertension)     Hyperlipidemia     IC (interstitial cystitis)     MI (myocardial infarction)          Social History:     Social History     Social History    Marital status:      Spouse name: N/A    Number of children: N/A    Years of education: N/A     Social History Main Topics    Smoking status: Never Smoker    Smokeless tobacco: Never Used    Alcohol use Yes      Comment: socially    Drug use: No    Sexual activity: No     Other Topics Concern    None     Social History Narrative        Family History:     Family History   Problem Relation Age of Onset    Cancer Mother     Heart Disease Father     Hypertension Father     Elevated Lipids Father     Diabetes Neg Hx     Stroke Neg Hx         Medications:      Allergies   Allergen Reactions    Latex, Natural Rubber Rash    Adhesive Rash    Macrobid [Nitrofurantoin Monohyd/M-Cryst] Nausea and Vomiting        Current Facility-Administered Medications   Medication Dose Route Frequency    aspirin chewable tablet 81 mg  81 mg Oral DAILY    nitroglycerin (NITROSTAT) tablet 0.4 mg  0.4 mg SubLINGual PRN    traZODone (DESYREL) tablet 50 mg  50 mg Oral QHS PRN    ondansetron (ZOFRAN ODT) tablet 4 mg  4 mg Oral Q8H PRN    metoprolol tartrate (LOPRESSOR) tablet 50 mg  50 mg Oral BID    fish oil-omega-3 fatty acids 340-1,000 mg capsule 1 Cap  1 Cap Oral DAILY    clopidogrel (PLAVIX) tablet 75 mg  75 mg Oral DAILY    amLODIPine (NORVASC) tablet 5 mg  5 mg Oral DAILY    simvastatin (ZOCOR) tablet 40 mg  40 mg Oral QHS  0.9% sodium chloride infusion  75 mL/hr IntraVENous CONTINUOUS    acetaminophen (TYLENOL) tablet 650 mg  650 mg Oral Q4H PRN    morphine injection 1 mg  1 mg IntraVENous Q4H PRN    heparin (porcine) injection 5,000 Units  5,000 Units SubCUTAneous Q8H    albuterol (PROVENTIL VENTOLIN) nebulizer solution 2.5 mg  2.5 mg Nebulization Q4H PRN        Review Of Systems:         Constitutional: No fever, no chills, no weight loss, no night sweats   HEENT: No epistaxis, no nasal drainage, no difficulty in swallowing, no redness in eyes  Respiratory: negative for cough, sputum, hemoptysis, pleurisy/chest pain, wheezing, dyspnea on exertion or emphysema  Cardiovascular:  Atypical  chest pain. Gastrointestinal:  Vomiting. Genitourinary: No urinary symptoms or hematuria  Integument/breast: No ulcers or rashes  Musculoskeletal: no muscle pain, no weakness  Neurological: No focal weakness, no seizures, no headaches  Behvioral/Psych: No anxiety, no depression             Physical Exam:     Visit Vitals    /81 (BP 1 Location: Left arm, BP Patient Position: At rest)    Pulse (!) 55    Temp 98.2 °F (36.8 °C)    Resp 18    Ht 5' 6\" (1.676 m)    Wt 89.7 kg (197 lb 11.2 oz)    SpO2 97%    BMI 31.91 kg/m2     BP Readings from Last 3 Encounters:   10/10/17 164/81   06/06/17 110/73   12/13/16 143/87     Pulse Readings from Last 3 Encounters:   10/10/17 (!) 55   06/06/17 63   12/13/16 (!) 57     Wt Readings from Last 3 Encounters:   10/10/17 89.7 kg (197 lb 11.2 oz)   08/18/16 82.5 kg (181 lb 14.4 oz)   08/17/16 77.1 kg (170 lb)       General:  alert, cooperative, no distress, appears stated age, moderately obese  Skin: Warm and dry, acyanotic, normal color. Head: Normocephalic, atraumatic. Eyes: Sclerae anicteric, conjunctivae without injection. Neck:  nontender, no nuchal rigidity, no masses, no stridor, no carotid bruit, no JVD  Lungs:  clear to auscultation bilaterally.    Heart:  regular rate and rhythm, S1, S2 normal, no murmur, click, rub or gallop  Abdomen:  abdomen is soft without significant tenderness, masses, organomegaly or guarding  Extremities:  extremities normal, atraumatic, no cyanosis or edema  Neurological: grossly intact. No focal abnormalities, moves all extremities well. Psychiatric Affect: The patient is awake, alert and oriented x3. Madhuri Crowe is interactive and appropriate.      Data Review:     Recent Results (from the past 48 hour(s))   EKG, 12 LEAD, INITIAL    Collection Time: 10/09/17  2:07 PM   Result Value Ref Range    Ventricular Rate 56 BPM    Atrial Rate 56 BPM    P-R Interval 204 ms    QRS Duration 84 ms    Q-T Interval 452 ms    QTC Calculation (Bezet) 436 ms    Calculated P Axis 20 degrees    Calculated R Axis 14 degrees    Calculated T Axis 71 degrees    Diagnosis       Sinus bradycardia  Otherwise normal ECG  When compared with ECG of 18-SEP-2017 15:20,  Criteria for Inferior infarct are no longer present  Confirmed by John Darnell MD, --- ([id number]) on 10/9/2017 8:52:61 PM     METABOLIC PANEL, BASIC    Collection Time: 10/09/17  2:20 PM   Result Value Ref Range    Sodium 140 136 - 145 mmol/L    Potassium 3.7 3.5 - 5.5 mmol/L    Chloride 106 100 - 108 mmol/L    CO2 24 21 - 32 mmol/L    Anion gap 10 3.0 - 18 mmol/L    Glucose 123 (H) 74 - 99 mg/dL    BUN 14 7.0 - 18 MG/DL    Creatinine 0.91 0.6 - 1.3 MG/DL    BUN/Creatinine ratio 15 12 - 20      GFR est AA >60 >60 ml/min/1.73m2    GFR est non-AA >60 >60 ml/min/1.73m2    Calcium 8.6 8.5 - 10.1 MG/DL   CBC WITH AUTOMATED DIFF    Collection Time: 10/09/17  2:20 PM   Result Value Ref Range    WBC 5.8 4.6 - 13.2 K/uL    RBC 4.15 (L) 4.20 - 5.30 M/uL    HGB 13.0 12.0 - 16.0 g/dL    HCT 38.0 35.0 - 45.0 %    MCV 91.6 74.0 - 97.0 FL    MCH 31.3 24.0 - 34.0 PG    MCHC 34.2 31.0 - 37.0 g/dL    RDW 12.2 11.6 - 14.5 %    PLATELET 322 912 - 974 K/uL    MPV 10.9 9.2 - 11.8 FL    NEUTROPHILS 54 40 - 73 %    LYMPHOCYTES 29 21 - 52 %    MONOCYTES 11 (H) 3 - 10 %    EOSINOPHILS 5 0 - 5 %    BASOPHILS 1 0 - 2 %    ABS. NEUTROPHILS 3.2 1.8 - 8.0 K/UL    ABS. LYMPHOCYTES 1.7 0.9 - 3.6 K/UL    ABS. MONOCYTES 0.6 0.05 - 1.2 K/UL    ABS. EOSINOPHILS 0.3 0.0 - 0.4 K/UL    ABS.  BASOPHILS 0.0 0.0 - 0.06 K/UL    DF AUTOMATED     TROPONIN I    Collection Time: 10/09/17  2:20 PM   Result Value Ref Range    Troponin-I, Qt. <0.02 0.00 - 0.06 NG/ML   EKG, 12 LEAD, SUBSEQUENT    Collection Time: 10/09/17  2:51 PM   Result Value Ref Range    Ventricular Rate 49 BPM    Atrial Rate 49 BPM    P-R Interval 208 ms    QRS Duration 80 ms    Q-T Interval 486 ms    QTC Calculation (Bezet) 439 ms    Calculated P Axis 25 degrees    Calculated T Axis 69 degrees    Diagnosis       Marked sinus bradycardia  Anterolateral infarct , age undetermined  Abnormal ECG  When compared with ECG of 09-OCT-2017 14:07,  Anterior infarct is now present  Anterolateral infarct is now present  Nonspecific T wave abnormality, worse in Anterolateral leads  Confirmed by Shanell Anderson MD, --- ([id number]) on 10/9/2017 5:16:31 PM     CARDIAC PANEL,(CK, CKMB & TROPONIN)    Collection Time: 10/09/17  8:40 PM   Result Value Ref Range    CK 79 26 - 192 U/L    CK - MB <1.0 <3.6 ng/ml    CK-MB Index  0.0 - 4.0 %     CALCULATION NOT PERFORMED WHEN RESULT IS BELOW LINEAR LIMIT    Troponin-I, Qt. <0.02 0.0 - 0.045 NG/ML   CARDIAC PANEL,(CK, CKMB & TROPONIN)    Collection Time: 10/10/17  2:10 AM   Result Value Ref Range    CK 74 26 - 192 U/L    CK - MB <1.0 <3.6 ng/ml    CK-MB Index  0.0 - 4.0 %     CALCULATION NOT PERFORMED WHEN RESULT IS BELOW LINEAR LIMIT    Troponin-I, Qt. <0.02 0.0 - 0.045 NG/ML   PROTHROMBIN TIME + INR    Collection Time: 10/10/17  2:10 AM   Result Value Ref Range    Prothrombin time 13.5 11.5 - 15.2 sec    INR 1.1 0.8 - 1.2     PTT    Collection Time: 10/10/17  2:10 AM   Result Value Ref Range    aPTT 32.0 23.0 - 36.4 SEC   CBC WITH AUTOMATED DIFF    Collection Time: 10/10/17  2:10 AM   Result Value Ref Range    WBC 5.1 4.6 - 13.2 K/uL    RBC 3.89 (L) 4.20 - 5.30 M/uL    HGB 12.1 12.0 - 16.0 g/dL    HCT 36.1 35.0 - 45.0 %    MCV 92.8 74.0 - 97.0 FL    MCH 31.1 24.0 - 34.0 PG    MCHC 33.5 31.0 - 37.0 g/dL    RDW 12.5 11.6 - 14.5 %    PLATELET 135 256 - 122 K/uL    MPV 11.3 9.2 - 11.8 FL    NEUTROPHILS 45 40 - 73 %    LYMPHOCYTES 41 21 - 52 %    MONOCYTES 8 3 - 10 %    EOSINOPHILS 5 0 - 5 %    BASOPHILS 1 0 - 2 %    ABS. NEUTROPHILS 2.4 1.8 - 8.0 K/UL    ABS. LYMPHOCYTES 2.1 0.9 - 3.6 K/UL    ABS. MONOCYTES 0.4 0.05 - 1.2 K/UL    ABS. EOSINOPHILS 0.2 0.0 - 0.4 K/UL    ABS. BASOPHILS 0.0 0.0 - 0.1 K/UL    DF AUTOMATED     METABOLIC PANEL, COMPREHENSIVE    Collection Time: 10/10/17  2:10 AM   Result Value Ref Range    Sodium 144 136 - 145 mmol/L    Potassium 3.5 3.5 - 5.5 mmol/L    Chloride 110 (H) 100 - 108 mmol/L    CO2 24 21 - 32 mmol/L    Anion gap 10 3.0 - 18 mmol/L    Glucose 99 74 - 99 mg/dL    BUN 10 7.0 - 18 MG/DL    Creatinine 0.87 0.6 - 1.3 MG/DL    BUN/Creatinine ratio 11 (L) 12 - 20      GFR est AA >60 >60 ml/min/1.73m2    GFR est non-AA >60 >60 ml/min/1.73m2    Calcium 8.0 (L) 8.5 - 10.1 MG/DL    Bilirubin, total 0.4 0.2 - 1.0 MG/DL    ALT (SGPT) 16 13 - 56 U/L    AST (SGOT) 24 15 - 37 U/L    Alk. phosphatase 64 45 - 117 U/L    Protein, total 6.2 (L) 6.4 - 8.2 g/dL    Albumin 3.3 (L) 3.4 - 5.0 g/dL    Globulin 2.9 2.0 - 4.0 g/dL    A-G Ratio 1.1 0.8 - 1.7           Intake/Output Summary (Last 24 hours) at 10/10/17 0839  Last data filed at 10/10/17 0655   Gross per 24 hour   Intake           736.25 ml   Output              150 ml   Net           586.25 ml       Cardiographics:     ECG: NSR w/o acute ST-T changes           Signed By: Mahendra Mayen NP     October 10, 2017      Patient seen independently  Discussed the details with NP and patient.  Please see orders & recommendations  Daniel Fischer MD

## 2017-10-10 NOTE — DISCHARGE INSTRUCTIONS
Angina: Care Instructions  Your Care Instructions    You have a problem called angina. Angina happens when there is not enough blood flow to your heart muscle. Angina is a sign of coronary artery disease (CAD). CAD occurs when blood vessels that supply the heart become narrowed. Having CAD increases your risk of a heart attack. Chest pain or pressure is the most common symptom of angina. But some people have other symptoms, like:  · Pain, pressure, or a strange feeling in the back, neck, jaw, or upper belly, or in one or both shoulders or arms. · Shortness of breath. · Nausea or vomiting. · Lightheadedness or sudden weakness. · Fast or irregular heartbeat. Women are somewhat more likely than men to have angina symptoms like shortness of breath, nausea, and back or jaw pain. Angina can be dangerous. That's why it is important to pay attention to your symptoms. Know what is typical for you, learn how to control your symptoms, and understand when you need to get treatment. A change in your usual pattern of symptoms is an emergency. It may mean that you are having a heart attack. The doctor has checked you carefully, but problems can develop later. If you notice any problems or new symptoms, get medical treatment right away. Follow-up care is a key part of your treatment and safety. Be sure to make and go to all appointments, and call your doctor if you are having problems. It's also a good idea to know your test results and keep a list of the medicines you take. How can you care for yourself at home? Medicines  · If your doctor has given you nitroglycerin for angina symptoms, keep it with you at all times. If you have symptoms, sit down and rest, and take the first dose of nitroglycerin as directed. If your symptoms get worse or are not getting better within 5 minutes, call 911 right away. Stay on the phone. The emergency  will give you further instructions.   · If your doctor advises it, take 1 low-dose aspirin a day to prevent heart attack. · Be safe with medicines. Take your medicines exactly as prescribed. Call your doctor if you think you are having a problem with your medicine. You will get more details on the specific medicines your doctor prescribes. Lifestyle changes  · Do not smoke. If you need help quitting, talk to your doctor about stop-smoking programs and medicines. These can increase your chances of quitting for good. · Eat a heart-healthy diet that is low in saturated fat and salt, and is high in fiber. Talk to your doctor or a dietitian about healthy eating. · Stay at a healthy weight. Or lose weight if you need to. Activity  · Talk to your doctor about a level of activity that is safe for you. · If an activity causes angina symptoms, stop and rest.  When should you call for help? Call 911 anytime you think you may need emergency care. For example, call if:  · You passed out (lost consciousness). · You have symptoms of a heart attack. These may include:  ¨ Chest pain or pressure, or a strange feeling in the chest.  ¨ Sweating. ¨ Shortness of breath. ¨ Nausea or vomiting. ¨ Pain, pressure, or a strange feeling in the back, neck, jaw, or upper belly or in one or both shoulders or arms. ¨ Lightheadedness or sudden weakness. ¨ A fast or irregular heartbeat. After you call 911, the  may tell you to chew 1 adult-strength or 2 to 4 low-dose aspirin. Wait for an ambulance. Do not try to drive yourself. · You have angina symptoms that do not go away with rest or are not getting better within 5 minutes after you take a dose of nitroglycerin. Call your doctor now or seek immediate medical care if:  · You are having angina symptoms more often than usual, or they are different or worse than usual.  · You feel dizzy or lightheaded, or you feel like you may faint. Watch closely for changes in your health, and be sure to contact your doctor if you have any problems.   Where can you learn more? Go to http://florecita-georgie.info/. Enter H129 in the search box to learn more about \"Angina: Care Instructions. \"  Current as of: April 3, 2017  Content Version: 11.3  © 1748-7265 [employer]. Care instructions adapted under license by [employer] (which disclaims liability or warranty for this information). If you have questions about a medical condition or this instruction, always ask your healthcare professional. [address] any warranty or liability for your use of this information. Asthma Attack: Care Instructions  Your Care Instructions    During an asthma attack, the airways swell and narrow. This makes it hard to breathe. Severe asthma attacks can be life-threatening, but you can help prevent them by keeping your asthma under control and treating symptoms before they get bad. Symptoms include being short of breath, having chest tightness, coughing, and wheezing. Noting and treating these symptoms can also help you avoid future trips to the emergency room. The doctor has checked you carefully, but problems can develop later. If you notice any problems or new symptoms, get medical treatment right away. Follow-up care is a key part of your treatment and safety. Be sure to make and go to all appointments, and call your doctor if you are having problems. It's also a good idea to know your test results and keep a list of the medicines you take. How can you care for yourself at home? · Follow your asthma action plan to prevent and treat attacks. If you don't have an asthma action plan, work with your doctor to create one. · Take your asthma medicines exactly as prescribed. Talk to your doctor right away if you have any questions about how to take them. ¨ Use your quick-relief medicine when you have symptoms of an attack. Quick-relief medicine is usually an albuterol inhaler.  Some people need to use quick-relief medicine before they exercise. ¨ Take your controller medicine every day, not just when you have symptoms. Controller medicine is usually an inhaled corticosteroid. The goal is to prevent problems before they occur. Don't use your controller medicine to treat an attack that has already started. It doesn't work fast enough to help. ¨ If your doctor prescribed corticosteroid pills to use during an attack, take them exactly as prescribed. It may take hours for the pills to work, but they may make the episode shorter and help you breathe better. ¨ Keep your quick-relief medicine with you at all times. · Talk to your doctor before using other medicines. Some medicines, such as aspirin, can cause asthma attacks in some people. · If you have a peak flow meter, use it to check how well you are breathing. This can help you predict when an asthma attack is going to occur. Then you can take medicine to prevent the asthma attack or make it less severe. · Do not smoke or allow others to smoke around you. Avoid smoky places. Smoking makes asthma worse. If you need help quitting, talk to your doctor about stop-smoking programs and medicines. These can increase your chances of quitting for good. · Learn what triggers an asthma attack for you, and avoid the triggers when you can. Common triggers include colds, smoke, air pollution, dust, pollen, mold, pets, cockroaches, stress, and cold air. · Avoid colds and the flu. Get a pneumococcal vaccine shot. If you have had one before, ask your doctor if you need a second dose. Get a flu vaccine every fall. If you must be around people with colds or the flu, wash your hands often. When should you call for help? Call 911 anytime you think you may need emergency care. For example, call if:  · You have severe trouble breathing. Call your doctor now or seek immediate medical care if:  · Your symptoms do not get better after you have followed your asthma action plan.   · You have new or worse trouble breathing. · Your coughing and wheezing get worse. · You cough up dark brown or bloody mucus (sputum). · You have a new or higher fever. Watch closely for changes in your health, and be sure to contact your doctor if:  · You need to use quick-relief medicine on more than 2 days a week (unless it is just for exercise). · You cough more deeply or more often, especially if you notice more mucus or a change in the color of your mucus. · You are not getting better as expected. Where can you learn more? Go to http://florecita-georgie.info/. Enter I677 in the search box to learn more about \"Asthma Attack: Care Instructions. \"  Current as of: March 25, 2017  Content Version: 11.3  © 5628-0574 [employer]. Care instructions adapted under license by [employer] (which disclaims liability or warranty for this information). If you have questions about a medical condition or this instruction, always ask your healthcare professional. [place] [address] any warranty or liability for your use of this information. Patient armband removed and shredded    MyChart Activation    Thank you for requesting access to [facility]. Please follow the instructions below to securely access and download your online medical record. [facility] allows you to send messages to your doctor, view your test results, renew your prescriptions, schedule appointments, and more. How Do I Sign Up? 1. In your internet browser, go to [email or web address]  2. Click on the First Time User? Click Here link in the Sign In box. You will be redirect to the New Member Sign Up page. 3. Enter your [facility] Access Code exactly as it appears below. You will not need to use this code after youve completed the sign-up process. If you do not sign up before the expiration date, you must request a new code.     [facility] Access Code: [email or web address]  Expires: 1/8/2018  4:39 PM (This is the date your [facility] access code will )    4. Enter the last four digits of your Social Security Number (xxxx) and Date of Birth (mm/dd/yyyy) as indicated and click Submit. You will be taken to the next sign-up page. 5. Create a [employer] ID. This will be your [facility] login ID and cannot be changed, so think of one that is secure and easy to remember. 6. Create a [facility] password. You can change your password at any time. 7. Enter your Password Reset Question and Answer. This can be used at a later time if you forget your password. 8. Enter your e-mail address. You will receive e-mail notification when new information is available in [address] 9. Click Sign Up. You can now view and download portions of your medical record. 10. Click the Download Summary menu link to download a portable copy of your medical information. Additional Information    If you have questions, please visit the Frequently Asked Questions section of the [employer] website at [email or web address] Remember, [facility] is NOT to be used for urgent needs. For medical emergencies, dial 911. DISCHARGE SUMMARY from Nurse    The following personal items are in your possession at time of discharge:    Dental Appliances: None  Visual Aid: Glasses, With patient     Home Medications: None  Jewelry: Necklace  Clothing: Pants, With patient, Shirt, Undergarments, Footwear  Other Valuables: Cell Phone, Eyeglasses, Purse             PATIENT INSTRUCTIONS:    After general anesthesia or intravenous sedation, for 24 hours or while taking prescription Narcotics:  · Limit your activities  · Do not drive and operate hazardous machinery  · Do not make important personal or business decisions  · Do  not drink alcoholic beverages  · If you have not urinated within 8 hours after discharge, please contact your surgeon on call.     Report the following to your surgeon:  · Excessive pain, swelling, redness or odor of or around the surgical area  · Temperature over 100.5  · Nausea and vomiting lasting longer than 4 hours or if unable to take medications  · Any signs of decreased circulation or nerve impairment to extremity: change in color, persistent  numbness, tingling, coldness or increase pain  · Any questions        What to do at Home:  Recommended activity: Activity as tolerated    If you experience any of the following symptoms Nausea, vomiting, diarrhea, fever greater than 100.5, dizziness, severe headache, shortness of breath, chest pain, increased pain, please follow up with PCP. *  Please give a list of your current medications to your Primary Care Provider. *  Please update this list whenever your medications are discontinued, doses are      changed, or new medications (including over-the-counter products) are added. *  Please carry medication information at all times in case of emergency situations. These are general instructions for a healthy lifestyle:    No smoking/ No tobacco products/ Avoid exposure to second hand smoke    Surgeon General's Warning:  Quitting smoking now greatly reduces serious risk to your health. Obesity, smoking, and sedentary lifestyle greatly increases your risk for illness    A healthy diet, regular physical exercise & weight monitoring are important for maintaining a healthy lifestyle    You may be retaining fluid if you have a history of heart failure or if you experience any of the following symptoms:  Weight gain of 3 pounds or more overnight or 5 pounds in a week, increased swelling in our hands or feet or shortness of breath while lying flat in bed. Please call your doctor as soon as you notice any of these symptoms; do not wait until your next office visit.     Recognize signs and symptoms of STROKE:    F-face looks uneven    A-arms unable to move or move unevenly    S-speech slurred or non-existent    T-time-call 911 as soon as signs and symptoms begin-DO NOT go       Back to bed or wait to see if you get better-TIME IS BRAIN. Warning Signs of HEART ATTACK     Call 911 if you have these symptoms:   Chest discomfort. Most heart attacks involve discomfort in the center of the chest that lasts more than a few minutes, or that goes away and comes back. It can feel like uncomfortable pressure, squeezing, fullness, or pain.  Discomfort in other areas of the upper body. Symptoms can include pain or discomfort in one or both arms, the back, neck, jaw, or stomach.  Shortness of breath with or without chest discomfort.  Other signs may include breaking out in a cold sweat, nausea, or lightheadedness. Don't wait more than five minutes to call 911 - MINUTES MATTER! Fast action can save your life. Calling 911 is almost always the fastest way to get lifesaving treatment. Emergency Medical Services staff can begin treatment when they arrive -- up to an hour sooner than if someone gets to the hospital by car. The discharge information has been reviewed with the patient. The patient verbalized understanding. Discharge medications reviewed with the patient and appropriate educational materials and side effects teaching were provided.

## 2017-10-10 NOTE — PROGRESS NOTES
Patient was given 10.08 mCi of Sestamibi for the Resting pictures. Patient received 0.4 mg of Lexiscan for the exercise portion of the Stress test. Patient was then given 33 mCi of Sestamibi for the Stress pictures. Patient went back to room with armband still on.

## 2017-10-10 NOTE — PROGRESS NOTES
Care Management Interventions  PCP Verified by CM: Yes  Current Support Network: Lives with Spouse  Confirm Follow Up Transport: Family  Plan discussed with Pt/Family/Caregiver: Yes     Pt is a 60year old female admitted for chest pain. Pt is alert and oriented in room with her spouse Robert Lundberg at her bedside. Pt's plan is to return home and spouse will assist with transporting her. Pt indicates being independent with ADLs. Pt is being discharged home today.

## 2017-10-10 NOTE — PROGRESS NOTES
Problem: Falls - Risk of  Goal: *Absence of Falls  Document Sarah Fall Risk and appropriate interventions in the flowsheet.    Outcome: Progressing Towards Goal  Fall Risk Interventions:  Medication Interventions: Teach patient to arise slowly

## 2017-10-11 NOTE — DISCHARGE SUMMARY
[facility] [address]  [address] [place] Kuldeep Cohen SUMMARY    Name:  Lito Mckeon  MR#:  [id number]  :  1955  Account #:  [de-identified]  Date of Adm:  10/09/2017  Date of Discharge:  10/10/2017      DISPOSITION: Discharged to home. DISCHARGE CONDITION: Stable. DISCHARGE DIAGNOSES  1. Atypical chest pain, now resolved. 2. Coronary artery disease, status post stenting in left anterior  descending in the past.  3. Hypertension. 4. Hyperlipidemia. 5. Chronic intermittent mild asthma, no exacerbation. 6. Gastroesophageal reflux disease. DISCHARGE MEDICATIONS  1. Zantac 150 mg twice a day for 5 days. 2. Zocor 40 mg daily. 3. Norvasc 5 mg daily. 4. Aspirin 81 mg daily. 5. Plavix 75 mg daily. 6. Lopressor 50 mg b.i.d.  7. Nitroglycerin sublingual p.r.n. for chest pain. 8. Fish oil, 1 capsule daily. 9. Pyridium 100 mg as needed. 10. Zofran 4 mg every 8 hours p.r.n. for nausea/vomiting. 11. Trazodone 100 mg at bedtime as needed for sleep. IMAGING AND PROCEDURES  1. Chest x-ray was done, reported negative for acute findings. 2. Nuclear stress test was done, reported no acute ST-T wave  changes, negative stress test.    CONSULTANTS: Cardiology, Dr. Ketty Becerril. HOSPITAL COURSE: The patient was admitted to the hospital on  2017, with a complaint of chest pain. Please refer to  hospital admission H and P done by Dr. Serene Hodgkin for further details. The  patient had 2 sets of cardiac enzymes negative. EKG did not show any  acute ST-T wave changes. The patient was continued on her home  medications, was seen by cardiologist and recommended a stress test.  Stress test was negative. The patient was cleared by the cardiologist to  be discharged home. The patient was feeling much better. She told me  she had some retrosternal chest pain with vomiting at the time of  admission, felt like she had acute GERD. The patient will be prescribed  Zantac for 5 days and will be followed as an outpatient.  She has been  set up with Ms. Lawler as a PCP. The patient will be discharged  home with the above mentioned medication. DISCHARGE INSTRUCTIONS  1. Diet: Cardiac diet. 2. Activity as tolerated. 3. Follow up with PCP in 5-7 days. 4. Follow up with Dr. Matthew Novoa in 2 weeks. Total time is greater than 35 minutes. MD JAIRON Hankins / MD  D:  10/10/2017   15:59  T:  10/11/2017   04:37  Job #:  [id number]    Ms. Melani Colón

## 2017-10-23 RX ORDER — CLOPIDOGREL BISULFATE 75 MG/1
75 TABLET ORAL DAILY
Qty: 90 TAB | Refills: 3 | Status: SHIPPED | OUTPATIENT
Start: 2017-10-23 | End: 2018-12-03 | Stop reason: SDUPTHER

## 2017-10-23 RX ORDER — AMLODIPINE BESYLATE 5 MG/1
TABLET ORAL
Qty: 30 TAB | Refills: 3 | Status: SHIPPED | OUTPATIENT
Start: 2017-10-23 | End: 2017-12-13 | Stop reason: SDUPTHER

## 2017-11-07 ENCOUNTER — OFFICE VISIT (OUTPATIENT)
Dept: CARDIOLOGY CLINIC | Age: 62
End: 2017-11-07

## 2017-11-07 VITALS — SYSTOLIC BLOOD PRESSURE: 136 MMHG | DIASTOLIC BLOOD PRESSURE: 77 MMHG | HEIGHT: 66 IN | HEART RATE: 57 BPM

## 2017-11-07 DIAGNOSIS — E78.5 HYPERLIPIDEMIA, UNSPECIFIED HYPERLIPIDEMIA TYPE: Chronic | ICD-10-CM

## 2017-11-07 DIAGNOSIS — Z95.5 PRESENCE OF STENT IN LAD CORONARY ARTERY: ICD-10-CM

## 2017-11-07 DIAGNOSIS — I25.10 CORONARY ARTERY DISEASE INVOLVING NATIVE CORONARY ARTERY OF NATIVE HEART WITHOUT ANGINA PECTORIS: Primary | ICD-10-CM

## 2017-11-07 DIAGNOSIS — I10 ESSENTIAL HYPERTENSION: Chronic | ICD-10-CM

## 2017-11-07 RX ORDER — AMLODIPINE BESYLATE 5 MG/1
5 TABLET ORAL DAILY
Qty: 30 TAB | Refills: 3 | Status: CANCELLED | OUTPATIENT
Start: 2017-11-07

## 2017-11-07 NOTE — PROGRESS NOTES
HISTORY OF PRESENT ILLNESS  Mireya Le is a 60 y.o. female. Chest Pain (Angina)    The history is provided by the patient. This is a chronic problem. The current episode started more than 1 week ago. The problem has been rapidly improving. The problem occurs rarely. The pain is associated with exertion and normal activity. The pain is present in the substernal region. The pain is at a severity of 1/10. The pain is mild. The quality of the pain is described as tightness. The pain does not radiate. Pertinent negatives include no claudication, no cough, no diaphoresis, no dizziness, no fever, no hemoptysis, no leg pain, no lower extremity edema, no malaise/fatigue, no nausea, no near-syncope, no orthopnea, no palpitations, no PND, no sputum production, no syncope, no vomiting and no weakness. She has tried rest and aspirin for the symptoms. The treatment provided moderate relief. Risk factors include cardiac disease, dyslipidemia and hypertension. Her past medical history is significant for HTN. Procedural history includes cardiac catheterization, echocardiogram and cardiac stents. Hypertension   The history is provided by the patient. This is a chronic problem. The current episode started more than 1 week ago. Shortness of Breath   The history is provided by the patient. This is a chronic problem. The problem occurs rarely. The current episode started more than 1 week ago. The problem has been rapidly improving. Pertinent negatives include no fever, no ear pain, no neck pain, no cough, no sputum production, no hemoptysis, no wheezing, no PND, no orthopnea, no syncope, no vomiting, no rash, no leg pain, no leg swelling and no claudication. She has had prior hospitalizations. She has had prior ED visits. Associated medical issues include CAD. Associated medical issues do not include heart failure. Review of Systems   Constitutional: Negative for chills, diaphoresis, fever, malaise/fatigue and weight loss. HENT: Negative for congestion, ear discharge, ear pain, hearing loss, nosebleeds and tinnitus. Eyes: Negative for blurred vision. Respiratory: Negative for cough, hemoptysis, sputum production, wheezing and stridor. Cardiovascular: Negative for palpitations, orthopnea, claudication, leg swelling, syncope, PND and near-syncope. Gastrointestinal: Negative for heartburn, nausea and vomiting. Musculoskeletal: Negative for myalgias and neck pain. Skin: Negative for itching and rash. Neurological: Negative for dizziness, tingling, tremors, focal weakness, loss of consciousness and weakness. Psychiatric/Behavioral: Negative for depression and suicidal ideas. Family History   Problem Relation Age of Onset   [facility] Cancer Mother     Heart Disease Father     Hypertension Father     Elevated Lipids Father     Diabetes Neg Hx     Stroke Neg Hx        Past Medical History:   Diagnosis Date    CAD (coronary artery disease)     HTN (hypertension)     Hyperlipidemia     IC (interstitial cystitis)     MI (myocardial infarction)        Past Surgical History:   Procedure Laterality Date    CARDIAC SURG PROCEDURE UNLIST      HX APPENDECTOMY      HX CHOLECYSTECTOMY      HX CORONARY STENT PLACEMENT  2007    HX CORONARY STENT PLACEMENT      HX HYSTERECTOMY      HX TUBAL LIGATION Bilateral        Social History   Substance Use Topics    Smoking status: Never Smoker    Smokeless tobacco: Never Used    Alcohol use Yes      Comment: socially       Allergies   Allergen Reactions    Latex, Natural Rubber Rash    Adhesive Rash    Macrobid [Nitrofurantoin Monohyd/M-Cryst] Nausea and Vomiting       Outpatient Prescriptions Marked as Taking for the 11/7/17 encounter (Office Visit) with Marisa Beltran MD   Medication Sig Dispense Refill    amLODIPine (NORVASC) 5 mg tablet TAKE 1 TAB BY MOUTH DAILY. 30 Tab 3    clopidogrel (PLAVIX) 75 mg tab Take 1 Tab by mouth daily.  90 Tab 3    simvastatin (ZOCOR) 40 mg tablet Take 1 Tab by mouth nightly. 30 Tab 6    Omega-3-DHA-EPA-Fish Oil 1,000 mg (120 mg-180 mg) cap Take  by mouth.  metoprolol tartrate (LOPRESSOR) 50 mg tablet Take 1 Tab by mouth two (2) times a day. 60 Tab 6    nitroglycerin (NITROSTAT) 0.4 mg SL tablet by SubLINGual route every five (5) minutes as needed for Chest Pain.  aspirin 81 mg chewable tablet Take 81 mg by mouth daily.  albuterol (PROAIR HFA) 90 mcg/actuation inhaler Take  by inhalation. Visit Vitals    /77    Pulse (!) 57    Ht 5' 6\" (1.676 m)     Physical Exam   Constitutional: She is oriented to person, place, and time. She appears well-developed and well-nourished. No distress. HENT:   Head: Atraumatic. Mouth/Throat: No oropharyngeal exudate. Eyes: Conjunctivae are normal. Right eye exhibits no discharge. Left eye exhibits no discharge. No scleral icterus. Neck: Normal range of motion. Neck supple. No JVD present. No tracheal deviation present. No thyromegaly present. Cardiovascular: Normal rate, regular rhythm and normal heart sounds. Exam reveals no gallop. No murmur heard. Pulmonary/Chest: Effort normal and breath sounds normal. No stridor. No respiratory distress. She has no wheezes. She has no rales. She exhibits no tenderness. Abdominal: Soft. There is no tenderness. There is no rebound. Musculoskeletal: Normal range of motion. She exhibits no edema or tenderness. Lymphadenopathy:     She has no cervical adenopathy. Neurological: She is alert and oriented to person, place, and time. She exhibits normal muscle tone. Skin: Skin is warm. She is not diaphoretic. Psychiatric: She has a normal mood and affect. Her behavior is normal.     ekg sinus rhythm with t wave inversion in anterior and inferior leads. [facility]  CONCLUSION:  1. Normal perfusion scan. 2. Normal wall motion and ejection fraction. ASSESSMENT and PLAN    ICD-10-CM ICD-9-CM    1.  Coronary artery disease involving native coronary artery of native heart without angina pectoris I25.10 414.01    2. Essential hypertension I10 401.9 AMB POC EKG ROUTINE W/ 12 LEADS, INTER & REP   3. Hyperlipidemia, unspecified hyperlipidemia type E78.5 272.4 LIPID PANEL      HEMOGLOBIN A1C W/O EAG   4. Presence of stent in LAD coronary artery Z95.5 V45.82      Orders Placed This Encounter    LIPID PANEL     Standing Status:   Future     Standing Expiration Date:   11/8/2018    HEMOGLOBIN A1C W/O EAG     Standing Status:   Future     Standing Expiration Date:   11/8/2018    AMB POC EKG ROUTINE W/ 12 LEADS, INTER & REP     Order Specific Question:   Reason for Exam:     Answer:   htn     Follow-up Disposition:  Return in about 6 months (around 5/7/2018). cardiovascular risk and specific lipid/LDL goals reviewed  use of aspirin to prevent MI and TIA's discussed. Patient  s/p PTCA for severe instent stenosis to LAD. Now significantly better. Continue aspirin and plavix. Continue intense risk factor modification.   Will repeat lipid panel and HBa1c  Will f/u in 6 months

## 2017-11-07 NOTE — MR AVS SNAPSHOT
Visit Information Date & Time Provider Department Dept. Phone Encounter #  
 11/7/2017 12:15 PM Tom Lora MD [facility] [address] [facility] [id number] Follow-up Instructions Return in about 6 months (around 5/7/2018). Follow-up and Disposition History Your Appointments 11/15/2017  1:30 PM  
PROCEDURE with [facility] [place] ([address]) Appt Note: echo george blanka [address] [facility], [address] [facility] [address]  
[address], [address] [address]  
  
    
 12/5/2017  1:00 PM  
Office Visit with Tom Lora MD  
[facility]) Appt Note: [address] [facility], [address] [facility] [address]  
[address], [address] [facility] [address] [facility] Upcoming Health Maintenance Date Due Hepatitis C Screening 1955 Pneumococcal 19-64 Medium Risk (1 of 1 - PPSV23) 6/24/1973 DTaP/Tdap/Td series (1 - Tdap) 6/24/1975 FOBT Q 1 YEAR AGE 50-75 6/24/2004 ZOSTER VACCINE AGE 60> 4/24/2014 PAP AKA CERVICAL CYTOLOGY 9/5/2016 BREAST CANCER SCRN MAMMOGRAM 5/26/2017 Influenza Age 5 to Adult 8/1/2017 Allergies as of 11/7/2017  Review Complete On: 11/7/2017 By: Tom Lora MD  
  
 Severity Noted Reaction Type Reactions Latex, Natural Rubber  10/10/2017    Rash Adhesive  07/20/2015    Rash Macrobid [Nitrofurantoin Monohyd/m-cryst]  07/20/2015    Nausea and Vomiting Current Immunizations  Reviewed on 8/17/2016 No immunizations on file. Not reviewed this visit You Were Diagnosed With   
  
 Codes Comments Coronary artery disease involving native coronary artery of native heart without angina pectoris    -  Primary ICD-10-CM: I25.10 ICD-9-CM: 414.01 Essential hypertension     ICD-10-CM: I10 
ICD-9-CM: 401.9 Hyperlipidemia, unspecified hyperlipidemia type     ICD-10-CM: E78.5 ICD-9-CM: 272.4 Presence of stent in LAD coronary artery     ICD-10-CM: Z95.5 ICD-9-CM: V45.82 Vitals BP Pulse Height(growth percentile) OB Status Smoking Status 136/77 (!) 57 5' 6\" (1.676 m) Menopause Never Smoker Vitals History Preferred Pharmacy Pharmacy Name Phone [id number]- [place], [place]ande Simms Your Updated Medication List  
  
   
This list is accurate as of: 11/7/17  1:41 PM.  Always use your most recent med list. amLODIPine 5 mg tablet Commonly known as:  Sethi Manoj TAKE 1 TAB BY MOUTH DAILY. aspirin 81 mg chewable tablet Take 81 mg by mouth daily. clopidogrel 75 mg Tab Commonly known as:  PLAVIX Take 1 Tab by mouth daily. metoprolol tartrate 50 mg tablet Commonly known as:  LOPRESSOR Take 1 Tab by mouth two (2) times a day. NITROSTAT 0.4 mg SL tablet Generic drug:  nitroglycerin  
by SubLINGual route every five (5) minutes as needed for Chest Pain. Omega-3-DHA-EPA-Fish Oil 1,000 mg (120 mg-180 mg) Cap Take  by mouth. ondansetron 4 mg disintegrating tablet Commonly known as:  ZOFRAN ODT Take 1 Tab by mouth every eight (8) hours as needed for Nausea. PROAIR HFA 90 mcg/actuation inhaler Generic drug:  albuterol Take  by inhalation. PYRIDIUM 100 mg tablet Generic drug:  phenazopyridine Take 100 mg by mouth as needed. simvastatin 40 mg tablet Commonly known as:  ZOCOR Take 1 Tab by mouth nightly. traZODone 100 mg tablet Commonly known as:  Bridgett Au Take 100 mg by mouth as needed. We Performed the Following AMB POC EKG ROUTINE W/ 12 LEADS, INTER & REP [38200 CPT(R)] Follow-up Instructions Return in about 6 months (around 5/7/2018). To-Do List   
 11/07/2017 Lab:  HEMOGLOBIN A1C W/O EAG   
  
 12/07/2017 Lab: LIPID PANEL Introducing [facility]! [employer] introduces Rangespan patient portal. Now you can access parts of your medical record, email your doctor's office, and request medication refills online. 1. In your internet browser, go to [email or web address] 2. Click on the First Time User? Click Here link in the Sign In box. You will see the New Member Sign Up page. 3. Enter your Rangespan Access Code exactly as it appears below. You will not need to use this code after youve completed the sign-up process. If you do not sign up before the expiration date, you must request a new code. · Rangespan Access Code: [email or web address] Expires: 1/8/2018  3:39 PM 
 
4. Enter the last four digits of your Social Security Number (xxxx) and Date of Birth (mm/dd/yyyy) as indicated and click Submit. You will be taken to the next sign-up page. 5. Create a Rangespan ID. This will be your Rangespan login ID and cannot be changed, so think of one that is secure and easy to remember. 6. Create a Rangespan password. You can change your password at any time. 7. Enter your Password Reset Question and Answer. This can be used at a later time if you forget your password. 8. Enter your e-mail address. You will receive e-mail notification when new information is available in [address] 9. Click Sign Up. You can now view and download portions of your medical record. 10. Click the Download Summary menu link to download a portable copy of your medical information. If you have questions, please visit the Frequently Asked Questions section of the Rangespan website. Remember, Rangespan is NOT to be used for urgent needs. For medical emergencies, dial 911. Now available from your iPhone and Android! Please provide this summary of care documentation to your next provider. If you have any questions after today's visit, please call [phone].

## 2017-11-07 NOTE — PROGRESS NOTES
1. Have you been to the ER, urgent care clinic since your last visit? Hospitalized since your last visit? Yes, [place] chest pain right side radiating to back    2. Have you seen or consulted any other health care providers outside of the [address] [place] [address] since your last visit? Include any pap smears or colon screening. no  3. Since your last visit, have you had any of the following symptoms?          no cardiac symptoms    4. Have you had any blood work, X-rays or cardiac testing? Yes, [facility]          5. Where do you normally have your labs drawn? [facility]    6. Do you need any refills today?    yes

## 2017-12-04 ENCOUNTER — TELEPHONE (OUTPATIENT)
Dept: CARDIOLOGY CLINIC | Age: 62
End: 2017-12-04

## 2017-12-14 RX ORDER — AMLODIPINE BESYLATE 5 MG/1
5 TABLET ORAL DAILY
Qty: 90 TAB | Refills: 1 | Status: SHIPPED | OUTPATIENT
Start: 2017-12-14 | End: 2018-01-11 | Stop reason: SDUPTHER

## 2017-12-14 RX ORDER — SIMVASTATIN 40 MG/1
TABLET, FILM COATED ORAL
Qty: 30 TAB | Refills: 3 | Status: SHIPPED | OUTPATIENT
Start: 2017-12-14 | End: 2018-04-09 | Stop reason: SDUPTHER

## 2018-01-11 RX ORDER — AMLODIPINE BESYLATE 5 MG/1
5 TABLET ORAL DAILY
Qty: 90 TAB | Refills: 1 | Status: SHIPPED | OUTPATIENT
Start: 2018-01-11 | End: 2018-12-21 | Stop reason: SDUPTHER

## 2018-04-09 RX ORDER — SIMVASTATIN 40 MG/1
TABLET, FILM COATED ORAL
Qty: 30 TAB | Refills: 3 | Status: SHIPPED | OUTPATIENT
Start: 2018-04-09 | End: 2018-08-26 | Stop reason: SDUPTHER

## 2018-04-17 ENCOUNTER — HOSPITAL ENCOUNTER (OUTPATIENT)
Dept: MAMMOGRAPHY | Age: 63
Discharge: HOME | End: 2018-04-17
Attending: OBSTETRICS & GYNECOLOGY
Payer: COMMERCIAL

## 2018-04-17 ENCOUNTER — HOSPITAL ENCOUNTER (OUTPATIENT)
Dept: ULTRASOUND IMAGING | Age: 63
Discharge: HOME | End: 2018-04-17
Attending: OBSTETRICS & GYNECOLOGY
Payer: COMMERCIAL

## 2018-04-17 DIAGNOSIS — N63.0 BREAST LUMP: ICD-10-CM

## 2018-04-17 PROCEDURE — 76642 ULTRASOUND BREAST LIMITED: CPT

## 2018-04-17 PROCEDURE — 77062 BREAST TOMOSYNTHESIS BI: CPT

## 2018-04-17 RX ORDER — METOPROLOL SUCCINATE 50 MG/1
TABLET, EXTENDED RELEASE ORAL DAILY
COMMUNITY
End: 2018-04-17 | Stop reason: SDUPTHER

## 2018-04-17 RX ORDER — METOPROLOL SUCCINATE 50 MG/1
50 TABLET, EXTENDED RELEASE ORAL DAILY
Qty: 90 TAB | Refills: 1 | Status: SHIPPED | OUTPATIENT
Start: 2018-04-17 | End: 2018-10-09 | Stop reason: SDUPTHER

## 2018-06-14 ENCOUNTER — HOSPITAL ENCOUNTER (OUTPATIENT)
Dept: LAB | Age: 63
Discharge: HOME | End: 2018-06-14
Payer: COMMERCIAL

## 2018-06-14 DIAGNOSIS — E78.5 HYPERLIPIDEMIA, UNSPECIFIED HYPERLIPIDEMIA TYPE: Chronic | ICD-10-CM

## 2018-06-14 LAB
CHOLEST SERPL-MCNC: 233 MG/DL
HBA1C MFR BLD: 5.4 % (ref 4.2–5.6)
HDLC SERPL-MCNC: 46 MG/DL (ref 40–60)
HDLC SERPL: 5.1 {RATIO} (ref 0–5)
LDLC SERPL CALC-MCNC: 117.2 MG/DL (ref 0–100)
LIPID PROFILE,FLP: ABNORMAL
TRIGL SERPL-MCNC: 349 MG/DL (ref ?–150)
VLDLC SERPL CALC-MCNC: 69.8 MG/DL

## 2018-06-14 PROCEDURE — 36415 COLL VENOUS BLD VENIPUNCTURE: CPT | Performed by: INTERNAL MEDICINE

## 2018-06-14 PROCEDURE — 80061 LIPID PANEL: CPT | Performed by: INTERNAL MEDICINE

## 2018-06-14 PROCEDURE — 83036 HEMOGLOBIN GLYCOSYLATED A1C: CPT | Performed by: INTERNAL MEDICINE

## 2018-07-03 DIAGNOSIS — E78.5 HYPERLIPIDEMIA, UNSPECIFIED HYPERLIPIDEMIA TYPE: Primary | Chronic | ICD-10-CM

## 2018-07-03 RX ORDER — FENOFIBRATE 48 MG/1
48 TABLET, COATED ORAL DAILY
Qty: 30 TAB | Refills: 6 | Status: SHIPPED | OUTPATIENT
Start: 2018-07-03 | End: 2019-03-26 | Stop reason: SDUPTHER

## 2018-07-03 NOTE — PROGRESS NOTES
Start tricor 45 mg daily. Recheck lipid and liver profile in 2 months.  Meanwhile continue zocor and omega 3 fatty acids

## 2018-07-03 NOTE — TELEPHONE ENCOUNTER
----- Message from Padmini Golden MD sent at 7/3/2018  3:45 PM EDT -----  Start tricor 45 mg daily. Recheck lipid and liver profile in 2 months.  Meanwhile continue zocor and omega 3 fatty acids

## 2018-08-27 RX ORDER — SIMVASTATIN 40 MG/1
TABLET, FILM COATED ORAL
Qty: 30 TAB | Refills: 3 | Status: SHIPPED | OUTPATIENT
Start: 2018-08-27 | End: 2018-12-30 | Stop reason: SDUPTHER

## 2018-09-24 ENCOUNTER — HOSPITAL ENCOUNTER (OUTPATIENT)
Dept: LAB | Age: 63
Discharge: HOME | End: 2018-09-24
Payer: COMMERCIAL

## 2018-09-24 ENCOUNTER — OFFICE VISIT (OUTPATIENT)
Dept: CARDIOLOGY CLINIC | Age: 63
End: 2018-09-24

## 2018-09-24 VITALS — HEIGHT: 66 IN | SYSTOLIC BLOOD PRESSURE: 128 MMHG | DIASTOLIC BLOOD PRESSURE: 70 MMHG | HEART RATE: 57 BPM

## 2018-09-24 DIAGNOSIS — E78.5 HYPERLIPIDEMIA, UNSPECIFIED HYPERLIPIDEMIA TYPE: Chronic | ICD-10-CM

## 2018-09-24 DIAGNOSIS — I25.708 CORONARY ARTERY DISEASE OF BYPASS GRAFT OF NATIVE HEART WITH STABLE ANGINA PECTORIS (HCC): ICD-10-CM

## 2018-09-24 DIAGNOSIS — I25.708 CORONARY ARTERY DISEASE OF BYPASS GRAFT OF NATIVE HEART WITH STABLE ANGINA PECTORIS (HCC): Primary | ICD-10-CM

## 2018-09-24 DIAGNOSIS — Z95.5 PRESENCE OF STENT IN LAD CORONARY ARTERY: ICD-10-CM

## 2018-09-24 DIAGNOSIS — I10 HYPERTENSION, UNSPECIFIED TYPE: Chronic | ICD-10-CM

## 2018-09-24 LAB
ALBUMIN SERPL-MCNC: 4.1 G/DL (ref 3.4–5)
ALBUMIN/GLOB SERPL: 1.4 {RATIO} (ref 0.8–1.7)
ALP SERPL-CCNC: 66 U/L (ref 45–117)
ALT SERPL-CCNC: 18 U/L (ref 13–56)
ANION GAP SERPL CALC-SCNC: 6 MMOL/L (ref 3–18)
APTT PPP: 29.2 SEC (ref 23–36.4)
AST SERPL-CCNC: 21 U/L (ref 15–37)
BILIRUB SERPL-MCNC: 0.5 MG/DL (ref 0.2–1)
BUN SERPL-MCNC: 16 MG/DL (ref 7–18)
BUN/CREAT SERPL: 15 (ref 12–20)
CALCIUM SERPL-MCNC: 9.2 MG/DL (ref 8.5–10.1)
CHLORIDE SERPL-SCNC: 108 MMOL/L (ref 100–108)
CHOLEST SERPL-MCNC: 191 MG/DL
CO2 SERPL-SCNC: 29 MMOL/L (ref 21–32)
CREAT SERPL-MCNC: 1.08 MG/DL (ref 0.6–1.3)
ERYTHROCYTE [DISTWIDTH] IN BLOOD BY AUTOMATED COUNT: 12.5 % (ref 11.6–14.5)
GLOBULIN SER CALC-MCNC: 2.9 G/DL (ref 2–4)
GLUCOSE SERPL-MCNC: 100 MG/DL (ref 74–99)
HCT VFR BLD AUTO: 39.5 % (ref 35–45)
HDLC SERPL-MCNC: 50 MG/DL (ref 40–60)
HDLC SERPL: 3.8 {RATIO} (ref 0–5)
HGB BLD-MCNC: 13.4 G/DL (ref 12–16)
INR PPP: 1.1 (ref 0.8–1.2)
LDLC SERPL CALC-MCNC: 77 MG/DL (ref 0–100)
LIPID PROFILE,FLP: ABNORMAL
MAGNESIUM SERPL-MCNC: 2.2 MG/DL (ref 1.6–2.6)
MCH RBC QN AUTO: 30.9 PG (ref 24–34)
MCHC RBC AUTO-ENTMCNC: 33.9 G/DL (ref 31–37)
MCV RBC AUTO: 91.2 FL (ref 74–97)
PLATELET # BLD AUTO: 257 K/UL (ref 135–420)
PMV BLD AUTO: 11.2 FL (ref 9.2–11.8)
POTASSIUM SERPL-SCNC: 4.3 MMOL/L (ref 3.5–5.5)
PROT SERPL-MCNC: 7 G/DL (ref 6.4–8.2)
PROTHROMBIN TIME: 13.4 SEC (ref 11.5–15.2)
RBC # BLD AUTO: 4.33 M/UL (ref 4.2–5.3)
SODIUM SERPL-SCNC: 143 MMOL/L (ref 136–145)
TRIGL SERPL-MCNC: 320 MG/DL (ref ?–150)
TSH SERPL DL<=0.05 MIU/L-ACNC: 3.88 UIU/ML (ref 0.36–3.74)
VLDLC SERPL CALC-MCNC: 64 MG/DL
WBC # BLD AUTO: 6.8 K/UL (ref 4.6–13.2)

## 2018-09-24 PROCEDURE — 80061 LIPID PANEL: CPT | Performed by: INTERNAL MEDICINE

## 2018-09-24 PROCEDURE — 83735 ASSAY OF MAGNESIUM: CPT | Performed by: INTERNAL MEDICINE

## 2018-09-24 PROCEDURE — 36415 COLL VENOUS BLD VENIPUNCTURE: CPT | Performed by: INTERNAL MEDICINE

## 2018-09-24 PROCEDURE — 85730 THROMBOPLASTIN TIME PARTIAL: CPT | Performed by: INTERNAL MEDICINE

## 2018-09-24 PROCEDURE — 84443 ASSAY THYROID STIM HORMONE: CPT | Performed by: INTERNAL MEDICINE

## 2018-09-24 PROCEDURE — 85610 PROTHROMBIN TIME: CPT | Performed by: INTERNAL MEDICINE

## 2018-09-24 PROCEDURE — 85027 COMPLETE CBC AUTOMATED: CPT | Performed by: INTERNAL MEDICINE

## 2018-09-24 PROCEDURE — 80053 COMPREHEN METABOLIC PANEL: CPT | Performed by: INTERNAL MEDICINE

## 2018-09-24 NOTE — MR AVS SNAPSHOT
Leidy Brink 
 
 
 [address] [place] [address], [address] [address] [phone] Patient: Beni Segura MRN: [id number] XOX:[id number] Visit Information Date & Time Provider Department Dept. Phone Encounter #  
 9/24/2018  9:45 AM Sarbjit Saini MD [facility] [address] [place] [address] [id number] Upcoming Health Maintenance Date Due Hepatitis C Screening 1955 Pneumococcal 19-64 Medium Risk (1 of 1 - PPSV23) 6/24/1973 DTaP/Tdap/Td series (1 - Tdap) 6/24/1975 FOBT Q 1 YEAR AGE 50-75 6/24/2004 ZOSTER VACCINE AGE 60> 4/24/2014 PAP AKA CERVICAL CYTOLOGY 9/5/2016 Influenza Age 5 to Adult 8/1/2018 BREAST CANCER SCRN MAMMOGRAM 4/17/2020 Allergies as of 9/24/2018  Review Complete On: 9/24/2018 By: Sharron Brody' Severity Noted Reaction Type Reactions Latex, Natural Rubber  10/10/2017    Rash Adhesive  07/20/2015    Rash Macrobid [Nitrofurantoin Monohyd/m-cryst]  07/20/2015    Nausea and Vomiting Current Immunizations  Reviewed on 8/17/2016 No immunizations on file. Not reviewed this visit You Were Diagnosed With   
  
 Codes Comments Coronary artery disease of bypass graft of native heart with stable angina pectoris ([facility] [address]    -  Primary ICD-10-CM: I25.708 ICD-9-CM: 414.05, 413.9 CCS 3 Hypertension, unspecified type     ICD-10-CM: I10 
ICD-9-CM: 401.9 Hyperlipidemia, unspecified hyperlipidemia type     ICD-10-CM: E78.5 ICD-9-CM: 272.4 Presence of stent in LAD coronary artery     ICD-10-CM: Z95.5 ICD-9-CM: V45.82 Vitals BP Pulse Height(growth percentile) OB Status Smoking Status 128/70 (!) 57 5' 6\" (1.676 m) Menopause Never Smoker Vitals History Preferred Pharmacy Pharmacy Name Phone [id number] [place], [address] [place] Simms Your Updated Medication List  
  
   
 This list is accurate as of 9/24/18 10:54 AM.  Always use your most recent med list. amLODIPine 5 mg tablet Commonly known as:  Pamela Spruce Take 1 Tab by mouth daily. aspirin 81 mg chewable tablet Take 81 mg by mouth daily. clopidogrel 75 mg Tab Commonly known as:  PLAVIX Take 1 Tab by mouth daily. fenofibrate nanocrystallized 48 mg tablet Commonly known as:  [employer] Take 1 Tab by mouth daily. metoprolol succinate 50 mg XL tablet Commonly known as:  TOPROL XL Take 1 Tab by mouth daily. NITROSTAT 0.4 mg SL tablet Generic drug:  nitroglycerin  
by SubLINGual route every five (5) minutes as needed for Chest Pain. omega 3-DHA-EPA-fish oil 1,000 mg (120 mg-180 mg) capsule Take  by mouth. ondansetron 4 mg disintegrating tablet Commonly known as:  ZOFRAN ODT Take 1 Tab by mouth every eight (8) hours as needed for Nausea. PROAIR HFA 90 mcg/actuation inhaler Generic drug:  albuterol Take  by inhalation. PYRIDIUM 100 mg tablet Generic drug:  phenazopyridine Take 100 mg by mouth as needed. simvastatin 40 mg tablet Commonly known as:  ZOCOR  
TAKE 1 TAB BY MOUTH NIGHTLY.  
  
 traZODone 100 mg tablet Commonly known as:  [place] Take 100 mg by mouth as needed. We Performed the Following AMB POC EKG ROUTINE W/ 12 LEADS, INTER & REP [81055 CPT(R)] CASE REQUEST CATH LAB [IGO4911 CPT(R)] To-Do List   
 09/28/2018 Lab:  CBC W/O DIFF   
  
 09/28/2018 Lab:  LIPID PANEL   
  
 09/28/2018 Lab:  MAGNESIUM   
  
 09/28/2018 Lab:  METABOLIC PANEL, COMPREHENSIVE   
  
 09/28/2018 Lab:  PROTHROMBIN TIME + INR   
  
 09/28/2018 Lab:  PTT   
  
 09/28/2018 Lab:  TSH 3RD GENERATION Introducing [facility]! [facility] introduces [employer] patient portal. Now you can access parts of your medical record, email your doctor's office, and request medication refills online. 1. In your internet browser, go to [email or web address] 2. Click on the First Time User? Click Here link in the Sign In box. You will see the New Member Sign Up page. 3. Enter your Apertio Access Code exactly as it appears below. You will not need to use this code after youve completed the sign-up process. If you do not sign up before the expiration date, you must request a new code. · Apertio Access Code: [email or web address] Expires: 11/30/2018  5:22 AM 
 
4. Enter the last four digits of your Social Security Number (xxxx) and Date of Birth (mm/dd/yyyy) as indicated and click Submit. You will be taken to the next sign-up page. 5. Create a FangTooth Studiost ID. This will be your Apertio login ID and cannot be changed, so think of one that is secure and easy to remember. 6. Create a Apertio password. You can change your password at any time. 7. Enter your Password Reset Question and Answer. This can be used at a later time if you forget your password. 8. Enter your e-mail address. You will receive e-mail notification when new information is available in [address] 9. Click Sign Up. You can now view and download portions of your medical record. 10. Click the Download Summary menu link to download a portable copy of your medical information. If you have questions, please visit the Frequently Asked Questions section of the Apertio website. Remember, Apertio is NOT to be used for urgent needs. For medical emergencies, dial 911. Now available from your iPhone and Android! Please provide this summary of care documentation to your next provider. Your primary care clinician is listed as NONE. If you have any questions after today's visit, please call [phone].

## 2018-09-24 NOTE — PROGRESS NOTES
Patient didn't bring medications, verbally reviewed    1. Have you been to the ER, urgent care clinic since your last visit? Hospitalized since your last visit? No    2. Have you seen or consulted any other health care providers outside of the [address] [place] [address] since your last visit? Include any pap smears or colon screening.  No

## 2018-09-25 NOTE — PROGRESS NOTES
HISTORY OF PRESENT ILLNESS  Crow Houston is a 58 y.o. female. Hypertension   The history is provided by the patient. This is a chronic problem. The current episode started more than 1 week ago. Associated symptoms include shortness of breath. Pertinent negatives include no chest pain. Shortness of Breath   The history is provided by the patient. This is a chronic problem. The problem occurs frequently. The current episode started more than 1 week ago. The problem has been gradually worsening. Pertinent negatives include no ear pain, no neck pain, no wheezing, no chest pain, no syncope, no rash and no leg swelling. She has had prior hospitalizations. She has had prior ED visits. Associated medical issues include CAD. Associated medical issues do not include heart failure. Review of Systems   Constitutional: Negative for chills and weight loss. HENT: Negative for congestion, ear discharge, ear pain, hearing loss, nosebleeds and tinnitus. Eyes: Negative for blurred vision. Respiratory: Positive for shortness of breath. Negative for wheezing and stridor. Cardiovascular: Negative for chest pain, leg swelling and syncope. Gastrointestinal: Negative for heartburn. Musculoskeletal: Negative for myalgias and neck pain. Skin: Negative for itching and rash. Neurological: Negative for tingling, tremors, focal weakness and loss of consciousness. Psychiatric/Behavioral: Negative for depression and suicidal ideas.      Family History   Problem Relation Age of Onset   Geoffrey Julien Cancer Mother     Heart Disease Father     Hypertension Father     Elevated Lipids Father     Diabetes Neg Hx     Stroke Neg Hx        Past Medical History:   Diagnosis Date    CAD (coronary artery disease)     HTN (hypertension)     Hyperlipidemia     IC (interstitial cystitis)     MI (myocardial infarction) ([facility] [address]        Past Surgical History:   Procedure Laterality Date    CARDIAC SURG PROCEDURE UNLIST      HX APPENDECTOMY  HX CHOLECYSTECTOMY      HX CORONARY STENT PLACEMENT  2007    HX CORONARY STENT PLACEMENT      HX HYSTERECTOMY      HX TUBAL LIGATION Bilateral        Social History   Substance Use Topics    Smoking status: Never Smoker    Smokeless tobacco: Never Used    Alcohol use Yes      Comment: socially       Allergies   Allergen Reactions    Latex, Natural Rubber Rash    Adhesive Rash    Macrobid [Nitrofurantoin Monohyd/M-Cryst] Nausea and Vomiting       Outpatient Prescriptions Marked as Taking for the 9/24/18 encounter (Office Visit) with Deshaun Vaughan MD   Medication Sig Dispense Refill    simvastatin (ZOCOR) 40 mg tablet TAKE 1 TAB BY MOUTH NIGHTLY. 30 Tab 3    fenofibrate nanocrystallized (TRICOR) 48 mg tablet Take 1 Tab by mouth daily. 30 Tab 6    metoprolol succinate (TOPROL XL) 50 mg XL tablet Take 1 Tab by mouth daily. 90 Tab 1    amLODIPine (NORVASC) 5 mg tablet Take 1 Tab by mouth daily. 90 Tab 1    clopidogrel (PLAVIX) 75 mg tab Take 1 Tab by mouth daily. 90 Tab 3    Omega-3-DHA-EPA-Fish Oil 1,000 mg (120 mg-180 mg) cap Take  by mouth.  ondansetron (ZOFRAN ODT) 4 mg disintegrating tablet Take 1 Tab by mouth every eight (8) hours as needed for Nausea. 14 Tab 0    traZODone (DESYREL) 100 mg tablet Take 100 mg by mouth as needed.  nitroglycerin (NITROSTAT) 0.4 mg SL tablet by SubLINGual route every five (5) minutes as needed for Chest Pain.  phenazopyridine (PYRIDIUM) 100 mg tablet Take 100 mg by mouth as needed.  aspirin 81 mg chewable tablet Take 81 mg by mouth daily.  albuterol (PROAIR HFA) 90 mcg/actuation inhaler Take  by inhalation. Visit Vitals    /70    Pulse (!) 57    Ht 5' 6\" (1.676 m)     Physical Exam   Constitutional: She is oriented to person, place, and time. She appears well-developed and well-nourished. No distress. HENT:   Head: Atraumatic. Mouth/Throat: No oropharyngeal exudate.    Eyes: Conjunctivae are normal. Right eye exhibits no discharge. Left eye exhibits no discharge. No scleral icterus. Neck: Normal range of motion. Neck supple. No JVD present. No tracheal deviation present. No thyromegaly present. Cardiovascular: Normal rate, regular rhythm and normal heart sounds. Exam reveals no gallop. No murmur heard. Pulmonary/Chest: Effort normal and breath sounds normal. No stridor. No respiratory distress. She has no wheezes. She has no rales. She exhibits no tenderness. Abdominal: Soft. There is no tenderness. There is no rebound. Musculoskeletal: Normal range of motion. She exhibits no edema or tenderness. Lymphadenopathy:     She has no cervical adenopathy. Neurological: She is alert and oriented to person, place, and time. She exhibits normal muscle tone. Skin: Skin is warm. She is not diaphoretic. Psychiatric: She has a normal mood and affect. Her behavior is normal.     ekg sinus rhythm with t wave inversion in anterior and inferior leads. [facility]:  CONCLUSION:  1. Normal perfusion scan. 2. Normal wall motion and ejection fraction. ASSESSMENT and PLAN    ICD-10-CM ICD-9-CM    1. Coronary artery disease of native heart with stable angina pectoris (HCC) H59.047 352.44 METABOLIC PANEL, COMPREHENSIVE     413.9 CBC W/O DIFF      MAGNESIUM      TSH 3RD GENERATION      PTT      PROTHROMBIN TIME + INR      LIPID PANEL      CASE REQUEST CATH LAB    CCS 3   2. Hypertension, unspecified type I10 401.9 AMB POC EKG ROUTINE W/ 12 LEADS, INTER & REP   3. Hyperlipidemia, unspecified hyperlipidemia type E78.5 272.4    4.  Presence of stent in LAD coronary artery Z95.5 V45.82      Orders Placed This Encounter    METABOLIC PANEL, COMPREHENSIVE     Standing Status:   Future     Number of Occurrences:   1     Standing Expiration Date:   9/25/2019    CBC W/O DIFF     Standing Status:   Future     Number of Occurrences:   1     Standing Expiration Date:   9/25/2019    MAGNESIUM     Standing Status:   Future Number of Occurrences:   1     Standing Expiration Date:   9/25/2019    TSH 3RD GENERATION     Standing Status:   Future     Number of Occurrences:   1     Standing Expiration Date:   9/25/2019    PTT     Standing Status:   Future     Number of Occurrences:   1     Standing Expiration Date:   9/25/2019    PROTHROMBIN TIME + INR     Standing Status:   Future     Number of Occurrences:   1     Standing Expiration Date:   9/25/2019    LIPID PANEL     Standing Status:   Future     Number of Occurrences:   1     Standing Expiration Date:   9/25/2019    AMB POC EKG ROUTINE W/ 12 LEADS, INTER & REP     Order Specific Question:   Reason for Exam:     Answer:   HTN     Follow-up Disposition: Not on File  cardiovascular risk and specific lipid/LDL goals reviewed  use of aspirin to prevent MI and TIA's discussed. Patient  s/p PTCA for severe instent stenosis to LAD. Has abnormal lipid panel- will consider repatha if LDL remains high. She also c/o worsening sob even at moderate exertion associated with easy fatigue. Discussed about further w/u- willing to proceed to [facility] to evaluate CAD.   Continue DAPT for now

## 2018-09-27 ENCOUNTER — HOSPITAL ENCOUNTER (OUTPATIENT)
Age: 63
Setting detail: OUTPATIENT SURGERY
Discharge: HOME | End: 2018-09-27
Attending: INTERNAL MEDICINE | Admitting: INTERNAL MEDICINE
Payer: COMMERCIAL

## 2018-09-27 VITALS
SYSTOLIC BLOOD PRESSURE: 113 MMHG | RESPIRATION RATE: 16 BRPM | DIASTOLIC BLOOD PRESSURE: 71 MMHG | HEART RATE: 58 BPM | OXYGEN SATURATION: 98 % | HEIGHT: 66 IN | BODY MASS INDEX: 31.66 KG/M2 | WEIGHT: 197 LBS

## 2018-09-27 DIAGNOSIS — I24.9 ACS (ACUTE CORONARY SYNDROME) (HCC): ICD-10-CM

## 2018-09-27 LAB
END DIASTOLIC PRESSURE: 16
GLUCOSE BLD STRIP.AUTO-MCNC: 100 MG/DL (ref 70–110)
GLUCOSE BLD STRIP.AUTO-MCNC: 98 MG/DL (ref 70–110)

## 2018-09-27 PROCEDURE — 99153 MOD SED SAME PHYS/QHP EA: CPT | Performed by: INTERNAL MEDICINE

## 2018-09-27 PROCEDURE — C1894 INTRO/SHEATH, NON-LASER: HCPCS | Performed by: INTERNAL MEDICINE

## 2018-09-27 PROCEDURE — 77030013797 HC KT TRNSDUC PRSSR EDWD -A: Performed by: INTERNAL MEDICINE

## 2018-09-27 PROCEDURE — 74011250636 HC RX REV CODE- 250/636

## 2018-09-27 PROCEDURE — 74011250636 HC RX REV CODE- 250/636: Performed by: INTERNAL MEDICINE

## 2018-09-27 PROCEDURE — 77030016699 HC CATH ANGI DX INFN1 CARD -A: Performed by: INTERNAL MEDICINE

## 2018-09-27 PROCEDURE — 93458 L HRT ARTERY/VENTRICLE ANGIO: CPT | Performed by: INTERNAL MEDICINE

## 2018-09-27 PROCEDURE — 74011636320 HC RX REV CODE- 636/320: Performed by: INTERNAL MEDICINE

## 2018-09-27 PROCEDURE — 82962 GLUCOSE BLOOD TEST: CPT

## 2018-09-27 PROCEDURE — 99152 MOD SED SAME PHYS/QHP 5/>YRS: CPT | Performed by: INTERNAL MEDICINE

## 2018-09-27 RX ORDER — ICOSAPENT ETHYL 1000 MG/1
1 CAPSULE ORAL 2 TIMES DAILY WITH MEALS
Qty: 60 CAP | Refills: 6 | Status: SHIPPED | OUTPATIENT
Start: 2018-09-27 | End: 2019-10-06 | Stop reason: SDUPTHER

## 2018-09-27 RX ORDER — SODIUM CHLORIDE 0.9 % (FLUSH) 0.9 %
5-10 SYRINGE (ML) INJECTION AS NEEDED
Status: CANCELLED | OUTPATIENT
Start: 2018-09-27

## 2018-09-27 RX ORDER — FENTANYL CITRATE 50 UG/ML
INJECTION, SOLUTION INTRAMUSCULAR; INTRAVENOUS AS NEEDED
Status: DISCONTINUED | OUTPATIENT
Start: 2018-09-27 | End: 2018-09-27 | Stop reason: HOSPADM

## 2018-09-27 RX ORDER — LIDOCAINE HYDROCHLORIDE 10 MG/ML
INJECTION, SOLUTION EPIDURAL; INFILTRATION; INTRACAUDAL; PERINEURAL AS NEEDED
Status: DISCONTINUED | OUTPATIENT
Start: 2018-09-27 | End: 2018-09-27 | Stop reason: HOSPADM

## 2018-09-27 RX ORDER — MIDAZOLAM HYDROCHLORIDE 1 MG/ML
INJECTION, SOLUTION INTRAMUSCULAR; INTRAVENOUS AS NEEDED
Status: DISCONTINUED | OUTPATIENT
Start: 2018-09-27 | End: 2018-09-27 | Stop reason: HOSPADM

## 2018-09-27 RX ORDER — SODIUM CHLORIDE 0.9 % (FLUSH) 0.9 %
5-10 SYRINGE (ML) INJECTION EVERY 8 HOURS
Status: CANCELLED | OUTPATIENT
Start: 2018-09-27

## 2018-09-27 NOTE — PROGRESS NOTES
Cath holding summary Patient escorted to cath holding from waiting area ambulatory, alert and oriented x 4, voicing no complaints. Changed into gown and placed on monitor. NPO since MN. Lab results, med rec and H&P reviewed on chart. PIV x 2 inserted without difficulty. Accucheck:  99 mg/dl   Family to bedside.

## 2018-09-27 NOTE — Clinical Note
TRANSFER - IN REPORT:  
 
Verbal report received from: [address] Report consisted of patient's Situation, Background, Assessment and  
Recommendations(SBAR). Opportunity for questions and clarification was provided. Assessment completed upon patient's arrival to unit and care assumed.

## 2018-09-27 NOTE — IP AVS SNAPSHOT
Summary of Care Report The Summary of Care report has been created to help improve care coordination. Users with access to [email or web address] or [employer] (Web-based application) may access additional patient information including the Discharge Summary. If you are not currently a [employer] user and need more information, please call the number listed below in the [place] [address] section and ask to be connected with Medical Records. Facility Information Name Address Phone [address] [facility] [address] [phone] Patient Information Patient Name Sex  Jurgen Baltazar ([id number]) Female 1955 Discharge Information Admitting Provider Service Area Unit Elidia Oh MD / [facility] [place] [place] Cardiac Cath Lab / [phone] Discharge Provider Discharge Date/Time Discharge Disposition Destination (none) 2018 (Pending) AHR (none) Patient Language Language ENGLISH [13] Hospital Problems as of 2018  Reviewed: 10/9/2017  7:00 PM by Johnson Medina MD  
 None Non-Hospital Problems as of 2018  Reviewed: 10/9/2017  7:00 PM by Johnson Medina MD  
  
  
  
 Class Noted - Resolved Last Modified Active Problems Asthma (Chronic)  3/21/2014 - Present 10/9/2017 by Johnson Medina MD  
  Entered by Debbra Litten Hypertension (Chronic)  3/21/2014 - Present 10/10/2017 by Angeles Spicer NP Entered by Debbra Litten CAD (coronary artery disease)  3/21/2014 - Present 10/9/2017 by Johnson Medina MD  
  Entered by Debbra Litten Hyperlipidemia (Chronic)  3/21/2014 - Present 10/9/2017 by Johnson Medina MD  
  Entered by Debbra Litten  
  IC (interstitial cystitis)  2015 - Present 10/6/2015 by Tempie Doctor Entered by Emily Celeste Presence of stent in LAD coronary artery  8/17/2016 - Present 10/9/2017 by Mirna Trujillo MD  
  Entered by Lauren Barney MD  
  Acute coronary syndrome ([facility] [address]  8/17/2016 - Present 8/17/2016 by Lauren Barney MD  
  Entered by Lauren Barney MD  
  ACS (acute coronary syndrome) ([facility] [address]  8/17/2016 - Present 8/17/2016 by Lefty Aguilar Entered by [employer] LV dysfunction  8/30/2016 - Present 8/30/2016 by Lauren Barney MD  
  Entered by Lauren Barney MD  
  Atypical chest pain  10/9/2017 - Present 10/10/2017 by Lillian Parish NP Entered by Therese Christy DO Coronary artery disease of bypass graft of native heart with stable angina pectoris ([facility] [address]  9/24/2018 - Present 9/24/2018 by Lauren Barney MD  
  Entered by Lauren Barney MD  
  Overview Signed 9/24/2018 10:46 AM by Lauren Barney MD  
   Added automatically from request for surgery [id number] You are allergic to the following Allergen Reactions Latex, Natural Rubber Rash Adhesive Rash Macrobid (Nitrofurantoin Monohyd/M-Cryst) Nausea and Vomiting Current Discharge Medication List  
  
START taking these medications Dose & Instructions Dispensing Information Comments  
 icosapent ethyl 1 gram capsule Commonly known as:  VASCEPA Dose:  1 Cap Take 1 Cap by mouth two (2) times daily (with meals). Quantity:  60 Cap Refills:  6 CONTINUE these medications which have NOT CHANGED Dose & Instructions Dispensing Information Comments  
 amLODIPine 5 mg tablet Commonly known as:  Sage Chafe Dose:  5 mg Take 1 Tab by mouth daily. Quantity:  90 Tab Refills:  1  
   
 aspirin 81 mg chewable tablet Dose:  81 mg Take 81 mg by mouth daily. Refills:  0  
   
 clopidogrel 75 mg Tab Commonly known as:  PLAVIX Dose:  75 mg Take 1 Tab by mouth daily. Quantity:  90 Tab Refills:  3 fenofibrate nanocrystallized 48 mg tablet Commonly known as:  [employer] Dose:  48 mg Take 1 Tab by mouth daily. Quantity:  30 Tab Refills:  6  
   
 metoprolol succinate 50 mg XL tablet Commonly known as:  TOPROL XL Dose:  50 mg Take 1 Tab by mouth daily. Quantity:  90 Tab Refills:  1 NITROSTAT 0.4 mg SL tablet Generic drug:  nitroglycerin  
 by SubLINGual route every five (5) minutes as needed for Chest Pain. Refills:  0  
   
 ondansetron 4 mg disintegrating tablet Commonly known as:  ZOFRAN ODT Dose:  4 mg Take 1 Tab by mouth every eight (8) hours as needed for Nausea. Quantity:  14 Tab Refills:  0 PROAIR HFA 90 mcg/actuation inhaler Generic drug:  albuterol Take  by inhalation. Refills:  0 PYRIDIUM 100 mg tablet Generic drug:  phenazopyridine Dose:  100 mg Take 100 mg by mouth as needed. Refills:  0  
   
 simvastatin 40 mg tablet Commonly known as:  ZOCOR  
 TAKE 1 TAB BY MOUTH NIGHTLY. Quantity:  30 Tab Refills:  3  
   
 traZODone 100 mg tablet Commonly known as:  Silver Tony Dose:  100 mg Take 100 mg by mouth as needed. Refills:  0 STOP taking these medications Comments  
 omega 3-DHA-EPA-fish oil 1,000 mg (120 mg-180 mg) capsule Surgery Information ID Date/Time Status Primary Surgeon All Procedures Location [id number] 9/27/2018 1030 Complete Duncan Nogueira MD Left Heart Cath Coronary Angiography Left Ventriculography [facility] CARDIAC CATH LAB Follow-up Information Follow up With Details Comments Contact Info None   None (395) Patient stated that they have no PCP Discharge Instructions Cardiac Catheterization/Angiography Discharge Instructions *Check the puncture site frequently for swelling or bleeding.  If you see any bleeding, lie down and apply pressure over the area with a clean town or washcloth. Notify your doctor for any redness, swelling, drainage or oozing from the puncture site. Notify your doctor for any fever or chills. *If the leg or arm with the puncture becomes cold, numb or painful, call Dr Huma Clemente *Activity should be limited for the next 48 hours. Climb stairs as little as possible and avoid any stooping, bending or strenuous activity for 48 hours. No heavy lifting (anything over 10 pounds) for three days. *Do not drive for 48 hours. *You may resume your usual diet. Drink more fluids than usual. 
 
*Have a responsible person drive you home and stay with you for at least 24 hours after your heart catheterization/angiography. *You may remove the bandage from your Right and Groin in 24 hours. You may shower in 24 hours. No tub baths, hot tubs or swimming for one week. Do not place any lotions, creams, powders, ointments over the puncture site for one week. You may place a clean band-aid over the puncture site each day for 5 days. Change this daily. DISCHARGE SUMMARY from Nurse PATIENT INSTRUCTIONS: 
 
After general anesthesia or intravenous sedation, for 24 hours or while taking prescription Narcotics: · Limit your activities · Do not drive and operate hazardous machinery · Do not make important personal or business decisions · Do  not drink alcoholic beverages · If you have not urinated within 8 hours after discharge, please contact your surgeon on call. Report the following to your surgeon: 
· Excessive pain, swelling, redness or odor of or around the surgical area · Temperature over 100.5 · Nausea and vomiting lasting longer than 4 hours or if unable to take medications · Any signs of decreased circulation or nerve impairment to extremity: change in color, persistent  numbness, tingling, coldness or increase pain · Any questions What to do at Home: These are general instructions for a healthy lifestyle: No smoking/ No tobacco products/ Avoid exposure to second hand smoke Surgeon General's Warning:  Quitting smoking now greatly reduces serious risk to your health. Obesity, smoking, and sedentary lifestyle greatly increases your risk for illness A healthy diet, regular physical exercise & weight monitoring are important for maintaining a healthy lifestyle You may be retaining fluid if you have a history of heart failure or if you experience any of the following symptoms:  Weight gain of 3 pounds or more overnight or 5 pounds in a week, increased swelling in our hands or feet or shortness of breath while lying flat in bed. Please call your doctor as soon as you notice any of these symptoms; do not wait until your next office visit. Recognize signs and symptoms of STROKE: 
 
F-face looks uneven A-arms unable to move or move unevenly S-speech slurred or non-existent T-time-call 911 as soon as signs and symptoms begin-DO NOT go Back to bed or wait to see if you get better-TIME IS BRAIN. Warning Signs of HEART ATTACK Call 911 if you have these symptoms: 
? Chest discomfort. Most heart attacks involve discomfort in the center of the chest that lasts more than a few minutes, or that goes away and comes back. It can feel like uncomfortable pressure, squeezing, fullness, or pain. ? Discomfort in other areas of the upper body. Symptoms can include pain or discomfort in one or both arms, the back, neck, jaw, or stomach. ? Shortness of breath with or without chest discomfort. ? Other signs may include breaking out in a cold sweat, nausea, or lightheadedness. Don't wait more than five minutes to call [address]! Fast action can save your life. Calling 911 is almost always the fastest way to get lifesaving treatment. Emergency Medical Services staff can begin treatment when they arrive  up to an hour sooner than if someone gets to the hospital by car. The discharge information has been reviewed with the patient. The patient verbalized understanding. Discharge medications reviewed with the patient and appropriate educational materials and side effects teaching were provided. ___________________________________________________________________________________________________________________________________ Chart Review Routing History Recipient Method Report Sent By Nick Garcia MD  
[facility] [address] [place], [place] [address]courtney [address] 983 [facility] Phone: [phone] Mail IP Auto Routed Nessa, [address] [place] [address] [[id number]] 8/24/2016  5:47 PM 08/24/2016 Fco Garcia MD  
[address] [facility] Phone: [phone] Mail IP Auto Routed Nessa, [address] [place] [address] [[id number]] 8/26/2016  7:06 AM 08/26/2016 Althea Odom MD  
Phone: [phone] In Basket IP Auto Routed Dali Goldman MD [[id number]] 10/11/2017  8:30 AM 10/11/2017 Dru Kinney MD  
Phone: [phone] In Basket IP Auto Routed Dali Goldman MD [[id number]] 10/11/2017  8:30 AM 10/11/2017

## 2018-09-27 NOTE — H&P
H&P update No new symptoms Risks, benefits and alternatives of LHC/ptca/stent explained to patient. All questions answered

## 2018-09-27 NOTE — Clinical Note
TRANSFER - OUT REPORT:  
 
Verbal report given to: [facility] RN Paramjit Crawford. Report consisted of patient's Situation, Background, Assessment and  
Recommendations(SBAR). Opportunity for questions and clarification was provided. Patient transported to: [address].

## 2018-09-27 NOTE — IP AVS SNAPSHOT
Cheng Ayala 
 
 
 [address] Patient: Latrell Pino MRN: [id number] GBD:0/27/1142 About your hospitalization You were admitted on:  September 27, 2018 You last received care in the:  [facility] CATH LAB You were discharged on:  September 27, 2018 Why you were hospitalized Your primary diagnosis was:  Not on File Follow-up Information Follow up With Details Comments Contact Info None   None ([phone]) Patient stated that they have no PCP Discharge Orders None A check shakila indicates which time of day the medication should be taken. My Medications START taking these medications Instructions Each Dose to Equal  
 Morning Noon Evening Bedtime  
 icosapent ethyl 1 gram capsule Commonly known as:  VASCEPA Your last dose was: Your next dose is: Take 1 Cap by mouth two (2) times daily (with meals). 1 Cap CONTINUE taking these medications Instructions Each Dose to Equal  
 Morning Noon Evening Bedtime  
 amLODIPine 5 mg tablet Commonly known as:  Tad Raine Your last dose was: Your next dose is: Take 1 Tab by mouth daily. 5 mg  
    
   
   
   
  
 aspirin 81 mg chewable tablet Your last dose was: Your next dose is: Take 81 mg by mouth daily. 81 mg  
    
   
   
   
  
 clopidogrel 75 mg Tab Commonly known as:  PLAVIX Your last dose was: Your next dose is: Take 1 Tab by mouth daily. 75 mg  
    
   
   
   
  
 fenofibrate nanocrystallized 48 mg tablet Commonly known as:  [employer] Your last dose was: Your next dose is: Take 1 Tab by mouth daily. 48 mg  
    
   
   
   
  
 metoprolol succinate 50 mg XL tablet Commonly known as:  TOPROL XL Your last dose was: Your next dose is: Take 1 Tab by mouth daily. 50 mg NITROSTAT 0.4 mg SL tablet Generic drug:  nitroglycerin Your last dose was: Your next dose is:    
   
   
 by SubLINGual route every five (5) minutes as needed for Chest Pain. ondansetron 4 mg disintegrating tablet Commonly known as:  ZOFRAN ODT Your last dose was: Your next dose is: Take 1 Tab by mouth every eight (8) hours as needed for Nausea. 4 mg PROAIR HFA 90 mcg/actuation inhaler Generic drug:  albuterol Your last dose was: Your next dose is: Take  by inhalation. PYRIDIUM 100 mg tablet Generic drug:  phenazopyridine Your last dose was: Your next dose is: Take 100 mg by mouth as needed. 100 mg  
    
   
   
   
  
 simvastatin 40 mg tablet Commonly known as:  ZOCOR Your last dose was: Your next dose is: TAKE 1 TAB BY MOUTH NIGHTLY.  
     
   
   
   
  
 traZODone 100 mg tablet Commonly known as:  Oletta Ruth Ann Your last dose was: Your next dose is: Take 100 mg by mouth as needed. 100 mg  
    
   
   
   
  
  
STOP taking these medications   
 omega 3-DHA-EPA-fish oil 1,000 mg (120 mg-180 mg) capsule Where to Get Your Medications Information on where to get these meds will be given to you by the nurse or doctor. ! Ask your nurse or doctor about these medications  
  icosapent ethyl 1 gram capsule Discharge Instructions Cardiac Catheterization/Angiography Discharge Instructions *Check the puncture site frequently for swelling or bleeding. If you see any bleeding, lie down and apply pressure over the area with a clean town or washcloth.  Notify your doctor for any redness, swelling, drainage or oozing from the puncture site. Notify your doctor for any fever or chills. *If the leg or arm with the puncture becomes cold, numb or painful, call Dr Sejal Joseph *Activity should be limited for the next 48 hours. Climb stairs as little as possible and avoid any stooping, bending or strenuous activity for 48 hours. No heavy lifting (anything over 10 pounds) for three days. *Do not drive for 48 hours. *You may resume your usual diet. Drink more fluids than usual. 
 
*Have a responsible person drive you home and stay with you for at least 24 hours after your heart catheterization/angiography. *You may remove the bandage from your Right and Groin in 24 hours. You may shower in 24 hours. No tub baths, hot tubs or swimming for one week. Do not place any lotions, creams, powders, ointments over the puncture site for one week. You may place a clean band-aid over the puncture site each day for 5 days. Change this daily. DISCHARGE SUMMARY from Nurse PATIENT INSTRUCTIONS: 
 
After general anesthesia or intravenous sedation, for 24 hours or while taking prescription Narcotics: · Limit your activities · Do not drive and operate hazardous machinery · Do not make important personal or business decisions · Do  not drink alcoholic beverages · If you have not urinated within 8 hours after discharge, please contact your surgeon on call. Report the following to your surgeon: 
· Excessive pain, swelling, redness or odor of or around the surgical area · Temperature over 100.5 · Nausea and vomiting lasting longer than 4 hours or if unable to take medications · Any signs of decreased circulation or nerve impairment to extremity: change in color, persistent  numbness, tingling, coldness or increase pain · Any questions What to do at Home: These are general instructions for a healthy lifestyle: No smoking/ No tobacco products/ Avoid exposure to second hand smoke Surgeon General's Warning:  Quitting smoking now greatly reduces serious risk to your health. Obesity, smoking, and sedentary lifestyle greatly increases your risk for illness A healthy diet, regular physical exercise & weight monitoring are important for maintaining a healthy lifestyle You may be retaining fluid if you have a history of heart failure or if you experience any of the following symptoms:  Weight gain of 3 pounds or more overnight or 5 pounds in a week, increased swelling in our hands or feet or shortness of breath while lying flat in bed. Please call your doctor as soon as you notice any of these symptoms; do not wait until your next office visit. Recognize signs and symptoms of STROKE: 
 
F-face looks uneven A-arms unable to move or move unevenly S-speech slurred or non-existent T-time-call 911 as soon as signs and symptoms begin-DO NOT go Back to bed or wait to see if you get better-TIME IS BRAIN. Warning Signs of HEART ATTACK Call 911 if you have these symptoms: 
? Chest discomfort. Most heart attacks involve discomfort in the center of the chest that lasts more than a few minutes, or that goes away and comes back. It can feel like uncomfortable pressure, squeezing, fullness, or pain. ? Discomfort in other areas of the upper body. Symptoms can include pain or discomfort in one or both arms, the back, neck, jaw, or stomach. ? Shortness of breath with or without chest discomfort. ? Other signs may include breaking out in a cold sweat, nausea, or lightheadedness. Don't wait more than five minutes to call [address]! Fast action can save your life. Calling 911 is almost always the fastest way to get lifesaving treatment. Emergency Medical Services staff can begin treatment when they arrive  up to an hour sooner than if someone gets to the hospital by car. The discharge information has been reviewed with the patient.   The patient verbalized understanding. Discharge medications reviewed with the patient and appropriate educational materials and side effects teaching were provided. ___________________________________________________________________________________________________________________________________ Introducing [facility] HE[facility]! [employer] introduces [employer] patient portal. Now you can access parts of your medical record, email your doctor's office, and request medication refills online. 1. In your internet browser, go to [email or web address] 2. Click on the First Time User? Click Here link in the Sign In box. You will see the New Member Sign Up page. 3. Enter your [employer] Access Code exactly as it appears below. You will not need to use this code after youve completed the sign-up process. If you do not sign up before the expiration date, you must request a new code. · [employer] Access Code: [email or web address] Expires: 11/30/2018  5:22 AM 
 
4. Enter the last four digits of your Social Security Number (xxxx) and Date of Birth (mm/dd/yyyy) as indicated and click Submit. You will be taken to the next sign-up page. 5. Create a [employer] ID. This will be your [employer] login ID and cannot be changed, so think of one that is secure and easy to remember. 6. Create a [employer] password. You can change your password at any time. 7. Enter your Password Reset Question and Answer. This can be used at a later time if you forget your password. 8. Enter your e-mail address. You will receive e-mail notification when new information is available in [address] 9. Click Sign Up. You can now view and download portions of your medical record. 10. Click the Download Summary menu link to download a portable copy of your medical information. If you have questions, please visit the Frequently Asked Questions section of the [employer] website.  Remember, [employer] is NOT to be used for urgent needs. For medical emergencies, dial 911. Now available from your iPhone and Android! Introducing Will Magallanes As a [facility] patient, I wanted to make you aware of our electronic visit tool called Will Magallanes. [facility] 24/7 allows you to connect within minutes with a medical provider 24 hours a day, seven days a week via a mobile device or tablet or logging into a secure website from your computer. You can access Will Magallanes from anywhere in the [place]. A virtual visit might be right for you when you have a simple condition and feel like you just dont want to get out of bed, or cant get away from work for an appointment, when your regular [facility] provider is not available (evenings, weekends or holidays), or when youre out of town and need minor care. Electronic visits cost only $49 and if the [facility] 24/7 provider determines a prescription is needed to treat your condition, one can be electronically transmitted to a nearby pharmacy*. Please take a moment to enroll today if you have not already done so. The enrollment process is free and takes just a few minutes. To enroll, please download the [facility] 24/7 oswaldo to your tablet or phone, or visit [email or web address] to enroll on your computer. And, as an [address] [place] [address] patient with a [employer] account, the results of your visits will be scanned into your electronic medical record and your primary care provider will be able to view the scanned results. We urge you to continue to see your regular [facility] provider for your ongoing medical care. And while your primary care provider may not be the one available when you seek a Will Magallanes virtual visit, the peace of mind you get from getting a real diagnosis real time can be priceless. For more information on Will Magallanes, view our Frequently Asked Questions (FAQs) at [email or web address].  
 
Sincerely, 
 
 Santos Sampson MD 
Chief Medical Officer [address] *:  certain medications cannot be prescribed via Will Magallanes Providers Seen During Your Hospitalization Provider Specialty Primary office phone Minus MD [facility] [phone] Your Primary Care Physician (PCP) Primary Care Physician Office Phone Office Fax NONE ** None ** ** None ** You are allergic to the following Allergen Reactions Latex, Natural Rubber Rash Adhesive Rash Macrobid (Nitrofurantoin Monohyd/M-Cryst) Nausea and Vomiting Recent Documentation Height Weight Breastfeeding? BMI OB Status Smoking Status 1.676 m 89.4 kg No 31.8 kg/m2 Menopause Never Smoker Emergency Contacts Name Discharge Info Relation Home Work Mobile [address] CAREGIVER [3] Spouse [3] [phone] Dr Kellee Thomas [3] [phone] Dr Denis  Spouse [3] [phone] Patient Belongings The following personal items are in your possession at time of discharge: 
     Visual Aid: None Please provide this summary of care documentation to your next provider. Signatures-by signing, you are acknowledging that this After Visit Summary has been reviewed with you and you have received a copy. Patient Signature:  ____________________________________________________________ Date:  ____________________________________________________________  
  
Olman Sport Provider Signature:  ____________________________________________________________ Date:  ____________________________________________________________

## 2018-09-27 NOTE — DISCHARGE INSTRUCTIONS
Cardiac Catheterization/Angiography Discharge Instructions    *Check the puncture site frequently for swelling or bleeding. If you see any bleeding, lie down and apply pressure over the area with a clean town or washcloth. Notify your doctor for any redness, swelling, drainage or oozing from the puncture site. Notify your doctor for any fever or chills. *If the leg or arm with the puncture becomes cold, numb or painful, call Dr Renetta Godfrey    *Activity should be limited for the next 48 hours. Climb stairs as little as possible and avoid any stooping, bending or strenuous activity for 48 hours. No heavy lifting (anything over 10 pounds) for three days. *Do not drive for 48 hours. *You may resume your usual diet. Drink more fluids than usual.    *Have a responsible person drive you home and stay with you for at least 24 hours after your heart catheterization/angiography. *You may remove the bandage from your Right and Groin in 24 hours. You may shower in 24 hours. No tub baths, hot tubs or swimming for one week. Do not place any lotions, creams, powders, ointments over the puncture site for one week. You may place a clean band-aid over the puncture site each day for 5 days. Change this daily. DISCHARGE SUMMARY from Nurse    PATIENT INSTRUCTIONS:    After general anesthesia or intravenous sedation, for 24 hours or while taking prescription Narcotics:  · Limit your activities  · Do not drive and operate hazardous machinery  · Do not make important personal or business decisions  · Do  not drink alcoholic beverages  · If you have not urinated within 8 hours after discharge, please contact your surgeon on call.     Report the following to your surgeon:  · Excessive pain, swelling, redness or odor of or around the surgical area  · Temperature over 100.5  · Nausea and vomiting lasting longer than 4 hours or if unable to take medications  · Any signs of decreased circulation or nerve impairment to extremity: change in color, persistent  numbness, tingling, coldness or increase pain  · Any questions    What to do at Home:    These are general instructions for a healthy lifestyle:    No smoking/ No tobacco products/ Avoid exposure to second hand smoke  Surgeon General's Warning:  Quitting smoking now greatly reduces serious risk to your health. Obesity, smoking, and sedentary lifestyle greatly increases your risk for illness    A healthy diet, regular physical exercise & weight monitoring are important for maintaining a healthy lifestyle    You may be retaining fluid if you have a history of heart failure or if you experience any of the following symptoms:  Weight gain of 3 pounds or more overnight or 5 pounds in a week, increased swelling in our hands or feet or shortness of breath while lying flat in bed. Please call your doctor as soon as you notice any of these symptoms; do not wait until your next office visit. Recognize signs and symptoms of STROKE:    F-face looks uneven    A-arms unable to move or move unevenly    S-speech slurred or non-existent    T-time-call 911 as soon as signs and symptoms begin-DO NOT go       Back to bed or wait to see if you get better-TIME IS BRAIN. Warning Signs of HEART ATTACK     Call 911 if you have these symptoms:   Chest discomfort. Most heart attacks involve discomfort in the center of the chest that lasts more than a few minutes, or that goes away and comes back. It can feel like uncomfortable pressure, squeezing, fullness, or pain.  Discomfort in other areas of the upper body. Symptoms can include pain or discomfort in one or both arms, the back, neck, jaw, or stomach.  Shortness of breath with or without chest discomfort.  Other signs may include breaking out in a cold sweat, nausea, or lightheadedness. Don't wait more than five minutes to call 911 - MINUTES MATTER! Fast action can save your life.  Calling 911 is almost always the fastest way to get lifesaving treatment. Emergency Medical Services staff can begin treatment when they arrive -- up to an hour sooner than if someone gets to the hospital by car. The discharge information has been reviewed with the patient. The patient verbalized understanding. Discharge medications reviewed with the patient and appropriate educational materials and side effects teaching were provided.   ___________________________________________________________________________________________________________________________________

## 2018-10-09 RX ORDER — METOPROLOL SUCCINATE 50 MG/1
TABLET, EXTENDED RELEASE ORAL
Qty: 90 TAB | Refills: 1 | Status: SHIPPED | OUTPATIENT
Start: 2018-10-09 | End: 2019-05-01 | Stop reason: SDUPTHER

## 2018-12-03 RX ORDER — CLOPIDOGREL BISULFATE 75 MG/1
TABLET ORAL
Qty: 90 TAB | Refills: 3 | Status: SHIPPED | OUTPATIENT
Start: 2018-12-03 | End: 2019-12-05 | Stop reason: SDUPTHER

## 2018-12-21 RX ORDER — AMLODIPINE BESYLATE 5 MG/1
5 TABLET ORAL DAILY
Qty: 90 TAB | Refills: 1 | Status: SHIPPED | OUTPATIENT
Start: 2018-12-21 | End: 2019-06-16 | Stop reason: SDUPTHER

## 2018-12-31 RX ORDER — SIMVASTATIN 40 MG/1
TABLET, FILM COATED ORAL
Qty: 30 TAB | Refills: 3 | Status: SHIPPED | OUTPATIENT
Start: 2018-12-31 | End: 2019-05-01 | Stop reason: SDUPTHER

## 2019-01-30 ENCOUNTER — OFFICE VISIT (OUTPATIENT)
Dept: CARDIOLOGY CLINIC | Age: 64
End: 2019-01-30

## 2019-01-30 VITALS
DIASTOLIC BLOOD PRESSURE: 78 MMHG | HEIGHT: 66 IN | BODY MASS INDEX: 31.8 KG/M2 | SYSTOLIC BLOOD PRESSURE: 165 MMHG | HEART RATE: 62 BPM

## 2019-01-30 DIAGNOSIS — R06.02 SOB (SHORTNESS OF BREATH): ICD-10-CM

## 2019-01-30 DIAGNOSIS — I25.118 CORONARY ARTERY DISEASE OF NATIVE ARTERY OF NATIVE HEART WITH STABLE ANGINA PECTORIS (HCC): Primary | ICD-10-CM

## 2019-01-30 DIAGNOSIS — J45.20 MILD INTERMITTENT ASTHMA, UNSPECIFIED WHETHER COMPLICATED: ICD-10-CM

## 2019-01-30 DIAGNOSIS — Z95.5 PRESENCE OF STENT IN LAD CORONARY ARTERY: ICD-10-CM

## 2019-01-30 DIAGNOSIS — I10 HYPERTENSION, UNSPECIFIED TYPE: ICD-10-CM

## 2019-01-30 DIAGNOSIS — E78.5 HYPERLIPIDEMIA, UNSPECIFIED HYPERLIPIDEMIA TYPE: ICD-10-CM

## 2019-01-30 NOTE — PROGRESS NOTES
1. Have you been to the ER, urgent care clinic since your last visit? Hospitalized since your last visit? Yes Where: [facility] 2. Have you seen or consulted any other health care providers outside of the [address] [place] [address] since your last visit? Include any pap smears or colon screening. No  
 
3. Since your last visit, have you had any of the following symptoms? .  
 
   
 
4. Have you had any blood work, X-rays or cardiac testing? Yes Where: [facility] Reason for visit: Chest Xray/Labs 5. Where do you normally have your labs drawn? [facility] 6. Do you need any refills today?    NO

## 2019-01-30 NOTE — LETTER
Yeny Rogers 1955 1/30/2019 Dear Colleen Jaquez MD 
 
I had the pleasure of evaluating  Ms. Dianne Salcedo in office today. Below are the relevant portions of my assessment and plan of care. ICD-10-CM ICD-9-CM 1. Coronary artery disease of native artery of native heart with stable angina pectoris ([address] I25.118 414.01   
  413.9 2. Hypertension, unspecified type I10 401.9 3. Hyperlipidemia, unspecified hyperlipidemia type E78.5 272.4 4. Presence of stent in LAD coronary artery Z95.5 V45.82   
5. Mild intermittent asthma, unspecified whether complicated D55.60 477.37 REFERRAL TO PULMONARY DISEASE 6. SOB (shortness of breath) R06.02 786.05 ECHO ADULT COMPLETE Current Outpatient Medications Medication Sig Dispense Refill  simvastatin (ZOCOR) 40 mg tablet TAKE 1 TAB BY MOUTH NIGHTLY. 30 Tab 3  clopidogrel (PLAVIX) 75 mg tab TAKE 1 TABLET BY MOUTH EVERY DAY 90 Tab 3  
 metoprolol succinate (TOPROL-XL) 50 mg XL tablet TAKE 1 TAB BY MOUTH DAILY. 90 Tab 1  
 fenofibrate nanocrystallized (TRICOR) 48 mg tablet Take 1 Tab by mouth daily. 30 Tab 6  
 ondansetron (ZOFRAN ODT) 4 mg disintegrating tablet Take 1 Tab by mouth every eight (8) hours as needed for Nausea. 14 Tab 0  
 traZODone (DESYREL) 100 mg tablet Take 100 mg by mouth as needed.  nitroglycerin (NITROSTAT) 0.4 mg SL tablet by SubLINGual route every five (5) minutes as needed for Chest Pain.  phenazopyridine (PYRIDIUM) 100 mg tablet Take 100 mg by mouth as needed.  aspirin 81 mg chewable tablet Take 81 mg by mouth daily.  albuterol (PROAIR HFA) 90 mcg/actuation inhaler Take  by inhalation.  amLODIPine (NORVASC) 5 mg tablet TAKE 1 TAB BY MOUTH DAILY. 90 Tab 1  
 icosapent ethyl (VASCEPA) 1 gram capsule Take 1 Cap by mouth two (2) times daily (with meals). 60 Cap 6 Orders Placed This Encounter  REFERRAL TO PULMONARY DISEASE Referral Priority:   Routine Referral Type:   Consultation Referral Reason:   Specialty Services Required Referred to Provider:   Js Mtz MD  
  Requested Specialty:   Pulmonary Disease Number of Visits Requested:   1 If you have questions, please do not hesitate to call me. I look forward to following Ms. Win along with you.  
 
Sincerely, 
Vandana Townsend MD

## 2019-01-30 NOTE — PROGRESS NOTES
HISTORY OF PRESENT ILLNESS Michelle Trevino is a 58 y.o. female. Hypertension The history is provided by the patient. This is a chronic problem. The current episode started more than 1 week ago. Associated symptoms include shortness of breath. Pertinent negatives include no chest pain. Shortness of Breath The history is provided by the patient. This is a chronic problem. The problem occurs frequently. The current episode started more than 1 week ago. The problem has been gradually worsening. Pertinent negatives include no ear pain, no neck pain, no wheezing, no chest pain, no syncope, no rash and no leg swelling. She has had prior hospitalizations. She has had prior ED visits. Associated medical issues include CAD. Associated medical issues do not include heart failure. Review of Systems Constitutional: Negative for chills and weight loss. HENT: Negative for congestion, ear discharge, ear pain, hearing loss, nosebleeds and tinnitus. Eyes: Negative for blurred vision. Respiratory: Positive for shortness of breath. Negative for wheezing and stridor. Cardiovascular: Negative for chest pain, leg swelling and syncope. Gastrointestinal: Negative for heartburn. Musculoskeletal: Negative for myalgias and neck pain. Skin: Negative for itching and rash. Neurological: Negative for tingling, tremors, focal weakness and loss of consciousness. Psychiatric/Behavioral: Negative for depression and suicidal ideas. Family History Problem Relation Age of Onset  Cancer Mother  Heart Disease Father  Hypertension Father  Elevated Lipids Father  Diabetes Neg Hx  Stroke Neg Hx Past Medical History:  
Diagnosis Date  CAD (coronary artery disease)  HTN (hypertension)  Hyperlipidemia  IC (interstitial cystitis)  MI (myocardial infarction) ([facility] [address] Past Surgical History:  
Procedure Laterality Date  CARDIAC SURG PROCEDURE UNLIST  HX APPENDECTOMY  HX CHOLECYSTECTOMY  HX CORONARY STENT PLACEMENT  2007  HX CORONARY STENT PLACEMENT    
 HX HYSTERECTOMY  HX TUBAL LIGATION Bilateral   
 
 
Social History Tobacco Use  Smoking status: Never Smoker  Smokeless tobacco: Never Used Substance Use Topics  Alcohol use: Yes Comment: socially Allergies Allergen Reactions  Latex, Natural Rubber Rash  Adhesive Rash  Macrobid [Nitrofurantoin Monohyd/M-Cryst] Nausea and Vomiting Outpatient Medications Marked as Taking for the 1/30/19 encounter (Office Visit) with Moo Alexis MD  
Medication Sig Dispense Refill  simvastatin (ZOCOR) 40 mg tablet TAKE 1 TAB BY MOUTH NIGHTLY. 30 Tab 3  clopidogrel (PLAVIX) 75 mg tab TAKE 1 TABLET BY MOUTH EVERY DAY 90 Tab 3  
 metoprolol succinate (TOPROL-XL) 50 mg XL tablet TAKE 1 TAB BY MOUTH DAILY. 90 Tab 1  
 fenofibrate nanocrystallized (TRICOR) 48 mg tablet Take 1 Tab by mouth daily. 30 Tab 6  
 ondansetron (ZOFRAN ODT) 4 mg disintegrating tablet Take 1 Tab by mouth every eight (8) hours as needed for Nausea. 14 Tab 0  
 traZODone (DESYREL) 100 mg tablet Take 100 mg by mouth as needed.  nitroglycerin (NITROSTAT) 0.4 mg SL tablet by SubLINGual route every five (5) minutes as needed for Chest Pain.  phenazopyridine (PYRIDIUM) 100 mg tablet Take 100 mg by mouth as needed.  aspirin 81 mg chewable tablet Take 81 mg by mouth daily.  albuterol (PROAIR HFA) 90 mcg/actuation inhaler Take  by inhalation. Visit Vitals /78 Pulse 62 Ht 5' 6\" (1.676 m) BMI 31.80 kg/m² Physical Exam  
Constitutional: She is oriented to person, place, and time. She appears well-developed and well-nourished. No distress. HENT:  
Head: Atraumatic. Mouth/Throat: No oropharyngeal exudate. Eyes: Conjunctivae are normal. Right eye exhibits no discharge. Left eye exhibits no discharge. No scleral icterus. Neck: Normal range of motion. Neck supple. No JVD present. No tracheal deviation present. No thyromegaly present. Cardiovascular: Normal rate, regular rhythm and normal heart sounds. Exam reveals no gallop. No murmur heard. Pulmonary/Chest: Effort normal and breath sounds normal. No stridor. No respiratory distress. She has no wheezes. She has no rales. She exhibits no tenderness. Abdominal: Soft. There is no tenderness. There is no rebound. Musculoskeletal: Normal range of motion. She exhibits no edema or tenderness. Lymphadenopathy:  
  She has no cervical adenopathy. Neurological: She is alert and oriented to person, place, and time. She exhibits normal muscle tone. Skin: Skin is warm. She is not diaphoretic. Psychiatric: She has a normal mood and affect. Her behavior is normal.  
 
ekg sinus rhythm with t wave inversion in anterior and inferior leads. [facility] 
CONCLUSION: 
1. Normal perfusion scan. 2. Normal wall motion and ejection fraction. ASSESSMENT and PLAN 
  ICD-10-CM ICD-9-CM 1. Coronary artery disease of native artery of native heart with stable angina pectoris ([facility] [address] I25.118 414.01   
  413.9 2. Hypertension, unspecified type I10 401.9 3. Hyperlipidemia, unspecified hyperlipidemia type E78.5 272.4 4. Presence of stent in LAD coronary artery Z95.5 V45.82   
5. Mild intermittent asthma, unspecified whether complicated Z12.61 943.93 REFERRAL TO PULMONARY DISEASE 6. SOB (shortness of breath) R06.02 786.05 ECHO ADULT COMPLETE Orders Placed This Encounter  REFERRAL TO PULMONARY DISEASE Referral Priority:   Routine Referral Type:   Consultation Referral Reason:   Specialty Services Required Referred to Provider:   Deann Galeano MD  
  Requested Specialty:   Pulmonary Disease Number of Visits Requested:   1 Follow-up Disposition: 
Return in about 1 month (around 2/28/2019). cardiovascular risk and specific lipid/LDL goals reviewed use of aspirin to prevent MI and TIA's discussed. Patient with history of CAD status post PCI to mid LAD followed by recent PTCA for in-stent stenosis. She is here for follow-up complaints of shortness of breath and worsening cough. [facility]-not consistent with CHF. She has history of bronchial asthma and will make an appointment with Dr. Ami Hale for further evaluation. She is advised to return in 1 month with echocardiogram to assess LV function.

## 2019-01-30 NOTE — PATIENT INSTRUCTIONS
Learning About Coronary Artery Disease (CAD) What is coronary artery disease? Coronary artery disease (CAD) occurs when plaque builds up in the arteries that bring oxygen-rich blood to your heart. Plaque is a fatty substance made of cholesterol, calcium, and other substances in the blood. This process is called hardening of the arteries, or atherosclerosis. What happens when you have coronary artery disease? · Plaque may narrow the coronary arteries. Narrowed arteries cause poor blood flow. This can lead to angina symptoms such as chest pain or discomfort. If blood flow is completely blocked, you could have a heart attack. · You can slow CAD and reduce the risk of future problems by making changes in your lifestyle. These include quitting smoking and eating heart-healthy foods. · Treatments for CAD, along with changes in your lifestyle, can help you live a longer and healthier life. How can you prevent coronary artery disease? · Do not smoke. It may be the best thing you can do to prevent heart disease. If you need help quitting, talk to your doctor about stop-smoking programs and medicines. These can increase your chances of quitting for good. · Be active. Get at least 30 minutes of exercise on most days of the week. Walking is a good choice. You also may want to do other activities, such as running, swimming, cycling, or playing tennis or team sports. · Eat heart-healthy foods. Eat more fruits and vegetables and less foods that contain saturated and trans fats. Limit alcohol, sodium, and sweets. · Stay at a healthy weight. Lose weight if you need to. · Manage other health problems such as diabetes, high blood pressure, and high cholesterol. · Manage stress. Stress can hurt your heart. To keep stress low, talk about your problems and feelings. Don't keep your feelings hidden.  
· If you have talked about it with your doctor, take a low-dose aspirin every day. Aspirin can help certain people lower their risk of a heart attack or stroke. But taking aspirin isn't right for everyone, because it can cause serious bleeding. Do not start taking daily aspirin unless your doctor knows about it. How is coronary artery disease treated? · Your doctor will suggest that you make lifestyle changes. For example, your doctor may ask you to eat healthy foods, quit smoking, lose extra weight, and be more active. · You will have to take medicines. · Your doctor may suggest a procedure to open narrowed or blocked arteries. This is called angioplasty. Or your doctor may suggest using healthy blood vessels to create detours around narrowed or blocked arteries. This is called bypass surgery. Follow-up care is a key part of your treatment and safety. Be sure to make and go to all appointments, and call your doctor if you are having problems. It's also a good idea to know your test results and keep a list of the medicines you take. Where can you learn more? Go to http://florecita-georgie.info/. Enter [phone] in the search box to learn more about \"Learning About Coronary Artery Disease (CAD). \" Current as of: July 22, 2018 Content Version: 11.9 © 5214-5831 [employer]. Care instructions adapted under license by [employer] (which disclaims liability or warranty for this information). If you have questions about a medical condition or this instruction, always ask your healthcare professional. [place] [address] any warranty or liability for your use of this information. [employer] Activation Thank you for requesting access to [employer]. Please follow the instructions below to securely access and download your online medical record. [employer] allows you to send messages to your doctor, view your test results, renew your prescriptions, schedule appointments, and more. How Do I Sign Up? 1.  In your internet browser, go to [email or web address]. 2. Click on the First Time User? Click Here link in the Sign In box. You will see the New Member Sign Up page. 3. Enter your [employer] Access Code exactly as it appears below. You will not need to use this code after youve completed the sign-up process. If you do not sign up before the expiration date, you must request a new code. [employer] Access Code: [email or web address] Expires: 3/16/2019  9:00 AM (This is the date your [employer] access code will ) 4. Enter the last four digits of your Social Security Number (xxxx) and Date of Birth (mm/dd/yyyy) as indicated and click Submit. You will be taken to the next sign-up page. 5. Create a [employer] ID. This will be your [employer] login ID and cannot be changed, so think of one that is secure and easy to remember. 6. Create a [employer] password. You can change your password at any time. 7. Enter your Password Reset Question and Answer. This can be used at a later time if you forget your password. 8. Enter your e-mail address. You will receive e-mail notification when new information is available in [address] 9. Click Sign Up. You can now view and download portions of your medical record. 10. Click the Download Summary menu link to download a portable copy of your medical information. Additional Information If you have questions, please visit the Frequently Asked Questions section of the [employer] website at [email or web address] Remember, [employer] is NOT to be used for urgent needs. For medical emergencies, dial 911.  
 
 
Patient is scheduled to see Dr Fco Wallace on

## 2019-02-25 ENCOUNTER — OFFICE VISIT (OUTPATIENT)
Dept: PULMONOLOGY | Age: 64
End: 2019-02-25

## 2019-02-25 VITALS
OXYGEN SATURATION: 94 % | DIASTOLIC BLOOD PRESSURE: 80 MMHG | TEMPERATURE: 98.1 F | HEART RATE: 70 BPM | RESPIRATION RATE: 20 BRPM | HEIGHT: 66 IN | BODY MASS INDEX: 31.8 KG/M2 | SYSTOLIC BLOOD PRESSURE: 140 MMHG

## 2019-02-25 DIAGNOSIS — I25.708 CORONARY ARTERY DISEASE OF BYPASS GRAFT OF NATIVE HEART WITH STABLE ANGINA PECTORIS (HCC): ICD-10-CM

## 2019-02-25 DIAGNOSIS — I10 HYPERTENSION, UNSPECIFIED TYPE: Chronic | ICD-10-CM

## 2019-02-25 DIAGNOSIS — R05.3 COUGH, PERSISTENT: ICD-10-CM

## 2019-02-25 DIAGNOSIS — J90 PLEURAL EFFUSION: ICD-10-CM

## 2019-02-25 DIAGNOSIS — J45.40 MODERATE PERSISTENT ASTHMA WITHOUT COMPLICATION: Primary | ICD-10-CM

## 2019-02-25 RX ORDER — FLUTICASONE FUROATE AND VILANTEROL 100; 25 UG/1; UG/1
1 POWDER RESPIRATORY (INHALATION) DAILY
Qty: 1 INHALER | Refills: 0 | Status: SHIPPED | COMMUNITY
Start: 2019-02-25 | End: 2019-03-20 | Stop reason: SDUPTHER

## 2019-02-25 RX ORDER — HYDROCODONE POLISTIREX AND CHLORPHENIRAMINE POLISTIREX 10; 8 MG/5ML; MG/5ML
5 SUSPENSION, EXTENDED RELEASE ORAL
Qty: 115 ML | Refills: 0 | Status: SHIPPED | OUTPATIENT
Start: 2019-02-25 | End: 2019-03-26 | Stop reason: ALTCHOICE

## 2019-02-25 RX ORDER — ALBUTEROL SULFATE 0.83 MG/ML
2.5 SOLUTION RESPIRATORY (INHALATION) ONCE
Qty: 24 EACH | Refills: 3 | Status: SHIPPED | OUTPATIENT
Start: 2019-02-25 | End: 2019-02-25

## 2019-02-25 RX ORDER — FLUTICASONE FUROATE AND VILANTEROL 200; 25 UG/1; UG/1
1 POWDER RESPIRATORY (INHALATION) DAILY
Qty: 1 INHALER | Refills: 0 | Status: SHIPPED | COMMUNITY
Start: 2019-02-25 | End: 2019-02-25 | Stop reason: CLARIF

## 2019-02-25 RX ORDER — ALBUTEROL SULFATE 90 UG/1
1 AEROSOL, METERED RESPIRATORY (INHALATION)
Qty: 1 INHALER | Refills: 3 | Status: SHIPPED | OUTPATIENT
Start: 2019-02-25

## 2019-02-25 RX ORDER — DIAZEPAM 10 MG/1
TABLET ORAL
Refills: 4 | COMMUNITY
Start: 2019-02-04

## 2019-02-25 NOTE — PROGRESS NOTES
Chief Complaint Patient presents with  Referral / Consult  
  referred by Dr. Annalisa Pina for Asthma & SOB 1. Have you been to the ER, urgent care clinic since your last visit? Hospitalized since your last visit? Yes. January 2019-[facility] 2. Have you seen or consulted any other health care providers outside of the [address] [place] [address] since your last visit? Include any pap smears or colon screening.  Yes Where: Dr. Dustin Weiner, Psychologist

## 2019-02-25 NOTE — PATIENT INSTRUCTIONS
Fluticasone/Vilanterol (By breathing) Fluticasone Furoate (uxgq-MSV-a-sone FURE-oh-ate), Vilanterol Trifenatate (vye-BRANDY-ter-ol ajjc-ABG-k-zee) Treats asthma and chronic obstructive pulmonary disease (COPD). This medicine contains a steroid. Brand Name(s): Santino Mercado There may be other brand names for this medicine. When This Medicine Should Not Be Used: This medicine is not right for everyone. Do not use it if you had an allergic reaction to fluticasone, vilanterol, or milk proteins. How to Use This Medicine:  
Powder · Use this medicine at the same time every day exactly as directed. Never use it more often than your doctor told you to. · This medicine should come with a Medication Guide. Ask your pharmacist for a copy if you do not have one. · This medicine is a powder that is used with its own inhaler device. Keep the medicine in the foil tray until you are ready to use the inhaler. · Each time you open the cover of the inhaler and hear a click, the inhaler is ready to use. Do not close the cover again until you have taken your dose. You will lose the dose if you open and close the cover without inhaling the medicine. · When you take a dose, inhale through your mouth. Do not breath in through your nose. · When the counter on the medicine turns red, there are less than 10 doses left. Refill your prescription as soon as possible. · When you have finished all your inhalations, rinse your mouth out with water. · Missed dose: Take a dose as soon as you remember. If it is almost time for your next dose, wait until then and take a regular dose. Do not take extra medicine to make up for a missed dose. Do not take more than 1 puff per day. · Store the medicine in a closed container at room temperature, away from heat, moisture, and direct light. Throw away this medicine 6 weeks after it was opened or when the counter reads \"0.\" Drugs and Foods to Avoid: Ask your doctor or pharmacist before using any other medicine, including over-the-counter medicines, vitamins, and herbal products. · Do not use this medicine together with similar inhaled medicines, including arformoterol, budesonide/formoterol, formoterol, indacaterol, or salmeterol. · Some medicines can affect how this medicine works. Tell your doctor if you are using any of the following: ¨ Clarithromycin, conivaptan, indinavir, itraconazole, ketoconazole, lopinavir, nefazodone, nelfinavir, ritonavir, saquinavir, telithromycin, troleandomycin, voriconazole ¨ Beta-blocker ¨ Diuretic (water pill) ¨ Tricyclic antidepressant or MAO inhibitor within the past 2 weeks Warnings While Using This Medicine: · Tell your doctor if you are pregnant or breastfeeding, or if you have liver disease, diabetes, cataracts, glaucoma, heart or blood vessel disease, high blood pressure, heart rhythm problems, osteoporosis, thyroid problems, or a history of seizures. · This medicine may cause the following problems: 
¨ Increased risk of pneumonia ¨ Adrenal gland problems ¨ Increased risk of paradoxical bronchospasm (trouble breathing right after use) and asthma-related death ¨ Changes in heart rhythm ¨ Osteoporosis (when used for a long time) ¨ Glaucoma or cataracts · Do not use this medicine to treat acute attacks. You should have another medicine to use for an acute asthma attack or COPD flare-up. Tell your doctor right away if your condition gets worse or you need to use your other medicine more often than usual. 
· This medicine may weaken your immune system and increase your risk for infection. Tell your doctor about any immune system problems or infections you have, including herpes in your eye or tuberculosis. Tell your doctor right away if you have been exposed to chickenpox or measles. · Your doctor will check your progress and the effects of this medicine at regular visits. Keep all appointments. · Keep all medicine out of the reach of children. Never share your medicine with anyone. Possible Side Effects While Using This Medicine:  
Call your doctor right away if you notice any of these side effects: · Allergic reaction: Itching or hives, swelling in your face or hands, swelling or tingling in your mouth or throat, chest tightness, trouble breathing · Changes in skin color, dark freckles, weakness, tiredness, nausea, vomiting, weight loss · Chest pain · Eye pain, vision loss, seeing halos around lights · Fast, pounding, or uneven heartbeat · Fever, chills, cough, runny or stuffy nose, sore throat, body aches · Lightheadedness, dizziness, fainting · Worsening of breathing problems, shortness of breath, wheezing If you notice these less serious side effects, talk with your doctor: · Dry mouth · White patches in your mouth or throat, pain when you eat or swallow If you notice other side effects that you think are caused by this medicine, tell your doctor. Call your doctor for medical advice about side effects. You may report side effects to FDA at 5-606-FDA-4001 © 2017 [address]  Information is for End User's use only and may not be sold, redistributed or otherwise used for commercial purposes. The above information is an  only. It is not intended as medical advice for individual conditions or treatments. Talk to your doctor, nurse or pharmacist before following any medical regimen to see if it is safe and effective for you. Gastroesophageal Reflux Disease (GERD): Care Instructions Your Care Instructions Gastroesophageal reflux disease (GERD) is the backward flow of stomach acid into the esophagus. The esophagus is the tube that leads from your throat to your stomach. A one-way valve prevents the stomach acid from moving up into this tube. When you have GERD, this valve does not close tightly enough. If you have mild GERD symptoms including heartburn, you may be able to control the problem with antacids or over-the-counter medicine. Changing your diet, losing weight, and making other lifestyle changes can also help reduce symptoms. Follow-up care is a key part of your treatment and safety. Be sure to make and go to all appointments, and call your doctor if you are having problems. It's also a good idea to know your test results and keep a list of the medicines you take. How can you care for yourself at home? · Take your medicines exactly as prescribed. Call your doctor if you think you are having a problem with your medicine. · Your doctor may recommend over-the-counter medicine. For mild or occasional indigestion, antacids, such as Tums, Gaviscon, Mylanta, or Maalox, may help. Your doctor also may recommend over-the-counter acid reducers, such as Pepcid AC, Tagamet HB, Zantac 75, or Prilosec. Read and follow all instructions on the label. If you use these medicines often, talk with your doctor. · Change your eating habits. ? It's best to eat several small meals instead of two or three large meals. ? After you eat, wait 2 to 3 hours before you lie down. ? Chocolate, mint, and alcohol can make GERD worse. ? Spicy foods, foods that have a lot of acid (like tomatoes and oranges), and coffee can make GERD symptoms worse in some people. If your symptoms are worse after you eat a certain food, you may want to stop eating that food to see if your symptoms get better. · Do not smoke or chew tobacco. Smoking can make GERD worse. If you need help quitting, talk to your doctor about stop-smoking programs and medicines. These can increase your chances of quitting for good. · If you have GERD symptoms at night, raise the head of your bed 6 to 8 inches by putting the frame on blocks or placing a foam wedge under the head of your mattress. (Adding extra pillows does not work.) · Do not wear tight clothing around your middle. · Lose weight if you need to. Losing just 5 to 10 pounds can help. When should you call for help? Call your doctor now or seek immediate medical care if: 
  · You have new or different belly pain.  
  · Your stools are black and tarlike or have streaks of blood.  
 Watch closely for changes in your health, and be sure to contact your doctor if: 
  · Your symptoms have not improved after 2 days.  
  · Food seems to catch in your throat or chest.  
Where can you learn more? Go to http://florecita-georgie.info/. Enter I033 in the search box to learn more about \"Gastroesophageal Reflux Disease (GERD): Care Instructions. \" Current as of: March 27, 2018 Content Version: 11.9 © 0208-9224 [employer]. Care instructions adapted under license by [employer] (which disclaims liability or warranty for this information). If you have questions about a medical condition or this instruction, always ask your healthcare professional. [address] any warranty or liability for your use of this information.

## 2019-02-25 NOTE — PROGRESS NOTES
Verbal Order with read back per Adelfo Reyes MD  For PFT smart panel. AMB POC PFT complete w/ bronchodilator AMB POC PFT complete w/o bronchodilator Dr. Gigi Frank MD will co-sign the orders.

## 2019-02-25 NOTE — PROGRESS NOTES
[facility] Pulmonary, Critical Care, and Sleep Medicine Pulmonary Office Initial Consultation Name: Sherry Sharpe : 1955 Date: 2019 Subjective:  
Patient has been referred for evaluation of: Persistent cough Patient is a 58 y.o. female who is a non-smoker and has previously been diagnosed with asthma by Dr. Caty Kern. She had been on Advair but has not used it in several years. She continued to use as needed albuterol infrequently. Around 2018 patient started with symptoms of cough which was persistent to the point that it was causing gagging and choking. For most part the cough was dry and intermittently productive of mucus. She eventually went to the urgent care center where she was diagnosed with a left-sided pneumonia and possible pleural effusion and was given Rocephin and discharged on Levaquin. She continued to have symptoms of cough despite interventions and in December needed a course of Zithromax and prednisone after which the symptoms seem to subside for a short period of time and then started back again. Currently she mainly describes cough that is persistent and rather bothersome and comes in bouts she admits to some shortness of breath related with the coughing This does not have any postnasal drip. Denies fever denies chills denies any night sweats. She has been taking cough suppressants ranging from [employer] to Tussionex with partial relief. Currently she is using nebulized albuterol and inhaled albuterol as needed She has associated wheezing,  
denies  chest pain, fever, chills, night sweats Admits to having dyspepsia, reflux. Comorbid conditions include-hypertension, coronary artery disease status post stent, hypothyroidism, and idiopathic neuropathy Non smoker Occupational exposure-none. She is a retired nurse She moved into a new home in 2018 and states that she has been keeping the house very clean including having air filters. Previous owner had dogs They do not have any pets at the present time No other change in environment. Past Medical History:  
Diagnosis Date  Asthma  CAD (coronary artery disease)  Coronary artery disease  HTN (hypertension)  Hyperlipidemia  IC (interstitial cystitis)  MI (myocardial infarction) ([facility] [address]  Pleural effusion 01/2019  Pneumonia 01/2019 Past Surgical History:  
Procedure Laterality Date  CARDIAC SURG PROCEDURE UNLIST  HX APPENDECTOMY  HX CHOLECYSTECTOMY  HX CORONARY STENT PLACEMENT  2007  HX CORONARY STENT PLACEMENT    
 HX HYSTERECTOMY  HX TUBAL LIGATION Bilateral   
 
 
Social History Socioeconomic History  Marital status:  Spouse name: Not on file  Number of children: Not on file  Years of education: Not on file  Highest education level: Not on file Tobacco Use  Smoking status: Never Smoker  Smokeless tobacco: Never Used Substance and Sexual Activity  Alcohol use: Yes Comment: socially  Drug use: No  
 Sexual activity: No  
 
 
Family History Problem Relation Age of Onset  Cancer Mother  Heart Disease Father  Hypertension Father  Elevated Lipids Father  Diabetes Neg Hx  Stroke Neg Hx Allergies Allergen Reactions  Latex, Natural Rubber Rash  Adhesive Rash  Macrobid [Nitrofurantoin Monohyd/M-Cryst] Nausea and Vomiting Current Outpatient Medications Medication Sig Dispense Refill  diazePAM (VALIUM) 10 mg tablet TAKE 1 TABLET BY MOUTH EVERY DAY AS NEEDED  4  
 simvastatin (ZOCOR) 40 mg tablet TAKE 1 TAB BY MOUTH NIGHTLY. 30 Tab 3  
 amLODIPine (NORVASC) 5 mg tablet TAKE 1 TAB BY MOUTH DAILY. 90 Tab 1  clopidogrel (PLAVIX) 75 mg tab TAKE 1 TABLET BY MOUTH EVERY DAY 90 Tab 3  
 metoprolol succinate (TOPROL-XL) 50 mg XL tablet TAKE 1 TAB BY MOUTH DAILY.  90 Tab 1  
  icosapent ethyl (VASCEPA) 1 gram capsule Take 1 Cap by mouth two (2) times daily (with meals). 60 Cap 6  
 fenofibrate nanocrystallized (TRICOR) 48 mg tablet Take 1 Tab by mouth daily. 30 Tab 6  
 ondansetron (ZOFRAN ODT) 4 mg disintegrating tablet Take 1 Tab by mouth every eight (8) hours as needed for Nausea. 14 Tab 0  
 traZODone (DESYREL) 100 mg tablet Take 100 mg by mouth as needed.  nitroglycerin (NITROSTAT) 0.4 mg SL tablet by SubLINGual route every five (5) minutes as needed for Chest Pain.  phenazopyridine (PYRIDIUM) 100 mg tablet Take 100 mg by mouth as needed.  aspirin 81 mg chewable tablet Take 81 mg by mouth daily.  albuterol (PROAIR HFA) 90 mcg/actuation inhaler Take 2 Puffs by inhalation every six (6) hours as needed. Review of Systems: 
HEENT: No epistaxis, no nasal drainage, no difficulty in swallowing, no redness in eyes Respiratory: as above Cardiovascular: no chest pain, no palpitations, no chronic leg edema, no syncope Gastrointestinal: no abd pain, no vomiting, no diarrhea, no bleeding symptoms Genitourinary: No urinary symptoms or hematuria Integument/breast: No ulcers or rashes Musculoskeletal:Neg 
Neurological: No focal weakness, no seizures, no headaches Behvioral/Psych: No anxiety, no depression Constitutional: No fever, no chills, no weight loss, no night sweats Objective:  
 
Visit Vitals /80 (BP 1 Location: Left arm, BP Patient Position: At rest) Pulse 70 Temp 98.1 °F (36.7 °C) (Oral) Resp 20 Ht 5' 6\" (1.676 m) Wt 93.4 kg (206 lb) SpO2 94% BMI 33.25 kg/m² Physical Exam:  
General: comfortable, no acute distress HEENT: pupils reactive, sclera anicteric, EOM intact Neck: No adenopathy or thyroid swelling, no lymphadenopathy or JVD, supple CVS: S1S2 no murmurs RS: Mod AE bilaterally, no tactile fremitus or egophony, no accessory muscle use Abd: soft, non tender, no hepatosplenomegaly Neuro: non focal, awake, alert Extrm: no leg edema, clubbing or cyanosis Skin: no rash Data review: 
Pertinent labs: CBC, BMP, LFT's PFT: 
 
Date FVC FEV1  FEV1/FVC ANI21-03 TLC RV RV/TLC VC DLCO  
2/27  84  78  72  57 Results for orders placed or performed during the hospital encounter of 10/09/17 EKG, 12 LEAD, INITIAL Result Value Ref Range Ventricular Rate 56 BPM  
 Atrial Rate 56 BPM  
 P-R Interval 204 ms QRS Duration 84 ms Q-T Interval 452 ms QTC Calculation (Bezet) 436 ms Calculated P Axis 20 degrees Calculated R Axis 14 degrees Calculated T Axis 71 degrees Diagnosis Sinus bradycardia Otherwise normal ECG When compared with ECG of 18-SEP-2017 15:20, 
Criteria for Inferior infarct are no longer present Confirmed by Sherri Scott MD, --- ([id number]) on 10/9/2017 5:15:21 PM 
  
 
 
Imaging: 
I have personally reviewed the patients radiographs and have reviewed the reports: 
Chest x-ray done in clinic today was reviewed by myself and does not show any acute abnormalities. Specifically there is no pleural effusion XR Results (most recent): 
Results from Hospital Encounter encounter on 10/09/17 XR CHEST PORT Narrative Chest single view History: Chest pain for one day. Comparison: Multiple prior studies with the most recent from August 2016 Findings: The lungs are clear. No evidence to suggest pneumothorax of pleural effusion. The cardiomediastinal silhouette is unremarkable. Impression Impression: No radiographic evidence of acute cardiopulmonary process. CT Results (most recent): 
Results from Hospital Encounter encounter on 09/13/16 CT MAXILLOFACIAL WO CONT Narrative FACIAL CT WITHOUT CONTRAST WITH RECONSTRUCTIONS 
 
CPT CODE: 97227 HISTORY: Trip and fall injury several hours PTA; right-sided head/facial pain. COMPARISON: No prior imaging. TECHNIQUE: Helical 2.5 mm axial images acquired through the facial region. Sagittal and coronal reformations generated. CT scans at this facility are 
performed using dose optimization technique as appropriate with performed exam, 
to include automated exposure control, adjustment of mA and/or kV according to 
patient's size (including appropriate matching for site-specific examinations), 
or use of iterative reconstruction technique. FINDINGS:  
Mild right superior lateral periorbital soft tissue swelling. No foreign body. Facial bones are intact. Orbital structures are intact. Minimal mucosal thickening inferior left maxillary sinus. The paranasal sinuses 
are otherwise clear. Mandible intact. Mild to moderate degenerative changes upper cervical spine. Impression IMPRESSION: 
 
1. Mild right periorbital soft tissue contusions. No facial fracture. Thank you for this referral. 
  
 
.  
 
Patient Active Problem List  
Diagnosis Code  Asthma J45.909  Hypertension I10  
 CAD (coronary artery disease) I25.10  Hyperlipidemia E78.5  IC (interstitial cystitis) N30.10  Presence of stent in LAD coronary artery Z95.5  Acute coronary syndrome (HCC) I24.9  ACS (acute coronary syndrome) ([facility]) I24.9  LV dysfunction I51.9  Atypical chest pain R07.89  Coronary artery disease of bypass graft of native heart with stable angina pectoris ([facility]) I25.708 IMPRESSION:  
· Persistent cough with characteristics very suggestive of chronic airway inflammation likely triggered by pneumonia in October. Could be atypical pathogens like mycoplasma with residual cough. Response noted to prednisone suggests reactive airways. Currently there is no evidence of any acute infection or any pleural pathology · Asthma-at baseline mild intermittent currently moderate persistent · Coronary artery disease · Hypertension · History of interstitial cystitis RECOMMENDATIONS:  
 · Continue to optimize treatment for airway inflammation · Will add Laba and ICS- breo 100 samples provided · Continue with albuterol as needed · Adequate ambient air humidification advised · Will give a prescription for Tussionex for short-term use for severe cough. discussed need to be vigilant especially with her taking Valium · We will check CBC with differential and IgE level to guide further therapy · GERD instructions and precautions · We will follow-up in 4-6 weeks to assess response, discuss labs and further treatment Health maintenance screens deferred to Primary care provider.  
  
Pankaj Elder MD

## 2019-03-13 ENCOUNTER — TELEPHONE (OUTPATIENT)
Dept: CARDIOLOGY CLINIC | Age: 64
End: 2019-03-13

## 2019-03-13 DIAGNOSIS — I25.708 CORONARY ARTERY DISEASE OF BYPASS GRAFT OF NATIVE HEART WITH STABLE ANGINA PECTORIS (HCC): Primary | ICD-10-CM

## 2019-03-20 ENCOUNTER — TELEPHONE (OUTPATIENT)
Dept: PULMONOLOGY | Age: 64
End: 2019-03-20

## 2019-03-20 RX ORDER — FLUTICASONE FUROATE AND VILANTEROL 100; 25 UG/1; UG/1
1 POWDER RESPIRATORY (INHALATION) DAILY
Qty: 2 INHALER | Refills: 0 | Status: SHIPPED | COMMUNITY
Start: 2019-03-20 | End: 2019-04-11 | Stop reason: SDUPTHER

## 2019-03-20 NOTE — TELEPHONE ENCOUNTER
Pt states she was given sample of Breo and is out. She would like to come by office this afternoon and  another sample. Please call [phone].

## 2019-03-26 ENCOUNTER — OFFICE VISIT (OUTPATIENT)
Dept: CARDIOLOGY CLINIC | Age: 64
End: 2019-03-26

## 2019-03-26 VITALS
HEIGHT: 66 IN | HEART RATE: 65 BPM | SYSTOLIC BLOOD PRESSURE: 122 MMHG | DIASTOLIC BLOOD PRESSURE: 90 MMHG | BODY MASS INDEX: 31.8 KG/M2

## 2019-03-26 DIAGNOSIS — E78.5 HYPERLIPIDEMIA, UNSPECIFIED HYPERLIPIDEMIA TYPE: ICD-10-CM

## 2019-03-26 DIAGNOSIS — I25.118 CORONARY ARTERY DISEASE OF NATIVE ARTERY OF NATIVE HEART WITH STABLE ANGINA PECTORIS (HCC): Primary | ICD-10-CM

## 2019-03-26 DIAGNOSIS — I10 ESSENTIAL HYPERTENSION: ICD-10-CM

## 2019-03-26 DIAGNOSIS — Z95.5 PRESENCE OF STENT IN LAD CORONARY ARTERY: ICD-10-CM

## 2019-03-26 RX ORDER — FENOFIBRATE 48 MG/1
48 TABLET, COATED ORAL DAILY
Qty: 90 TAB | Refills: 2 | Status: SHIPPED | OUTPATIENT
Start: 2019-03-26 | End: 2020-01-28 | Stop reason: SDUPTHER

## 2019-03-26 RX ORDER — FENOFIBRATE 48 MG/1
TABLET, COATED ORAL
Qty: 30 TAB | Refills: 6 | Status: SHIPPED | OUTPATIENT
Start: 2019-03-26 | End: 2019-03-26 | Stop reason: SDUPTHER

## 2019-03-26 NOTE — PROGRESS NOTES
1. Have you been to the ER, urgent care clinic since your last visit? Hospitalized since your last visit? No    2. Have you seen or consulted any other health care providers outside of the [address] [place] [address] since your last visit? Include any pap smears or colon screening.  No

## 2019-03-26 NOTE — PROGRESS NOTES
HISTORY OF PRESENT ILLNESS  Cheryl Stanton is a 58 y.o. female. Hypertension   The history is provided by the patient. This is a chronic problem. The current episode started more than 1 week ago. Associated symptoms include shortness of breath. Pertinent negatives include no chest pain. Shortness of Breath   The history is provided by the patient. This is a chronic problem. The problem occurs frequently. The current episode started more than 1 week ago. The problem has been gradually worsening. Pertinent negatives include no ear pain, no neck pain, no wheezing, no chest pain, no syncope, no rash and no leg swelling. She has had prior hospitalizations. She has had prior ED visits. Associated medical issues include CAD. Associated medical issues do not include heart failure. Review of Systems   Constitutional: Negative for chills and weight loss. HENT: Negative for congestion, ear discharge, ear pain, hearing loss, nosebleeds and tinnitus. Eyes: Negative for blurred vision. Respiratory: Positive for shortness of breath. Negative for wheezing and stridor. Cardiovascular: Negative for chest pain, leg swelling and syncope. Gastrointestinal: Negative for heartburn. Musculoskeletal: Negative for myalgias and neck pain. Skin: Negative for itching and rash. Neurological: Negative for tingling, tremors, focal weakness and loss of consciousness. Psychiatric/Behavioral: Negative for depression and suicidal ideas.      Family History   Problem Relation Age of Onset   [address] [facility] Cancer Mother     Heart Disease Father     Hypertension Father     Elevated Lipids Father     Diabetes Neg Hx     Stroke Neg Hx        Past Medical History:   Diagnosis Date    Asthma     CAD (coronary artery disease)     Coronary artery disease     HTN (hypertension)     Hyperlipidemia     IC (interstitial cystitis)     MI (myocardial infarction) ([facility] [address]     Pleural effusion 01/2019    Pneumonia 01/2019       Past Surgical History:   Procedure Laterality Date    CARDIAC SURG PROCEDURE UNLIST      HX APPENDECTOMY      HX CHOLECYSTECTOMY      HX CORONARY STENT PLACEMENT  2007    HX CORONARY STENT PLACEMENT      HX HYSTERECTOMY      HX TUBAL LIGATION Bilateral        Social History     Tobacco Use    Smoking status: Never Smoker    Smokeless tobacco: Never Used   Substance Use Topics    Alcohol use: Yes     Frequency: Monthly or less     Drinks per session: 1 or 2     Binge frequency: Never     Comment: socially       Allergies   Allergen Reactions    Latex, Natural Rubber Rash    Adhesive Rash    Macrobid [Nitrofurantoin Monohyd/M-Cryst] Nausea and Vomiting       Outpatient Medications Marked as Taking for the 3/26/19 encounter (Office Visit) with Veronica Mejia MD   Medication Sig Dispense Refill    fenofibrate nanocrystallized (TRICOR) 48 mg tablet TAKE 1 TABLET BY MOUTH EVERY DAY 30 Tab 6    fluticasone furoate-vilanterol (BREO ELLIPTA) 100-25 mcg/dose inhaler Take 1 Puff by inhalation daily. 2 Inhaler 0    diazePAM (VALIUM) 10 mg tablet TAKE 1 TABLET BY MOUTH EVERY DAY AS NEEDED  4    albuterol (PROVENTIL HFA, VENTOLIN HFA, PROAIR HFA) 90 mcg/actuation inhaler Take 1 Puff by inhalation every six (6) hours as needed for Wheezing. 1 Inhaler 3    simvastatin (ZOCOR) 40 mg tablet TAKE 1 TAB BY MOUTH NIGHTLY. 30 Tab 3    amLODIPine (NORVASC) 5 mg tablet TAKE 1 TAB BY MOUTH DAILY. 90 Tab 1    clopidogrel (PLAVIX) 75 mg tab TAKE 1 TABLET BY MOUTH EVERY DAY 90 Tab 3    metoprolol succinate (TOPROL-XL) 50 mg XL tablet TAKE 1 TAB BY MOUTH DAILY. 90 Tab 1    ondansetron (ZOFRAN ODT) 4 mg disintegrating tablet Take 1 Tab by mouth every eight (8) hours as needed for Nausea. 14 Tab 0    traZODone (DESYREL) 100 mg tablet Take 100 mg by mouth as needed.  nitroglycerin (NITROSTAT) 0.4 mg SL tablet by SubLINGual route every five (5) minutes as needed for Chest Pain.       phenazopyridine (PYRIDIUM) 100 mg tablet Take 100 mg by mouth as needed.  aspirin 81 mg chewable tablet Take 81 mg by mouth daily. Visit Vitals  /90   Pulse 65   Ht 5' 6\" (1.676 m)   BMI 31.80 kg/m²     Physical Exam   Constitutional: She is oriented to person, place, and time. She appears well-developed and well-nourished. No distress. HENT:   Head: Atraumatic. Mouth/Throat: No oropharyngeal exudate. Eyes: Conjunctivae are normal. Right eye exhibits no discharge. Left eye exhibits no discharge. No scleral icterus. Neck: Normal range of motion. Neck supple. No JVD present. No tracheal deviation present. No thyromegaly present. Cardiovascular: Normal rate, regular rhythm and normal heart sounds. Exam reveals no gallop. No murmur heard. Pulmonary/Chest: Effort normal and breath sounds normal. No stridor. No respiratory distress. She has no wheezes. She has no rales. She exhibits no tenderness. Abdominal: Soft. There is no tenderness. There is no rebound. Musculoskeletal: Normal range of motion. She exhibits no edema or tenderness. Lymphadenopathy:     She has no cervical adenopathy. Neurological: She is alert and oriented to person, place, and time. She exhibits normal muscle tone. Skin: Skin is warm. She is not diaphoretic. Psychiatric: She has a normal mood and affect. Her behavior is normal.     ekg sinus rhythm with t wave inversion in anterior and inferior leads. [facility]  CONCLUSION:  1. Normal perfusion scan. 2. Normal wall motion and ejection fraction. 02/20/19   ECHO ADULT COMPLETE 02/20/2019 2/20/2019    Narrative · Estimated left ventricular ejection fraction is 61 - 65%. Left   ventricular mild concentric hypertrophy. Normal left ventricular wall   motion, no regional wall motion abnormality noted. Mild (grade 1) left   ventricular diastolic dysfunction. · Mild mitral annular calcification. Trace mitral valve regurgitation. · Mild aortic valve sclerosis.  Mild aortic valve regurgitation is present. · Trace tricuspid valve regurgitation. Pulmonary arterial systolic   pressure is 03.7 mmHg. There is no evidence of pulmonary hypertension. · Mild pulmonic valve regurgitation is present. Signed by: Silvano Alaniz MD     ASSESSMENT and PLAN    ICD-10-CM ICD-9-CM    1. Coronary artery disease of native artery of native heart with stable angina pectoris ([facility] [address] I25.118 414.01      413.9    2. Presence of stent in LAD coronary artery Z95.5 V45.82    3. Essential hypertension I10 401.9    4. Hyperlipidemia, unspecified hyperlipidemia type E78.5 272.4      No orders of the defined types were placed in this encounter. Follow-up and Dispositions    · Return in about 6 months (around 9/26/2019). cardiovascular risk and specific lipid/LDL goals reviewed  use of aspirin to prevent MI and TIA's discussed. Patient with history of CAD status post PCI to mid LAD followed by recent PTCA for in-stent stenosis. She is here for follow-up complaints of shortness of breath and worsening cough. Currently being managed for asthma exacerbation. Discussed at length regarding changing beta-blocker to calcium channel blocker if her shortness of breath persists. Recent echo report reviewed with patient- EF has normalized. Meanwhile continue current medications follow-up in 6 months.

## 2019-04-08 NOTE — TELEPHONE ENCOUNTER
Last Office visit:  2/25/19  Follow up visit:  4/11/19 (Dr. Tanya Farias)    Office received fax from [employer], stating patient requested they reach out to our office re: starting patient back on Flovent 220 mcg inhaler. Fax sent back to Pharmacist that patient was given samples of Jarad Mcdermott to try at her last appointment. If she wants to switch medications she can discuss with Dr. Sheila Nino at her appointment on 4/11/19.

## 2019-04-10 ENCOUNTER — HOSPITAL ENCOUNTER (OUTPATIENT)
Dept: LAB | Age: 64
Discharge: HOME | End: 2019-04-10
Payer: COMMERCIAL

## 2019-04-10 ENCOUNTER — TELEPHONE (OUTPATIENT)
Dept: PULMONOLOGY | Age: 64
End: 2019-04-10

## 2019-04-10 DIAGNOSIS — J45.40 MODERATE PERSISTENT ASTHMA WITHOUT COMPLICATION: ICD-10-CM

## 2019-04-10 LAB
BASOPHILS # BLD: 0 K/UL (ref 0–0.1)
BASOPHILS NFR BLD: 0 % (ref 0–2)
DIFFERENTIAL METHOD BLD: NORMAL
EOSINOPHIL # BLD: 0.2 K/UL (ref 0–0.4)
EOSINOPHIL NFR BLD: 2 % (ref 0–5)
ERYTHROCYTE [DISTWIDTH] IN BLOOD BY AUTOMATED COUNT: 12.6 % (ref 11.6–14.5)
HCT VFR BLD AUTO: 41.6 % (ref 35–45)
HGB BLD-MCNC: 13.9 G/DL (ref 12–16)
LYMPHOCYTES # BLD: 2 K/UL (ref 0.9–3.6)
LYMPHOCYTES NFR BLD: 27 % (ref 21–52)
MCH RBC QN AUTO: 31.1 PG (ref 24–34)
MCHC RBC AUTO-ENTMCNC: 33.4 G/DL (ref 31–37)
MCV RBC AUTO: 93.1 FL (ref 74–97)
MONOCYTES # BLD: 0.6 K/UL (ref 0.05–1.2)
MONOCYTES NFR BLD: 8 % (ref 3–10)
NEUTS SEG # BLD: 4.4 K/UL (ref 1.8–8)
NEUTS SEG NFR BLD: 63 % (ref 40–73)
PLATELET # BLD AUTO: 222 K/UL (ref 135–420)
PMV BLD AUTO: 11.3 FL (ref 9.2–11.8)
RBC # BLD AUTO: 4.47 M/UL (ref 4.2–5.3)
WBC # BLD AUTO: 7.2 K/UL (ref 4.6–13.2)

## 2019-04-10 PROCEDURE — 82785 ASSAY OF IGE: CPT

## 2019-04-10 PROCEDURE — 36415 COLL VENOUS BLD VENIPUNCTURE: CPT

## 2019-04-10 PROCEDURE — 85025 COMPLETE CBC W/AUTO DIFF WBC: CPT

## 2019-04-10 NOTE — TELEPHONE ENCOUNTER
The pt. Is contacted and reminded that she was suppose to have a CBC and IgE labs done. She hadn't recalled the need and says she probably won't be able to go to the lab until tomorrow.

## 2019-04-11 ENCOUNTER — OFFICE VISIT (OUTPATIENT)
Dept: PULMONOLOGY | Age: 64
End: 2019-04-11

## 2019-04-11 VITALS
OXYGEN SATURATION: 95 % | SYSTOLIC BLOOD PRESSURE: 120 MMHG | DIASTOLIC BLOOD PRESSURE: 70 MMHG | BODY MASS INDEX: 31.8 KG/M2 | HEART RATE: 69 BPM | HEIGHT: 66 IN | TEMPERATURE: 98 F | RESPIRATION RATE: 20 BRPM

## 2019-04-11 DIAGNOSIS — J45.40 MODERATE PERSISTENT ASTHMA WITHOUT COMPLICATION: Primary | Chronic | ICD-10-CM

## 2019-04-11 DIAGNOSIS — R05.3 COUGH, PERSISTENT: ICD-10-CM

## 2019-04-11 DIAGNOSIS — I25.708 CORONARY ARTERY DISEASE OF BYPASS GRAFT OF NATIVE HEART WITH STABLE ANGINA PECTORIS (HCC): ICD-10-CM

## 2019-04-11 DIAGNOSIS — I10 ESSENTIAL HYPERTENSION: Chronic | ICD-10-CM

## 2019-04-11 RX ORDER — FLUTICASONE FUROATE AND VILANTEROL 100; 25 UG/1; UG/1
1 POWDER RESPIRATORY (INHALATION) DAILY
Qty: 2 INHALER | Refills: 0 | Status: SHIPPED | COMMUNITY
Start: 2019-04-11 | End: 2020-01-14 | Stop reason: ALTCHOICE

## 2019-04-11 RX ORDER — MONTELUKAST SODIUM 10 MG/1
10 TABLET ORAL DAILY
Qty: 30 TAB | Refills: 3 | Status: SHIPPED | OUTPATIENT
Start: 2019-04-11 | End: 2019-08-11 | Stop reason: SDUPTHER

## 2019-04-11 RX ORDER — BUDESONIDE 0.5 MG/2ML
500 INHALANT ORAL 2 TIMES DAILY
Qty: 60 EACH | Refills: 1 | Status: SHIPPED | OUTPATIENT
Start: 2019-04-11 | End: 2019-06-08 | Stop reason: SDUPTHER

## 2019-04-11 NOTE — PROGRESS NOTES
[facility] Pulmonary, Critical Care, and Sleep Medicine Pulmonary Office follow up Name: Mike Loza : 1955 Date: 2019 Subjective:  
Patient has been referred for evaluation of: Persistent cough. 19 Lidia Padilla continues to have cough which she describes as almost daily which comes in bouts and is causing significant impact on her quality of life. [place] [facility] [place] does acknowledge seems to be helping some. For most part she feels that just sitting down calming herself down seems to make a difference. Denies any wheezing. Denies any fever any chills Denies any other systemic symptoms. She has a nebulizer at home but has not been using it much. She did not get her Tussionex prescription filled and states that she can return the prescription if needed. HPI: 
Patient is a 58 y.o. female who is a non-smoker and has previously been diagnosed with asthma by Dr. Glo Haynes. She had been on Advair but has not used it in several years. She continued to use as needed albuterol infrequently. Around 2018 patient started with symptoms of cough which was persistent to the point that it was causing gagging and choking. For most part the cough was dry and intermittently productive of mucus. She eventually went to the urgent care center where she was diagnosed with a left-sided pneumonia and possible pleural effusion and was given Rocephin and discharged on Levaquin. She continued to have symptoms of cough despite interventions and in December needed a course of Zithromax and prednisone after which the symptoms seem to subside for a short period of time and then started back again. Currently she mainly describes cough that is persistent and rather bothersome and comes in bouts she admits to some shortness of breath related with the coughing This does not have any postnasal drip. Denies fever denies chills denies any night sweats. She has been taking cough suppressants ranging from [employer] to Tussionex with partial relief. Currently she is using nebulized albuterol and inhaled albuterol as needed She has associated wheezing,  
denies  chest pain, fever, chills, night sweats Admits to having dyspepsia, reflux. Comorbid conditions include-hypertension, coronary artery disease status post stent, hypothyroidism, and idiopathic neuropathy Non smoker Occupational exposure-none. She is a retired nurse She moved into a new home in August 2018 and states that she has been keeping the house very clean including having air filters. Previous owner had dogs They do not have any pets at the present time No other change in environment. Past Medical History:  
Diagnosis Date  Asthma  CAD (coronary artery disease)  Coronary artery disease  HTN (hypertension)  Hyperlipidemia  IC (interstitial cystitis)  MI (myocardial infarction) ([facility] [address]  Pleural effusion 01/2019  Pneumonia 01/2019 Past Surgical History:  
Procedure Laterality Date  CARDIAC SURG PROCEDURE UNLIST  HX APPENDECTOMY  HX CHOLECYSTECTOMY  HX CORONARY STENT PLACEMENT  2007  HX CORONARY STENT PLACEMENT    
 HX HYSTERECTOMY  HX TUBAL LIGATION Bilateral   
 
Allergies Allergen Reactions  Latex, Natural Rubber Rash  Adhesive Rash  Macrobid [Nitrofurantoin Monohyd/M-Cryst] Nausea and Vomiting Current Outpatient Medications Medication Sig Dispense Refill  fenofibrate nanocrystallized (TRICOR) 48 mg tablet Take 1 Tab by mouth daily. 90 Tab 2  
 fluticasone furoate-vilanterol (BREO ELLIPTA) 100-25 mcg/dose inhaler Take 1 Puff by inhalation daily.  2 Inhaler 0  
 diazePAM (VALIUM) 10 mg tablet TAKE 1 TABLET BY MOUTH EVERY DAY AS NEEDED  4  
  albuterol (PROVENTIL HFA, VENTOLIN HFA, PROAIR HFA) 90 mcg/actuation inhaler Take 1 Puff by inhalation every six (6) hours as needed for Wheezing. 1 Inhaler 3  
 simvastatin (ZOCOR) 40 mg tablet TAKE 1 TAB BY MOUTH NIGHTLY. 30 Tab 3  
 amLODIPine (NORVASC) 5 mg tablet TAKE 1 TAB BY MOUTH DAILY. 90 Tab 1  clopidogrel (PLAVIX) 75 mg tab TAKE 1 TABLET BY MOUTH EVERY DAY 90 Tab 3  
 metoprolol succinate (TOPROL-XL) 50 mg XL tablet TAKE 1 TAB BY MOUTH DAILY. 90 Tab 1  
 ondansetron (ZOFRAN ODT) 4 mg disintegrating tablet Take 1 Tab by mouth every eight (8) hours as needed for Nausea. 14 Tab 0  
 traZODone (DESYREL) 100 mg tablet Take 100 mg by mouth as needed.  nitroglycerin (NITROSTAT) 0.4 mg SL tablet by SubLINGual route every five (5) minutes as needed for Chest Pain.  phenazopyridine (PYRIDIUM) 100 mg tablet Take 100 mg by mouth as needed.  aspirin 81 mg chewable tablet Take 81 mg by mouth daily.  icosapent ethyl (VASCEPA) 1 gram capsule Take 1 Cap by mouth two (2) times daily (with meals). 60 Cap 6 Review of Systems: 
HEENT: No epistaxis, no nasal drainage, no difficulty in swallowing, no redness in eyes Respiratory: as above Cardiovascular: no chest pain, no palpitations, no chronic leg edema, no syncope Gastrointestinal: no abd pain, no vomiting, no diarrhea, no bleeding symptoms Genitourinary: No urinary symptoms or hematuria Integument/breast: No ulcers or rashes Musculoskeletal:Neg 
Neurological: No focal weakness, no seizures, no headaches Behvioral/Psych: No anxiety, no depression Constitutional: No fever, no chills, no weight loss, no night sweats Objective:  
 
Visit Vitals /70 (BP 1 Location: Left arm, BP Patient Position: Sitting) Pulse 69 Temp 98 °F (36.7 °C) Resp 20 Ht 5' 6\" (1.676 m) SpO2 95% BMI 31.80 kg/m² Physical Exam:  
General: comfortable, no acute distress HEENT: pupils reactive, sclera anicteric, EOM intact Neck: No adenopathy or thyroid swelling, no lymphadenopathy or JVD, supple CVS: S1S2 no murmurs RS: Mod AE bilaterally, no tactile fremitus or egophony, no accessory muscle use Abd: soft, non tender, no hepatosplenomegaly Neuro: non focal, awake, alert Extrm: no leg edema, clubbing or cyanosis Skin: no rash Data review: 
Pertinent labs: CBC, BMP, LFT's PFT: 
 
Date FVC FEV1  FEV1/FVC KDO99-03 TLC RV RV/TLC VC DLCO  
2/27  84  78  72  57 Results for orders placed or performed during the hospital encounter of 10/09/17 EKG, 12 LEAD, INITIAL Result Value Ref Range Ventricular Rate 56 BPM  
 Atrial Rate 56 BPM  
 P-R Interval 204 ms QRS Duration 84 ms Q-T Interval 452 ms QTC Calculation (Bezet) 436 ms Calculated P Axis 20 degrees Calculated R Axis 14 degrees Calculated T Axis 71 degrees Diagnosis Sinus bradycardia Otherwise normal ECG When compared with ECG of 18-SEP-2017 15:20, 
Criteria for Inferior infarct are no longer present Confirmed by Valentin Calles MD, --- ([id number]) on 10/9/2017 5:15:21 PM 
  
 
 
Imaging: 
I have personally reviewed the patients radiographs and have reviewed the reports: 
Chest x-ray done in clinic today was reviewed by myself and does not show any acute abnormalities. Specifically there is no pleural effusion XR Results (most recent): 
Results from Appointment encounter on 02/25/19 XR CHEST PA LAT Narrative Examination: PA and lateral chest  
 
History: Pleural effusion Comparison: October 9, 2017 Findings: The lungs are grossly clear. The heart is no acute osseous 
abnormality. Impression Impression: 1. No acute process CT Results (most recent): 
Results from Hospital Encounter encounter on 09/13/16 CT MAXILLOFACIAL WO CONT Narrative FACIAL CT WITHOUT CONTRAST WITH RECONSTRUCTIONS 
 
CPT CODE: 64478 HISTORY: Trip and fall injury several hours PTA; right-sided head/facial pain. 
 
COMPARISON: No prior imaging. TECHNIQUE: Helical 2.5 mm axial images acquired through the facial region. Sagittal and coronal reformations generated. CT scans at this facility are 
performed using dose optimization technique as appropriate with performed exam, 
to include automated exposure control, adjustment of mA and/or kV according to 
patient's size (including appropriate matching for site-specific examinations), 
or use of iterative reconstruction technique. FINDINGS:  
Mild right superior lateral periorbital soft tissue swelling. No foreign body. Facial bones are intact. Orbital structures are intact. Minimal mucosal thickening inferior left maxillary sinus. The paranasal sinuses 
are otherwise clear. Mandible intact. Mild to moderate degenerative changes upper cervical spine. Impression IMPRESSION: 
 
1. Mild right periorbital soft tissue contusions. No facial fracture. Thank you for this referral. 
  
 
.  
 
Patient Active Problem List  
Diagnosis Code  Asthma J45.909  Hypertension I10  
 CAD (coronary artery disease) I25.10  Hyperlipidemia E78.5  IC (interstitial cystitis) N30.10  Presence of stent in LAD coronary artery Z95.5  Acute coronary syndrome (HCC) I24.9  ACS (acute coronary syndrome) (HCC) I24.9  LV dysfunction I51.9  Atypical chest pain R07.89  Coronary artery disease of bypass graft of native heart with stable angina pectoris ([facility]) I25.708 IMPRESSION:  
· Persistent cough with characteristics very suggestive of chronic airway inflammation likely triggered by pneumonia in October. Could be atypical pathogens like mycoplasma with residual cough. Response noted to prednisone suggests reactive airways. Currently there is no evidence of any acute infection or any pleural pathology. She continues to have symptoms which I suspect are related to persistent airway inflammation · Asthma-at baseline mild intermittent currently moderate persistent · Coronary artery disease · Hypertension · History of interstitial cystitis RECOMMENDATIONS:  
· Continue to optimize treatment for airway inflammation · Add Pulmicort via nebulizer twice daily · Add Singulair 10 mg daily · continue Laba and ICS- breo 100 samples provided · Continue with albuterol as needed · Adequate ambient air humidification advised · IgE level to guide further therapy-drawn but results pending · GERD instructions and precautions · We will follow-up in 4-6 weeks to assess response, discuss labs and further treatment Health maintenance screens deferred to Primary care provider.  
  
Patricia Smith MD

## 2019-04-11 NOTE — PROGRESS NOTES
The pt. C/o prod. Cough with rare prod. of clear sputum. Post nasal drip. She denies elevated temp. Sarah Rm No ED nor Urgent Care visits since pneumonia late December. She is awaiting an appt. with Dr. Ruthann Rudolph for possible thyroid problems.

## 2019-04-17 LAB — IGE SERPL-ACNC: 16 IU/ML (ref 6–495)

## 2019-05-01 RX ORDER — METOPROLOL SUCCINATE 50 MG/1
TABLET, EXTENDED RELEASE ORAL
Qty: 90 TAB | Refills: 1 | Status: SHIPPED | OUTPATIENT
Start: 2019-05-01 | End: 2019-08-29 | Stop reason: SDUPTHER

## 2019-05-01 RX ORDER — SIMVASTATIN 40 MG/1
TABLET, FILM COATED ORAL
Qty: 30 TAB | Refills: 3 | Status: SHIPPED | OUTPATIENT
Start: 2019-05-01 | End: 2020-01-28 | Stop reason: SDUPTHER

## 2019-06-03 ENCOUNTER — OFFICE VISIT (OUTPATIENT)
Dept: PULMONOLOGY | Age: 64
End: 2019-06-03

## 2019-06-03 VITALS
DIASTOLIC BLOOD PRESSURE: 80 MMHG | SYSTOLIC BLOOD PRESSURE: 144 MMHG | HEART RATE: 59 BPM | BODY MASS INDEX: 31.8 KG/M2 | HEIGHT: 66 IN | TEMPERATURE: 97.4 F | RESPIRATION RATE: 18 BRPM | OXYGEN SATURATION: 96 %

## 2019-06-03 DIAGNOSIS — J45.20 MILD INTERMITTENT ASTHMA WITHOUT COMPLICATION: Primary | Chronic | ICD-10-CM

## 2019-06-03 DIAGNOSIS — K21.9 GASTROESOPHAGEAL REFLUX DISEASE WITHOUT ESOPHAGITIS: ICD-10-CM

## 2019-06-03 DIAGNOSIS — I10 ESSENTIAL HYPERTENSION: Chronic | ICD-10-CM

## 2019-06-03 DIAGNOSIS — I25.708 CORONARY ARTERY DISEASE OF BYPASS GRAFT OF NATIVE HEART WITH STABLE ANGINA PECTORIS (HCC): ICD-10-CM

## 2019-06-03 RX ORDER — FLUTICASONE FUROATE AND VILANTEROL 100; 25 UG/1; UG/1
1 POWDER RESPIRATORY (INHALATION) DAILY
Qty: 3 INHALER | Refills: 0 | Status: SHIPPED | COMMUNITY
Start: 2019-06-03 | End: 2020-01-14 | Stop reason: ALTCHOICE

## 2019-06-03 RX ORDER — CEPHALEXIN 500 MG/1
500 CAPSULE ORAL 4 TIMES DAILY
COMMUNITY
End: 2019-07-29

## 2019-06-03 NOTE — PROGRESS NOTES
The pt. Refuses to weigh. She states her cough has improved and occurs rarely. Dangelo Waldron presents today for   Chief Complaint   Patient presents with    Cough     F/U to 4/11 IgE       Is someone accompanying this pt? no    Is the patient using any DME equipment during OV? Nebulizer    -DME Company None    Depression Screening:  3 most recent PHQ Screens 4/11/2019   Little interest or pleasure in doing things Not at all   Feeling down, depressed, irritable, or hopeless Not at all   Total Score PHQ 2 0       Learning Assessment:  Learning Assessment 8/17/2016   PRIMARY LEARNER Patient   CO-LEARNER CAREGIVER -   PRIMARY LANGUAGE ENGLISH   LEARNER PREFERENCE PRIMARY LISTENING     -   ANSWERED BY patient   RELATIONSHIP SELF   ASSESSMENT COMMENT -       Abuse Screening:  Pt denies    Fall Risk  No      Coordination of Care:  1. Have you been to the ER, urgent care clinic since your last visit? Hospitalized since your last visit? Yes [place]    2. Have you seen or consulted any other health care providers outside of the [address] [place] [address] since your last visit? No    Medication variance in dosage/sig per patient as follows: Per Med. Rec.

## 2019-06-03 NOTE — PROGRESS NOTES
[facility]  Pulmonary, Critical Care, and Sleep Medicine      Pulmonary Office follow up    Name: Aiden Alonzo     : 1955     Date: 6/3/2019        Subjective:   Patient has been referred for evaluation of: Persistent cough. 19     Blanca Iniguez states that the cough is significantly improved and the only time she gets some cough is when she has some reflux. She has been taking Pepcid with some relief  She is scheduled to see GI next month  She has [place] [facility] [place] she does acknowledge seems to be helping some. Denies any wheezing. Denies any fever any chills  Denies any other systemic symptoms. She has a nebulizer at home but has not been using it much. HPI:  Patient is a 58 y.o. female who is a non-smoker and has previously been diagnosed with asthma by Dr. Bryce Zepeda. She had been on Advair but has not used it in several years. She continued to use as needed albuterol infrequently. Around 2018 patient started with symptoms of cough which was persistent to the point that it was causing gagging and choking. For most part the cough was dry and intermittently productive of mucus. She eventually went to the urgent care center where she was diagnosed with a left-sided pneumonia and possible pleural effusion and was given Rocephin and discharged on Levaquin. She continued to have symptoms of cough despite interventions and in December needed a course of Zithromax and prednisone after which the symptoms seem to subside for a short period of time and then started back again. Currently she mainly describes cough that is persistent and rather bothersome and comes in bouts she admits to some shortness of breath related with the coughing    This does not have any postnasal drip. Denies fever denies chills denies any night sweats. She has been taking cough suppressants ranging from [address] to Tussionex with partial relief.   Currently she is using nebulized albuterol and inhaled albuterol as needed    She has associated wheezing,   denies  chest pain, fever, chills, night sweats   Admits to having dyspepsia, reflux. 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 Previous owner had dogs  They do not have any pets at the present time  No other change in environment. Past Medical History:   Diagnosis Date    Asthma     CAD (coronary artery disease)     Coronary artery disease     HTN (hypertension)     Hyperlipidemia     IC (interstitial cystitis)     MI (my[facility] infarction) ([facility] [address]     Pleural effusion 01/2019    Pneumonia 01/2019     Past Surgical History:   Procedure Laterality Date    CARDIAC SURG PROCEDURE UNLIST      HX APPENDECTOMY      HX CHOLECYSTECTOMY      HX CORONARY STENT PLACEMENT  2007    HX CORONARY STENT PLACEMENT      HX HYSTERECTOMY      HX TUBAL LIGATION Bilateral      Allergies   Allergen Reactions    Latex, Natural Rubber Rash    Adhesive Rash    Macrobid [Nitrofurantoin Monohyd/M-Cryst] Nausea and Vomiting     Current Outpatient Medications   Medication Sig Dispense Refill    cephALEXin (KEFLEX) 500 mg capsule Take 500 mg by mouth four (4) times daily.  simvastatin (ZOCOR) 40 mg tablet TAKE 1 TAB BY MOUTH NIGHTLY. 30 Tab 3    metoprolol succinate (TOPROL-XL) 50 mg XL tablet TAKE 1 TABLET BY MOUTH EVERY DAY 90 Tab 1    montelukast (SINGULAIR) 10 mg tablet Take 1 Tab by mouth daily. 30 Tab 3    budesonide (PULMICORT) 0.5 mg/2 mL nbsp 2 mL by Nebulization route two (2) times a day. 60 Each 1    fluticasone furoate-vilanterol (BREO ELLIPTA) 100-25 mcg/dose inhaler Take 1 Puff by inhalation daily.  2 Inhaler 0    fenofibrate nanocrystallized (TRICOR) 48 mg tablet Take 1 Tab by mouth daily. 90 Tab 2    diazePAM (VALIUM) 10 mg tablet TAKE 1 TABLET BY MOUTH EVERY DAY AS NEEDED  4    albuterol (PROVENTIL HFA, VENTOLIN HFA, PROAIR HFA) 90 mcg/actuation inhaler Take 1 Puff by inhalation every six (6) hours as needed for Wheezing. 1 Inhaler 3    amLODIPine (NORVASC) 5 mg tablet TAKE 1 TAB BY MOUTH DAILY. 90 Tab 1    clopidogrel (PLAVIX) 75 mg tab TAKE 1 TABLET BY MOUTH EVERY DAY 90 Tab 3    ondansetron (ZOFRAN ODT) 4 mg disintegrating tablet Take 1 Tab by mouth every eight (8) hours as needed for Nausea. 14 Tab 0    traZODone (DESYREL) 100 mg tablet Take 100 mg by mouth as needed.  nitroglycerin (NITROSTAT) 0.4 mg SL tablet by SubLINGual route every five (5) minutes as needed for Chest Pain.  phenazopyridine (PYRIDIUM) 100 mg tablet Take 100 mg by mouth as needed.  aspirin 81 mg chewable tablet Take 81 mg by mouth daily.  icosapent ethyl (VASCEPA) 1 gram capsule Take 1 Cap by mouth two (2) times daily (with meals).  60 Cap 6     Review of Systems:  HEENT: No epistaxis, no nasal drainage, no difficulty in swallowing, no redness in eyes  Respiratory: as above  Cardiovascular: no chest pain, no palpitations, no chronic leg edema, no syncope  Gastrointestinal: no abd pain, no vomiting, no diarrhea, no bleeding symptoms  Genitourinary: No urinary symptoms or hematuria  Integument/breast: No ulcers or rashes  Musculoskeletal:Neg  Neurological: No focal weakness, no seizures, no headaches  Behvioral/Psych: No anxiety, no depression  Constitutional: No fever, no chills, no weight loss, no night sweats     Objective:     Visit Vitals  /80 (BP 1 Location: Left arm, BP Patient Position: Sitting)   Pulse (!) 59   Temp 97.4 °F (36.3 °C)   Resp 18   Ht 5' 6\" (1.676 m)   SpO2 96%   BMI 31.80 kg/m²        Physical Exam:   General: comfortable, no acute distress  HEENT: pupils reactive, sclera anicteric, EOM intact  Neck: No adenopathy or thyroid swelling, no lymphadenopathy or JVD, supple  CVS: S1S2 no murmurs  RS: Mod AE bilaterally, no tactile fremitus or egophony, no accessory muscle use  Abd: soft, non tender, no hepatosplenomegaly  Neuro: non focal, awake, alert  Extrm: no leg edema, clubbing or cyanosis  Skin: no rash    Data review:   Pertinent labs: CBC, BMP, LFT's  PFT:    Date FVC FEV1  FEV1/FVC [id number] TLC RV RV/TLC VC DLCO   2/27  84  78  72  57                                              Results for orders placed or performed during the hospital encounter of 10/09/17   EKG, 12 LEAD, INITIAL   Result Value Ref Range    Ventricular Rate 56 BPM    Atrial Rate 56 BPM    P-R Interval 204 ms    QRS Duration 84 ms    Q-T Interval 452 ms    QTC Calculation (Bezet) 436 ms    Calculated P Axis 20 degrees    Calculated R Axis 14 degrees    Calculated T Axis 71 degrees    Diagnosis       Sinus bradycardia  Otherwise normal ECG  When compared with ECG of 18-SEP-2017 15:20,  Criteria for Inferior infarct are no longer present  Confirmed by Jony Reyes MD, --- ([id number]) on 10/9/2017 5:15:21 PM         Imaging:  I have personally reviewed the patients radiographs and have reviewed the reports:  Chest x-ray done in clinic today was reviewed by myself and does not show any acute abnormalities. Specifically there is no pleural effusion  XR Results (most recent):  Results from Appointment encounter on 02/25/19   XR CHEST PA LAT    Narrative Examination: PA and lateral chest     History: Pleural effusion    Comparison: October 9, 2017    Findings: The lungs are grossly clear. The heart is no acute osseous  abnormality. Impression Impression:  1.   No acute process       CT Results (most recent):  Results from Hospital Encounter encounter on 09/13/16   CT MAXILLOFACIAL WO CONT    Narrative FACIAL CT WITHOUT CONTRAST WITH RECONSTRUCTIONS    CPT CODE: 87582    HISTORY: Trip and fall injury several hours PTA; right-sided head/facial pain.    COMPARISON: No prior imaging. TECHNIQUE: Helical 2.5 mm axial images acquired through the facial region. Sagittal and coronal reformations generated. CT scans at this facility are  performed using dose optimization technique as appropriate with performed exam,  to include automated exposure control, adjustment of mA and/or kV according to  patient's size (including appropriate matching for site-specific examinations),  or use of iterative reconstruction technique. FINDINGS:   Mild right superior lateral periorbital soft tissue swelling. No foreign body. Facial bones are intact. Orbital structures are intact. Minimal mucosal thickening inferior left maxillary sinus. The paranasal sinuses  are otherwise clear. Mandible intact. Mild to moderate degenerative changes upper cervical spine. Impression IMPRESSION:    1. Mild right periorbital soft tissue contusions. No facial fracture. Thank you for this referral.       .     Patient Active Problem List   Diagnosis Code    Asthma J45.909    Hypertension I10    CAD (coronary artery disease) I25.10    Hyperlipidemia E78.5    IC (interstitial cystitis) N30.10    Presence of stent in LAD coronary artery Z95.5    Acute coronary syndrome (HCC) I24.9    ACS ([facility] syndrome) ([facility]) I24.9    LV dysfunction I51.9    Atypical chest pain R07.89    Coronary artery disease of bypass graft of native heart with stable angina pectoris ([facility]) I25.708     IMPRESSION:   · Persistent cough with characteristics very suggestive of chronic airway inflammation likely triggered by pneumonia in October. Could be atypical pathogens like mycoplasma with residual cough. Response noted to prednisone suggests reactive airways. Currently there is no evidence of any acute infection or any pleural pathology.   Overall significant improvement in symptoms with treatment  · Asthma-at baseline mild intermittent currently moderate persistent  · GERD likely also playing a role in her chronic cough  · Coronary artery disease  · Hypertension  · History of interstitial cystitis      RECOMMENDATIONS:   · Continue to optimize treatment for airway inflammation  · Pulmicort via nebulizer twice daily  · Continue Singulair 10 mg daily  · continue Laba and ICS- breo 100 samples provided  · Continue with albuterol as needed  · Adequate ambient air humidification advised   · IgE level -normal  · GERD instructions and precautions follow-up with GI. · We will follow-up in 6 months and sooner should there be any change     Health maintenance screens deferred to Primary care provider.      Benny Vazquez MD

## 2019-06-03 NOTE — PATIENT INSTRUCTIONS
Gastroesophageal Reflux Disease (GERD): Care Instructions  Your Care Instructions    Gastroesophageal reflux disease (GERD) is the backward flow of stomach acid into the esophagus. The esophagus is the tube that leads from your throat to your stomach. A one-way valve prevents the stomach acid from moving up into this tube. When you have GERD, this valve does not close tightly enough. If you have mild GERD symptoms including heartburn, you may be able to control the problem with antacids or over-the-counter medicine. Changing your diet, losing weight, and making other lifestyle changes can also help reduce symptoms. Follow-up care is a key part of your treatment and safety. Be sure to make and go to all appointments, and call your doctor if you are having problems. It's also a good idea to know your test results and keep a list of the medicines you take. How can you care for yourself at home? · Take your medicines exactly as prescribed. Call your doctor if you think you are having a problem with your medicine. · Your doctor may recommend over-the-counter medicine. For mild or occasional indigestion, antacids, such as Tums, Gaviscon, Mylanta, or Maalox, may help. Your doctor also may recommend over-the-counter acid reducers, such as Pepcid AC, Tagamet HB, Zantac 75, or Prilosec. Read and follow all instructions on the label. If you use these medicines often, talk with your doctor. · Change your eating habits. ? It's best to eat several small meals instead of two or three large meals. ? After you eat, wait 2 to 3 hours before you lie down. ? Chocolate, mint, and alcohol can make GERD worse. ? Spicy foods, foods that have a lot of acid (like tomatoes and oranges), and coffee can make GERD symptoms worse in some people. If your symptoms are worse after you eat a certain food, you may want to stop eating that food to see if your symptoms get better.   · Do not smoke or chew tobacco. Smoking can make GERD worse. If you need help quitting, talk to your doctor about stop-smoking programs and medicines. These can increase your chances of quitting for good. · If you have GERD symptoms at night, raise the head of your bed 6 to 8 inches by putting the frame on blocks or placing a foam wedge under the head of your mattress. (Adding extra pillows does not work.)  · Do not wear tight clothing around your middle. · Lose weight if you need to. Losing just 5 to 10 pounds can help. When should you call for help? Call your doctor now or seek immediate medical care if:    · You have new or different belly pain.     · Your stools are black and tarlike or have streaks of blood.    Watch closely for changes in your health, and be sure to contact your doctor if:    · Your symptoms have not improved after 2 days.     · Food seems to catch in your throat or chest.   Where can you learn more? Go to http://florecita-georgie.info/. Enter I092 in the search box to learn more about \"Gastroesophageal Reflux Disease (GERD): Care Instructions. \"  Current as of: March 27, 2018  Content Version: 11.9  © 9263-7473 [employer]. Care instructions adapted under license by [employer] (which disclaims liability or warranty for this information). If you have questions about a medical condition or this instruction, always ask your healthcare professional. [address] any warranty or liability for your use of this information.

## 2019-06-17 RX ORDER — AMLODIPINE BESYLATE 5 MG/1
5 TABLET ORAL DAILY
Qty: 90 TAB | Refills: 1 | Status: SHIPPED | OUTPATIENT
Start: 2019-06-17 | End: 2020-01-06

## 2019-06-18 RX ORDER — BUDESONIDE 0.5 MG/2ML
500 INHALANT ORAL 2 TIMES DAILY
Qty: 120 ML | Refills: 1 | Status: SHIPPED | OUTPATIENT
Start: 2019-06-18 | End: 2020-01-14 | Stop reason: ALTCHOICE

## 2019-06-19 ENCOUNTER — TELEPHONE (OUTPATIENT)
Dept: PULMONOLOGY | Age: 64
End: 2019-06-19

## 2019-06-19 NOTE — TELEPHONE ENCOUNTER
Per pt, her dry cough has returned. She says she will go into coughing fits and the only way to stop it is to lay completely still. What should she do? Does Dr. Blaine Ji want her to change any of her meds? Please call [phone].

## 2019-06-19 NOTE — TELEPHONE ENCOUNTER
Pt states the cough has returned. At visit this month sxs may be related to reflux. Pt has appt tomorrow with GI doctor. She will also keep cough lozenges near.

## 2019-07-22 ENCOUNTER — HOSPITAL ENCOUNTER (OUTPATIENT)
Dept: PREADMISSION TESTING | Age: 64
Discharge: HOME | End: 2019-07-22
Payer: COMMERCIAL

## 2019-07-22 ENCOUNTER — HOSPITAL ENCOUNTER (OUTPATIENT)
Dept: LAB | Age: 64
Discharge: HOME | End: 2019-07-22
Payer: COMMERCIAL

## 2019-07-22 LAB
ALBUMIN SERPL-MCNC: 4 G/DL (ref 3.4–5)
ALBUMIN SERPL-MCNC: 4.1 G/DL (ref 3.4–5)
ALBUMIN/GLOB SERPL: 1.4 {RATIO} (ref 0.8–1.7)
ALBUMIN/GLOB SERPL: 1.4 {RATIO} (ref 0.8–1.7)
ALP SERPL-CCNC: 69 U/L (ref 45–117)
ALP SERPL-CCNC: 71 U/L (ref 45–117)
ALT SERPL-CCNC: 17 U/L (ref 13–56)
ALT SERPL-CCNC: 18 U/L (ref 13–56)
ANION GAP SERPL CALC-SCNC: 7 MMOL/L (ref 3–18)
AST SERPL-CCNC: 14 U/L (ref 10–38)
AST SERPL-CCNC: 15 U/L (ref 10–38)
ATRIAL RATE: 58 BPM
BASOPHILS # BLD: 0 K/UL (ref 0–0.1)
BASOPHILS NFR BLD: 0 % (ref 0–2)
BILIRUB DIRECT SERPL-MCNC: 0.2 MG/DL (ref 0–0.2)
BILIRUB SERPL-MCNC: 0.7 MG/DL (ref 0.2–1)
BILIRUB SERPL-MCNC: 0.7 MG/DL (ref 0.2–1)
BUN SERPL-MCNC: 18 MG/DL (ref 7–18)
BUN/CREAT SERPL: 16 (ref 12–20)
CALCIUM SERPL-MCNC: 9.1 MG/DL (ref 8.5–10.1)
CALCULATED P AXIS, ECG09: 47 DEGREES
CALCULATED R AXIS, ECG10: 46 DEGREES
CALCULATED T AXIS, ECG11: 60 DEGREES
CHLORIDE SERPL-SCNC: 108 MMOL/L (ref 100–111)
CO2 SERPL-SCNC: 27 MMOL/L (ref 21–32)
CREAT SERPL-MCNC: 1.12 MG/DL (ref 0.6–1.3)
DIAGNOSIS, 93000: NORMAL
DIFFERENTIAL METHOD BLD: NORMAL
EOSINOPHIL # BLD: 0.2 K/UL (ref 0–0.4)
EOSINOPHIL NFR BLD: 2 % (ref 0–5)
ERYTHROCYTE [DISTWIDTH] IN BLOOD BY AUTOMATED COUNT: 12.5 % (ref 11.6–14.5)
GLOBULIN SER CALC-MCNC: 2.9 G/DL (ref 2–4)
GLOBULIN SER CALC-MCNC: 2.9 G/DL (ref 2–4)
GLUCOSE SERPL-MCNC: 95 MG/DL (ref 74–99)
HCT VFR BLD AUTO: 38.8 % (ref 35–45)
HGB BLD-MCNC: 13.3 G/DL (ref 12–16)
LIPASE SERPL-CCNC: 153 U/L (ref 73–393)
LYMPHOCYTES # BLD: 2.4 K/UL (ref 0.9–3.6)
LYMPHOCYTES NFR BLD: 28 % (ref 21–52)
MCH RBC QN AUTO: 30.9 PG (ref 24–34)
MCHC RBC AUTO-ENTMCNC: 34.3 G/DL (ref 31–37)
MCV RBC AUTO: 90.2 FL (ref 74–97)
MONOCYTES # BLD: 0.8 K/UL (ref 0.05–1.2)
MONOCYTES NFR BLD: 9 % (ref 3–10)
NEUTS SEG # BLD: 5.1 K/UL (ref 1.8–8)
NEUTS SEG NFR BLD: 61 % (ref 40–73)
P-R INTERVAL, ECG05: 214 MS
PLATELET # BLD AUTO: 228 K/UL (ref 135–420)
PMV BLD AUTO: 10.4 FL (ref 9.2–11.8)
POTASSIUM SERPL-SCNC: 3.6 MMOL/L (ref 3.5–5.5)
PROT SERPL-MCNC: 6.9 G/DL (ref 6.4–8.2)
PROT SERPL-MCNC: 7 G/DL (ref 6.4–8.2)
Q-T INTERVAL, ECG07: 430 MS
QRS DURATION, ECG06: 88 MS
QTC CALCULATION (BEZET), ECG08: 422 MS
RBC # BLD AUTO: 4.3 M/UL (ref 4.2–5.3)
SODIUM SERPL-SCNC: 142 MMOL/L (ref 136–145)
VENTRICULAR RATE, ECG03: 58 BPM
WBC # BLD AUTO: 8.4 K/UL (ref 4.6–13.2)

## 2019-07-22 PROCEDURE — 93005 ELECTROCARDIOGRAM TRACING: CPT

## 2019-07-22 PROCEDURE — 36415 COLL VENOUS BLD VENIPUNCTURE: CPT

## 2019-07-22 PROCEDURE — 80053 COMPREHEN METABOLIC PANEL: CPT

## 2019-07-22 PROCEDURE — 85025 COMPLETE CBC W/AUTO DIFF WBC: CPT

## 2019-07-22 PROCEDURE — 80076 HEPATIC FUNCTION PANEL: CPT

## 2019-07-22 PROCEDURE — 83690 ASSAY OF LIPASE: CPT

## 2019-08-11 RX ORDER — MONTELUKAST SODIUM 10 MG/1
10 TABLET ORAL
Qty: 30 TAB | Refills: 6 | Status: SHIPPED | OUTPATIENT
Start: 2019-08-11 | End: 2020-03-16 | Stop reason: SDUPTHER

## 2019-08-12 ENCOUNTER — ANESTHESIA EVENT (OUTPATIENT)
Dept: SURGERY | Age: 64
End: 2019-08-12
Payer: COMMERCIAL

## 2019-08-13 ENCOUNTER — HOSPITAL ENCOUNTER (OUTPATIENT)
Age: 64
Setting detail: OUTPATIENT SURGERY
Discharge: HOME | End: 2019-08-13
Attending: OBSTETRICS & GYNECOLOGY | Admitting: OBSTETRICS & GYNECOLOGY
Payer: COMMERCIAL

## 2019-08-13 ENCOUNTER — ANESTHESIA (OUTPATIENT)
Dept: SURGERY | Age: 64
End: 2019-08-13
Payer: COMMERCIAL

## 2019-08-13 VITALS
HEART RATE: 81 BPM | RESPIRATION RATE: 12 BRPM | WEIGHT: 203.13 LBS | HEIGHT: 66 IN | TEMPERATURE: 98.4 F | DIASTOLIC BLOOD PRESSURE: 81 MMHG | BODY MASS INDEX: 32.65 KG/M2 | SYSTOLIC BLOOD PRESSURE: 157 MMHG | OXYGEN SATURATION: 95 %

## 2019-08-13 DIAGNOSIS — G89.18 POST-OP PAIN: Primary | ICD-10-CM

## 2019-08-13 PROCEDURE — 77030012508 HC MSK AIRWY LMA AMBU -A: Performed by: ANESTHESIOLOGY

## 2019-08-13 PROCEDURE — 77030002996 HC SUT SLK J&J -A: Performed by: OBSTETRICS & GYNECOLOGY

## 2019-08-13 PROCEDURE — 74011000250 HC RX REV CODE- 250: Performed by: OBSTETRICS & GYNECOLOGY

## 2019-08-13 PROCEDURE — 76010000138 HC OR TIME 0.5 TO 1 HR: Performed by: OBSTETRICS & GYNECOLOGY

## 2019-08-13 PROCEDURE — 77030003029 HC SUT VCRL J&J -B: Performed by: OBSTETRICS & GYNECOLOGY

## 2019-08-13 PROCEDURE — 74011250636 HC RX REV CODE- 250/636

## 2019-08-13 PROCEDURE — 76210000021 HC REC RM PH II 0.5 TO 1 HR: Performed by: OBSTETRICS & GYNECOLOGY

## 2019-08-13 PROCEDURE — 74011250636 HC RX REV CODE- 250/636: Performed by: ANESTHESIOLOGY

## 2019-08-13 PROCEDURE — 88309 TISSUE EXAM BY PATHOLOGIST: CPT

## 2019-08-13 PROCEDURE — 74011250637 HC RX REV CODE- 250/637: Performed by: ANESTHESIOLOGY

## 2019-08-13 PROCEDURE — 77030018836 HC SOL IRR NACL ICUM -A: Performed by: OBSTETRICS & GYNECOLOGY

## 2019-08-13 PROCEDURE — 74011000258 HC RX REV CODE- 258: Performed by: ANESTHESIOLOGY

## 2019-08-13 PROCEDURE — 76060000032 HC ANESTHESIA 0.5 TO 1 HR: Performed by: OBSTETRICS & GYNECOLOGY

## 2019-08-13 PROCEDURE — 88307 TISSUE EXAM BY PATHOLOGIST: CPT

## 2019-08-13 PROCEDURE — 74011250636 HC RX REV CODE- 250/636: Performed by: OBSTETRICS & GYNECOLOGY

## 2019-08-13 PROCEDURE — 76210000016 HC OR PH I REC 1 TO 1.5 HR: Performed by: OBSTETRICS & GYNECOLOGY

## 2019-08-13 PROCEDURE — 74011000258 HC RX REV CODE- 258: Performed by: OBSTETRICS & GYNECOLOGY

## 2019-08-13 PROCEDURE — 77030020782 HC GWN BAIR PAWS FLX 3M -B: Performed by: OBSTETRICS & GYNECOLOGY

## 2019-08-13 PROCEDURE — 77030040361 HC SLV COMPR DVT MDII -B: Performed by: OBSTETRICS & GYNECOLOGY

## 2019-08-13 RX ORDER — OXYCODONE HYDROCHLORIDE 5 MG/1
5 TABLET ORAL
Status: DISCONTINUED | OUTPATIENT
Start: 2019-08-13 | End: 2019-08-13 | Stop reason: HOSPADM

## 2019-08-13 RX ORDER — HYDROMORPHONE HYDROCHLORIDE 2 MG/ML
0.5 INJECTION, SOLUTION INTRAMUSCULAR; INTRAVENOUS; SUBCUTANEOUS
Status: DISCONTINUED | OUTPATIENT
Start: 2019-08-13 | End: 2019-08-13 | Stop reason: HOSPADM

## 2019-08-13 RX ORDER — MIDAZOLAM HYDROCHLORIDE 1 MG/ML
INJECTION, SOLUTION INTRAMUSCULAR; INTRAVENOUS AS NEEDED
Status: DISCONTINUED | OUTPATIENT
Start: 2019-08-13 | End: 2019-08-13 | Stop reason: HOSPADM

## 2019-08-13 RX ORDER — FLUMAZENIL 0.1 MG/ML
0.2 INJECTION INTRAVENOUS
Status: DISCONTINUED | OUTPATIENT
Start: 2019-08-13 | End: 2019-08-13 | Stop reason: HOSPADM

## 2019-08-13 RX ORDER — ALBUTEROL SULFATE 0.83 MG/ML
2.5 SOLUTION RESPIRATORY (INHALATION) AS NEEDED
Status: DISCONTINUED | OUTPATIENT
Start: 2019-08-13 | End: 2019-08-13 | Stop reason: HOSPADM

## 2019-08-13 RX ORDER — PROPOFOL 10 MG/ML
INJECTION, EMULSION INTRAVENOUS AS NEEDED
Status: DISCONTINUED | OUTPATIENT
Start: 2019-08-13 | End: 2019-08-13 | Stop reason: HOSPADM

## 2019-08-13 RX ORDER — LOSARTAN POTASSIUM 25 MG/1
TABLET ORAL DAILY
COMMUNITY
End: 2020-01-28 | Stop reason: SDUPTHER

## 2019-08-13 RX ORDER — CEFOXITIN SODIUM 2 G/50ML
2 INJECTION, SOLUTION INTRAVENOUS ONCE
Status: COMPLETED | OUTPATIENT
Start: 2019-08-13 | End: 2019-08-13

## 2019-08-13 RX ORDER — ACETAMINOPHEN 10 MG/ML
1000 INJECTION, SOLUTION INTRAVENOUS ONCE
Status: COMPLETED | OUTPATIENT
Start: 2019-08-13 | End: 2019-08-13

## 2019-08-13 RX ORDER — NALOXONE HYDROCHLORIDE 0.4 MG/ML
0.2 INJECTION, SOLUTION INTRAMUSCULAR; INTRAVENOUS; SUBCUTANEOUS AS NEEDED
Status: DISCONTINUED | OUTPATIENT
Start: 2019-08-13 | End: 2019-08-13 | Stop reason: HOSPADM

## 2019-08-13 RX ORDER — OXYCODONE AND ACETAMINOPHEN 5; 325 MG/1; MG/1
1 TABLET ORAL
Qty: 30 TAB | Refills: 0 | Status: SHIPPED | OUTPATIENT
Start: 2019-08-13 | End: 2019-08-18

## 2019-08-13 RX ORDER — DEXAMETHASONE SODIUM PHOSPHATE 4 MG/ML
INJECTION, SOLUTION INTRA-ARTICULAR; INTRALESIONAL; INTRAMUSCULAR; INTRAVENOUS; SOFT TISSUE AS NEEDED
Status: DISCONTINUED | OUTPATIENT
Start: 2019-08-13 | End: 2019-08-13 | Stop reason: HOSPADM

## 2019-08-13 RX ORDER — SODIUM CHLORIDE, SODIUM LACTATE, POTASSIUM CHLORIDE, CALCIUM CHLORIDE 600; 310; 30; 20 MG/100ML; MG/100ML; MG/100ML; MG/100ML
125 INJECTION, SOLUTION INTRAVENOUS CONTINUOUS
Status: DISCONTINUED | OUTPATIENT
Start: 2019-08-13 | End: 2019-08-13 | Stop reason: HOSPADM

## 2019-08-13 RX ORDER — OXYCODONE AND ACETAMINOPHEN 5; 325 MG/1; MG/1
1 TABLET ORAL AS NEEDED
Status: DISCONTINUED | OUTPATIENT
Start: 2019-08-13 | End: 2019-08-13

## 2019-08-13 RX ORDER — FENTANYL CITRATE 50 UG/ML
INJECTION, SOLUTION INTRAMUSCULAR; INTRAVENOUS AS NEEDED
Status: DISCONTINUED | OUTPATIENT
Start: 2019-08-13 | End: 2019-08-13 | Stop reason: HOSPADM

## 2019-08-13 RX ORDER — SODIUM CHLORIDE, SODIUM LACTATE, POTASSIUM CHLORIDE, CALCIUM CHLORIDE 600; 310; 30; 20 MG/100ML; MG/100ML; MG/100ML; MG/100ML
1000 INJECTION, SOLUTION INTRAVENOUS CONTINUOUS
Status: DISCONTINUED | OUTPATIENT
Start: 2019-08-13 | End: 2019-08-13 | Stop reason: HOSPADM

## 2019-08-13 RX ORDER — ONDANSETRON 2 MG/ML
INJECTION INTRAMUSCULAR; INTRAVENOUS AS NEEDED
Status: DISCONTINUED | OUTPATIENT
Start: 2019-08-13 | End: 2019-08-13 | Stop reason: HOSPADM

## 2019-08-13 RX ORDER — DIPHENHYDRAMINE HYDROCHLORIDE 50 MG/ML
12.5 INJECTION, SOLUTION INTRAMUSCULAR; INTRAVENOUS
Status: DISCONTINUED | OUTPATIENT
Start: 2019-08-13 | End: 2019-08-13 | Stop reason: HOSPADM

## 2019-08-13 RX ORDER — ASPIRIN 81 MG/1
TABLET ORAL DAILY
COMMUNITY
End: 2020-01-28 | Stop reason: SDUPTHER

## 2019-08-13 RX ORDER — FENTANYL CITRATE 50 UG/ML
25 INJECTION, SOLUTION INTRAMUSCULAR; INTRAVENOUS AS NEEDED
Status: DISCONTINUED | OUTPATIENT
Start: 2019-08-13 | End: 2019-08-13 | Stop reason: HOSPADM

## 2019-08-13 RX ORDER — LIDOCAINE 50 MG/G
OINTMENT TOPICAL AS NEEDED
Status: DISCONTINUED | OUTPATIENT
Start: 2019-08-13 | End: 2019-08-13 | Stop reason: HOSPADM

## 2019-08-13 RX ADMIN — PROPOFOL 150 MG: 10 INJECTION, EMULSION INTRAVENOUS at 08:18

## 2019-08-13 RX ADMIN — CEFOXITIN SODIUM 2 G: 2 INJECTION, SOLUTION INTRAVENOUS at 08:14

## 2019-08-13 RX ADMIN — FENTANYL CITRATE 25 MCG: 50 INJECTION, SOLUTION INTRAMUSCULAR; INTRAVENOUS at 08:46

## 2019-08-13 RX ADMIN — ACETAMINOPHEN 1000 MG: 10 INJECTION, SOLUTION INTRAVENOUS at 08:30

## 2019-08-13 RX ADMIN — MIDAZOLAM HYDROCHLORIDE 2 MG: 1 INJECTION, SOLUTION INTRAMUSCULAR; INTRAVENOUS at 08:11

## 2019-08-13 RX ADMIN — SODIUM CHLORIDE, SODIUM LACTATE, POTASSIUM CHLORIDE, AND CALCIUM CHLORIDE 125 ML/HR: 600; 310; 30; 20 INJECTION, SOLUTION INTRAVENOUS at 09:16

## 2019-08-13 RX ADMIN — DEXAMETHASONE SODIUM PHOSPHATE 4 MG: 4 INJECTION, SOLUTION INTRA-ARTICULAR; INTRALESIONAL; INTRAMUSCULAR; INTRAVENOUS; SOFT TISSUE at 08:33

## 2019-08-13 RX ADMIN — FENTANYL CITRATE 25 MCG: 50 INJECTION, SOLUTION INTRAMUSCULAR; INTRAVENOUS at 08:13

## 2019-08-13 RX ADMIN — FENTANYL CITRATE 25 MCG: 50 INJECTION, SOLUTION INTRAMUSCULAR; INTRAVENOUS at 09:56

## 2019-08-13 RX ADMIN — OXYCODONE HYDROCHLORIDE 5 MG: 5 TABLET ORAL at 10:43

## 2019-08-13 RX ADMIN — PROMETHAZINE HYDROCHLORIDE 12.5 MG: 25 INJECTION, SOLUTION INTRAMUSCULAR; INTRAVENOUS at 09:43

## 2019-08-13 RX ADMIN — FENTANYL CITRATE 25 MCG: 50 INJECTION, SOLUTION INTRAMUSCULAR; INTRAVENOUS at 10:04

## 2019-08-13 RX ADMIN — FENTANYL CITRATE 25 MCG: 50 INJECTION, SOLUTION INTRAMUSCULAR; INTRAVENOUS at 08:17

## 2019-08-13 RX ADMIN — ONDANSETRON 4 MG: 2 INJECTION INTRAMUSCULAR; INTRAVENOUS at 08:54

## 2019-08-13 RX ADMIN — FENTANYL CITRATE 25 MCG: 50 INJECTION, SOLUTION INTRAMUSCULAR; INTRAVENOUS at 08:57

## 2019-08-13 RX ADMIN — SODIUM CHLORIDE, SODIUM LACTATE, POTASSIUM CHLORIDE, AND CALCIUM CHLORIDE 125 ML/HR: 600; 310; 30; 20 INJECTION, SOLUTION INTRAVENOUS at 06:52

## 2019-08-13 NOTE — ANESTHESIA POSTPROCEDURE EVALUATION
Post-Anesthesia Evaluation and Assessment    Cardiovascular Function/Vital Signs  Visit Vitals  /66   Pulse 66   Temp 37.1 °C (98.7 °F)   Resp 18   Ht 5' 5.5\" (1.664 m)   Wt 92.1 kg (203 lb 2 oz)   SpO2 98%   BMI 33.29 kg/m²       Patient is status post Procedure(s):  SIMPLE PARTIAL VULVECTOMY OF THE VAGINAL INTROITUS/PERINEAL BODY. Nausea/Vomiting: Controlled. Postoperative hydration reviewed and adequate. Pain:  Pain Scale 1: FLACC (08/13/19 1010)  Pain Intensity 1: 0 (08/13/19 1010)   Managed. Neurological Status:   Neuro (WDL): Exceptions to WDL (08/13/19 0912)   At baseline. Mental Status and Level of Consciousness: Baseline and appropriate for discharge. Pulmonary Status:   O2 Device: Nasal cannula (08/13/19 0912)   Adequate oxygenation and airway patent. Complications related to anesthesia: None    Post-anesthesia assessment completed. No concerns. Patient has met all discharge requirements.     Signed By: Anatoliy Sanchez MD    August 13, 2019

## 2019-08-13 NOTE — ANESTHESIA PREPROCEDURE EVALUATION
Relevant Problems   No relevant active problems       Anesthetic History     PONV          Review of Systems / Medical History      Pulmonary            Asthma : well controlled       Neuro/Psych   Within defined limits           Cardiovascular    Hypertension        Dysrhythmias   CAD    Exercise tolerance: >4 METS     GI/Hepatic/Renal     GERD: well controlled           Endo/Other      Hypothyroidism       Other Findings              Physical Exam    Airway  Mallampati: II  TM Distance: 4 - 6 cm  Neck ROM: normal range of motion   Mouth opening: Normal     Cardiovascular  Regular rate and rhythm,  S1 and S2 normal,  no murmur, click, rub, or gallop             Dental  No notable dental hx       Pulmonary  Breath sounds clear to auscultation               Abdominal  GI exam deferred       Other Findings            Anesthetic Plan    ASA: 3  Anesthesia type: general          Induction: Intravenous  Anesthetic plan and risks discussed with: Patient

## 2019-08-13 NOTE — BRIEF OP NOTE
BRIEF OPERATIVE NOTE    Date of Procedure: 8/13/2019   Preoperative Diagnosis: VULVAR INTRAEPITHELIAL NEOPLASIA III  Postoperative Diagnosis: VULVAR INTRAEPITHELIAL NEOPLASIA III    Procedure(s):  SIMPLE PARTIAL VULVECTOMY OF THE VAGINAL INTROITUS/PERINEAL BODY  Surgeon(s) and Role:     * Ministerio Lozoya MD - Primary         Surgical Assistant: Dawit Leo SA    Surgical Staff:  Circ-1: Debbie Rizvi RN  Scrub Tech-1: Alfredo Rasheed  Scrub Tech-2: Samantha Gaxiola  Float Staff: Lizzy Anaya RN  Event Time In Time Out   Incision Start 0831    Incision Close 8817      Anesthesia: General   Anesthesiologist: Chun Boyd MD  Estimated Blood Loss: 5cc  Specimens:   ID Type Source Tests Collected by Time Destination   1 : Introitus/perineal body Preservative VULVA  Adalgisa Gary MD 8/13/2019 0845 Pathology      Findings: dense AWE lesion on the perineal body extending to the vaginal introitus. Lesion crosses midline. Vulvar/vaginal anatomic changes consistent with prior episiotomy or vaginal laceration repair. Stage II rectocele.    Complications: none  Implants: none    Allison Ballesteros MD

## 2019-08-13 NOTE — OP NOTES
[facility]  OPERATIVE REPORT    Name:  Christie Byrne  MR#:   [id number]  :  1955  ACCOUNT #:  [de-identified]  DATE OF SERVICE:  2019    PREOPERATIVE DIAGNOSIS:  Vulvar intraepithelial neoplasia 3. POSTOPERATIVE DIAGNOSIS:  Vulvar intraepithelial neoplasia 3. PROCEDURE PERFORMED:  Simple partial vulvectomy of the vaginal introitus/perineal body. SURGEON:  Adalgisa Monaco MD    SURGICAL ASSISTANT:  SA KENTRELL Peña SURGICAL NURSE:  Adam Call RN    SURGICAL START TIME:  08:31. SURGICAL INCISION CLOSED:  At 09:03. ANESTHESIA:  General.    ANESTHESIOLOGIST:  Anastasiia Santana MD    COMPLICATIONS:  None. SPECIMENS REMOVED:  Introitus/perineal body pinned on foam for anatomic orientation and sent fresh. IMPLANTS:  None. ESTIMATED BLOOD LOSS:  5 mL. FINDINGS:  Vulvar and vaginal anatomic changes consistent with prior episiotomy or vaginal laceration repair. Stage 2 rectocele. Atrophic vulvar skin and vaginal mucosa. There is a dense acetowhite lesion measuring 2.5 x 1.5 cm on the perineal body extending to the vaginal introitus. The lesion crosses midline. The lesion does not extend to the anal verge. 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 The patient reports noticing irritation for a few months. She recently looked at the area and noticed an irregular raised area. The patient promptly obtained evaluation and was referred to myself for definitive diagnosis and treatment. The patient was seen in the office and examined. Office biopsy was offered, however, the patient declined office biopsy in view of an excisional procedure in the operating room.   Risks, benefits, and alternatives of surgery were reviewed with the patient, and the patient decided to proceed and written consent was obtained. PROCEDURE:  After the patient was accurately identified, consent was verified, signed on the chart and all questions were answered. The patient was taken to the operating room with IV running. She was placed in the supine position. Sequential compression devices were placed on the bilateral lower extremities and functioning prior to induction of anesthesia. A 2 g of Ancef was given intravenously as prophylactic antibiotics within one hour prior to incision. The patient underwent dosing of general anesthesia. She was placed in the low dorsal lithotomy position in Derinda Beans stirrups and prepped and draped in normal fashion using Betadine on the perineum and vagina. Sterile drapes were applied. The vagina was irrigated with saline. Perineum was irrigated with saline. A 5% acetic acid was applied to the entire vulva, posterior vagina, and perianal area. A comprehensive colposcopic evaluation was performed. Microhemangiomas were obvious on the labia majora. There was atrophy of the labia minora and majora as well as the vaginal mucosa. A 2.5 x 1.5 cm dense acetowhite epithelial lesion was present on the perineal body extending to the vaginal introitus. A marking pen was used to shakila the planned incision site, which included the lesion with a 1-cm visible normal margin circumferentially. Marcaine 0.5% with epinephrine was injected subcutaneously beneath the lesion as well as in the intended area of incision. A 15-blade scalpel was used to make an incision in the skin in an elliptical fashion around the lesion. The scalpel was used to incise the dermis to completely excise the lesion. The lesion was placed on foam in anatomic orientation and pinned and sent fresh to Pathology. Bovie cautery was used for hemostasis in the surgical bed. The 3-0 Vicryl sutures were used deep to reapproximate the deep dermis.   The 3-0 Vicryl sutures were used in an interrupted vertical mattress fashion to reapproximate the superficial skin. Once the incision was closed, 5% lidocaine ointment was applied to the incision and ice was applied as well. The patient was replaced in the supine position, awakened from anesthesia, transferred to the hospital bed, and transferred to the PACU awake, in stable condition. All sharp instruments and sponge counts were correct. There were no complications with this procedure.       Alex Handley MD      [id number]  D:  08/13/2019 9:35  T:  08/13/2019 12:10  JOB #:  [id number]

## 2019-08-13 NOTE — PERIOP NOTES
TRANSFER - IN REPORT:    Verbal report received from Dr. Yemi Hollingsworth, [address] [place] [address] nurse (name) on Yolette Magana  being received from OR (unit) for routine progression of care      Report consisted of patients Situation, Background, Assessment and   Recommendations(SBAR). Information from the following report(s) OR Summary, Procedure Summary, Intake/Output and MAR was reviewed with the receiving nurse. Opportunity for questions and clarification was provided. Assessment completed upon patients arrival to unit and care assumed.

## 2019-08-13 NOTE — DISCHARGE INSTRUCTIONS
DISCHARGE SUMMARY from Nurse    PATIENT INSTRUCTIONS:    After general anesthesia or intravenous sedation, for 24 hours or while taking prescription Narcotics:  · Limit your activities  · Do not drive and operate hazardous machinery  · Do not make important personal or business decisions  · Do  not drink alcoholic beverages  · If you have not urinated within 8 hours after discharge, please contact your surgeon on call. Report the following to your surgeon:  · Excessive pain, swelling, redness or odor of or around the surgical area  · Temperature over 100.5  · Nausea and vomiting lasting longer than 4 hours or if unable to take medications  · Any signs of decreased circulation or nerve impairment to extremity: change in color, persistent  numbness, tingling, coldness or increase pain  · Any questions    What to do at Home:  [facility],  [address]  PAT DRY DO NOT WIPE FORCEFULLY AFTER URINATING  NO HEAVY LIFTING  ICE TO PERINEUM  RETURN TO OFFICE IN 1 WEEK CALL FOR APPT    If you experience any of the following symptoms heavy bleeding, fevers, severe pain, please follow up with dr Yg Lewis    *  Please give a list of your current medications to your Primary Care Provider. *  Please update this list whenever your medications are discontinued, doses are      changed, or new medications (including over-the-counter products) are added. *  Please carry medication information at all times in case of emergency situations. These are general instructions for a healthy lifestyle:    No smoking/ No tobacco products/ Avoid exposure to second hand smoke  Surgeon General's Warning:  Quitting smoking now greatly reduces serious risk to your health.     Obesity, smoking, and sedentary lifestyle greatly increases your risk for illness    A healthy diet, regular physical exercise & weight monitoring are important for maintaining a healthy lifestyle    You may be retaining fluid if you have a history of heart failure or if you experience any of the following symptoms:  Weight gain of 3 pounds or more overnight or 5 pounds in a week, increased swelling in our hands or feet or shortness of breath while lying flat in bed. Please call your doctor as soon as you notice any of these symptoms; do not wait until your next office visit. The discharge information has been reviewed with the patient and caregiver. The patient and caregiver verbalized understanding. Discharge medications reviewed with the patient and caregiver and appropriate educational materials and side effects teaching were provided.   ___________________________________________________________________________________________________________________________________    Patient armband removed and shredded

## 2019-08-13 NOTE — INTERVAL H&P NOTE
H&P Update: 
Christopher Pina was seen and examined. History and physical has been reviewed. The patient has been examined. There have been no significant clinical changes since the completion of the originally dated History and Physical. 
 
Umm Carlson MD 
8:12 AM 
08/13/19

## 2019-10-07 RX ORDER — ICOSAPENT ETHYL 1000 MG/1
CAPSULE ORAL
Qty: 60 CAP | Refills: 6 | Status: SHIPPED | OUTPATIENT
Start: 2019-10-07 | End: 2020-01-28 | Stop reason: SDUPTHER

## 2019-10-22 ENCOUNTER — HOSPITAL ENCOUNTER (OUTPATIENT)
Dept: MAMMOGRAPHY | Age: 64
Discharge: HOME | End: 2019-10-22
Attending: OBSTETRICS & GYNECOLOGY
Payer: COMMERCIAL

## 2019-10-22 DIAGNOSIS — Z12.31 SCREENING MAMMOGRAM FOR HIGH-RISK PATIENT: ICD-10-CM

## 2019-10-22 PROCEDURE — 77063 BREAST TOMOSYNTHESIS BI: CPT

## 2020-01-06 RX ORDER — AMLODIPINE BESYLATE 5 MG/1
5 TABLET ORAL DAILY
Qty: 90 TAB | Refills: 1 | Status: SHIPPED | OUTPATIENT
Start: 2020-01-06 | End: 2020-01-28 | Stop reason: SDUPTHER

## 2020-01-14 ENCOUNTER — OFFICE VISIT (OUTPATIENT)
Dept: PULMONOLOGY | Age: 65
End: 2020-01-14

## 2020-01-14 VITALS
BODY MASS INDEX: 33.29 KG/M2 | RESPIRATION RATE: 18 BRPM | DIASTOLIC BLOOD PRESSURE: 76 MMHG | OXYGEN SATURATION: 96 % | TEMPERATURE: 98.2 F | HEART RATE: 64 BPM | HEIGHT: 66 IN | SYSTOLIC BLOOD PRESSURE: 134 MMHG

## 2020-01-14 DIAGNOSIS — I25.10 CORONARY ARTERY DISEASE INVOLVING NATIVE HEART, ANGINA PRESENCE UNSPECIFIED, UNSPECIFIED VESSEL OR LESION TYPE: ICD-10-CM

## 2020-01-14 DIAGNOSIS — E66.9 OBESITY (BMI 30-39.9): ICD-10-CM

## 2020-01-14 DIAGNOSIS — I10 HYPERTENSION, UNSPECIFIED TYPE: ICD-10-CM

## 2020-01-14 DIAGNOSIS — J45.909 ASTHMA, UNSPECIFIED ASTHMA SEVERITY, UNSPECIFIED WHETHER COMPLICATED, UNSPECIFIED WHETHER PERSISTENT: Primary | ICD-10-CM

## 2020-01-14 DIAGNOSIS — K21.9 GASTROESOPHAGEAL REFLUX DISEASE WITHOUT ESOPHAGITIS: ICD-10-CM

## 2020-01-14 RX ORDER — FLUTICASONE FUROATE AND VILANTEROL 200; 25 UG/1; UG/1
1 POWDER RESPIRATORY (INHALATION) DAILY
Qty: 2 INHALER | Refills: 0 | Status: SHIPPED | COMMUNITY
Start: 2020-01-14 | End: 2020-03-16 | Stop reason: SDUPTHER

## 2020-01-27 RX ORDER — METOPROLOL SUCCINATE 50 MG/1
TABLET, EXTENDED RELEASE ORAL
Qty: 10 TAB | Refills: 0 | Status: SHIPPED | OUTPATIENT
Start: 2020-01-27 | End: 2020-01-28 | Stop reason: SDUPTHER

## 2020-01-27 NOTE — TELEPHONE ENCOUNTER
Metoprolol only giving 2 tabs until seen tomorrow ( won't wait until tomorrow to get it filled) she is passed due to be seen /  [facility]

## 2020-01-28 ENCOUNTER — OFFICE VISIT (OUTPATIENT)
Dept: CARDIOLOGY CLINIC | Age: 65
End: 2020-01-28

## 2020-01-28 VITALS
HEART RATE: 78 BPM | SYSTOLIC BLOOD PRESSURE: 118 MMHG | BODY MASS INDEX: 33.29 KG/M2 | DIASTOLIC BLOOD PRESSURE: 69 MMHG | HEIGHT: 66 IN | OXYGEN SATURATION: 96 %

## 2020-01-28 DIAGNOSIS — I10 ESSENTIAL HYPERTENSION: ICD-10-CM

## 2020-01-28 DIAGNOSIS — I25.118 CORONARY ARTERY DISEASE OF NATIVE ARTERY OF NATIVE HEART WITH STABLE ANGINA PECTORIS (HCC): Primary | ICD-10-CM

## 2020-01-28 DIAGNOSIS — R00.2 PALPITATIONS: ICD-10-CM

## 2020-01-28 DIAGNOSIS — Z95.5 PRESENCE OF STENT IN LAD CORONARY ARTERY: ICD-10-CM

## 2020-01-28 DIAGNOSIS — E78.5 HYPERLIPIDEMIA, UNSPECIFIED HYPERLIPIDEMIA TYPE: ICD-10-CM

## 2020-01-28 RX ORDER — ICOSAPENT ETHYL 1000 MG/1
1 CAPSULE ORAL 2 TIMES DAILY WITH MEALS
Qty: 180 CAP | Refills: 3 | Status: SHIPPED | OUTPATIENT
Start: 2020-01-28 | End: 2022-06-03

## 2020-01-28 RX ORDER — AMLODIPINE BESYLATE 5 MG/1
5 TABLET ORAL DAILY
Qty: 90 TAB | Refills: 1 | Status: SHIPPED | OUTPATIENT
Start: 2020-01-28

## 2020-01-28 RX ORDER — METOPROLOL SUCCINATE 50 MG/1
TABLET, EXTENDED RELEASE ORAL
Qty: 10 TAB | Refills: 0 | Status: SHIPPED | OUTPATIENT
Start: 2020-01-28 | End: 2020-08-03 | Stop reason: SDUPTHER

## 2020-01-28 RX ORDER — LOSARTAN POTASSIUM 25 MG/1
25 TABLET ORAL DAILY
Qty: 90 TAB | Refills: 2 | Status: SHIPPED | OUTPATIENT
Start: 2020-01-28 | End: 2022-06-03

## 2020-01-28 RX ORDER — ASPIRIN 81 MG/1
81 TABLET ORAL DAILY
Qty: 90 TAB | Refills: 3 | Status: SHIPPED | OUTPATIENT
Start: 2020-01-28

## 2020-01-28 RX ORDER — SIMVASTATIN 40 MG/1
40 TABLET, FILM COATED ORAL
Qty: 90 TAB | Refills: 2 | Status: SHIPPED | OUTPATIENT
Start: 2020-01-28 | End: 2021-03-18

## 2020-01-28 RX ORDER — FENOFIBRATE 48 MG/1
48 TABLET, COATED ORAL DAILY
Qty: 90 TAB | Refills: 2 | Status: SHIPPED | OUTPATIENT
Start: 2020-01-28 | End: 2022-06-03

## 2020-01-28 RX ORDER — CLOPIDOGREL BISULFATE 75 MG/1
75 TABLET ORAL DAILY
Qty: 90 TAB | Refills: 2 | Status: SHIPPED | OUTPATIENT
Start: 2020-01-28 | End: 2022-06-03

## 2020-01-28 RX ORDER — NITROGLYCERIN 0.4 MG/1
0.4 TABLET SUBLINGUAL
Qty: 25 TAB | Refills: 1 | Status: SHIPPED | OUTPATIENT
Start: 2020-01-28

## 2020-01-28 NOTE — PROGRESS NOTES
Patient didn't bring medications, verbally reviewed. 1. Have you been to the ER, urgent care clinic since your last visit? Hospitalized since your last visit? Yes When: 12/25/2019 Where: ER [place], [place] Reason for visit: Respiratory Infection    2. Have you seen or consulted any other health care providers outside of the [address] [place] [address] since your last visit? Include any pap smears or colon screening.  No

## 2020-01-28 NOTE — PROGRESS NOTES
HISTORY OF PRESENT ILLNESS  Lamont Pierson is a 71 y.o. female. Hypertension   The history is provided by the patient. This is a chronic problem. The current episode started more than 1 week ago. Associated symptoms include shortness of breath. Pertinent negatives include no chest pain. Shortness of Breath   The history is provided by the patient. This is a chronic problem. The problem occurs intermittently. The current episode started more than 1 week ago. The problem has not changed since onset. Pertinent negatives include no ear pain, no neck pain, no wheezing, no chest pain, no syncope, no rash and no leg swelling. She has had prior hospitalizations. She has had prior ED visits. Associated medical issues include CAD. Associated medical issues do not include heart failure. Palpitations    The history is provided by the patient. This is a recurrent problem. The problem occurs every several days. Associated symptoms include shortness of breath. Pertinent negatives include no chest pain and no syncope. Her past medical history is significant for hypertension. Review of Systems   Constitutional: Negative for chills and weight loss. HENT: Negative for congestion, ear discharge, ear pain, hearing loss, nosebleeds and tinnitus. Eyes: Negative for blurred vision. Respiratory: Positive for shortness of breath. Negative for wheezing and stridor. Cardiovascular: Positive for palpitations. Negative for chest pain, leg swelling and syncope. Gastrointestinal: Negative for heartburn. Musculoskeletal: Negative for myalgias and neck pain. Skin: Negative for itching and rash. Neurological: Negative for tingling, tremors, focal weakness and loss of consciousness. Psychiatric/Behavioral: Negative for depression and suicidal ideas.      Family History   Problem Relation Age of Onset    Cancer Mother     Heart Disease Father     Hypertension Father     Elevated Lipids Father     Diabetes Neg Hx     Stroke Neg Hx        Past Medical History:   Diagnosis Date    Asthma     CAD (coronary artery disease) 1998    \"stent in LAD\"    Cancer ([facility] [address]     squamous cell skin cancer on neck    First degree AV block     Gastroesophageal reflux disease without esophagitis 6/3/2019    GERD (gastroesophageal reflux disease)     History of ovarian cancer     HTN (hypertension)     Hyperlipidemia     Hypothyroidism     IC (interstitial cystitis)     Nausea & vomiting     Nausea    Pleural effusion 01/2019    Pneumonia 01/2019       Past Surgical History:   Procedure Laterality Date    CARDIAC SURG PROCEDURE UNLIST  2016 & 2018    HX APPENDECTOMY      HX CHOLECYSTECTOMY      HX CORONARY STENT PLACEMENT  2007    HX CORONARY STENT PLACEMENT      HX HYSTERECTOMY      HX TUBAL LIGATION Bilateral        Social History     Tobacco Use    Smoking status: Never Smoker    Smokeless tobacco: Never Used   Substance Use Topics    Alcohol use: Yes     Frequency: Monthly or less     Drinks per session: 1 or 2     Binge frequency: Never     Comment: 2 glasses of wine monthly       Allergies   Allergen Reactions    Latex, Natural Rubber Rash    Adhesive Rash    Macrobid [Nitrofurantoin Monohyd/M-Cryst] Nausea and Vomiting    Nsaids (Non-Steroidal Anti-Inflammatory Drug) Other (comments)     Can not take secondary to current plavix and asa use. Outpatient Medications Marked as Taking for the 1/28/20 encounter (Office Visit) with Hallie Granados MD   Medication Sig Dispense Refill    metoprolol succinate (TOPROL-XL) 50 mg XL tablet TAKE 1 TABLET BY MOUTH EVERY DAY 10 Tab 0    fluticasone furoate-vilanterol (BREO ELLIPTA) 200-25 mcg/dose inhaler Take 1 Puff by inhalation daily. 2 Inhaler 0    amLODIPine (NORVASC) 5 mg tablet TAKE 1 TAB BY MOUTH DAILY.  90 Tab 1    clopidogrel (PLAVIX) 75 mg tab TAKE 1 TABLET BY MOUTH EVERY DAY 90 Tab 1    VASCEPA 1 gram capsule TAKE 1 CAPSULE BY MOUTH TWICE A DAY EVERY DAY WITH MEALS 60 Cap 6    aspirin delayed-release 81 mg tablet Take  by mouth daily.  losartan (COZAAR) 25 mg tablet Take  by mouth daily.  montelukast (SINGULAIR) 10 mg tablet Take 1 Tab by mouth nightly. 30 Tab 6    levothyroxine (SYNTHROID) 25 mcg tablet Take 12.5 mcg by mouth Daily (before breakfast).  zolpidem (AMBIEN) 10 mg tablet Take 10 mg by mouth nightly as needed for Sleep.  simvastatin (ZOCOR) 40 mg tablet TAKE 1 TAB BY MOUTH NIGHTLY. 30 Tab 3    fenofibrate nanocrystallized (TRICOR) 48 mg tablet Take 1 Tab by mouth daily. (Patient taking differently: Take 48 mg by mouth nightly.) 90 Tab 2    diazePAM (VALIUM) 10 mg tablet TAKE 1 TABLET BY MOUTH EVERY DAY AS NEEDED  4    albuterol (PROVENTIL HFA, VENTOLIN HFA, PROAIR HFA) 90 mcg/actuation inhaler Take 1 Puff by inhalation every six (6) hours as needed for Wheezing. 1 Inhaler 3    ondansetron (ZOFRAN ODT) 4 mg disintegrating tablet Take 1 Tab by mouth every eight (8) hours as needed for Nausea. 14 Tab 0    traZODone (DESYREL) 100 mg tablet Take 100 mg by mouth nightly as needed.  nitroglycerin (NITROSTAT) 0.4 mg SL tablet by SubLINGual route every five (5) minutes as needed for Chest Pain.  phenazopyridine (PYRIDIUM) 100 mg tablet Take 100 mg by mouth as needed. Visit Vitals  /69 (BP 1 Location: Left arm, BP Patient Position: Sitting)   Pulse 78   Ht 5' 5.5\" (1.664 m)   SpO2 96%   BMI 33.29 kg/m²     Physical Exam   Constitutional: She is oriented to person, place, and time. She appears well-developed and well-nourished. No distress. HENT:   Head: Atraumatic. Mouth/Throat: No oropharyngeal exudate. Eyes: Conjunctivae are normal. Right eye exhibits no discharge. Left eye exhibits no discharge. No scleral icterus. Neck: Normal range of motion. Neck supple. No JVD present. No tracheal deviation present. No thyromegaly present. Cardiovascular: Normal rate, regular rhythm and normal heart sounds.  Exam reveals no gallop. No murmur heard. Pulmonary/Chest: Effort normal and breath sounds normal. No stridor. No respiratory distress. She has no wheezes. She has no rales. She exhibits no tenderness. Abdominal: Soft. There is no abdominal tenderness. There is no rebound. Musculoskeletal: Normal range of motion. General: No tenderness or edema. Lymphadenopathy:     She has no cervical adenopathy. Neurological: She is alert and oriented to person, place, and time. She exhibits normal muscle tone. Skin: Skin is warm. She is not diaphoretic. Psychiatric: She has a normal mood and affect. Her behavior is normal.     ekg sinus rhythm with t wave inversion in anterior and inferior leads. Lexiscan 10/2017:  CONCLUSION:  1. Normal perfusion scan. 2. Normal wall motion and ejection fraction. 02/20/19   ECHO ADULT COMPLETE 02/20/2019 2/20/2019    Narrative · Estimated left ventricular ejection fraction is 61 - 65%. Left   ventricular mild concentric hypertrophy. Normal left ventricular wall   motion, no regional wall motion abnormality noted. Mild (grade 1) left   ventricular diastolic dysfunction. · Mild mitral annular calcification. Trace mitral valve regurgitation. · Mild aortic valve sclerosis. Mild aortic valve regurgitation is present. · Trace tricuspid valve regurgitation. Pulmonary arterial systolic   pressure is 58.5 mmHg. There is no evidence of pulmonary hypertension. · Mild pulmonic valve regurgitation is present. Signed by: Brittani Love MD     ASSESSMENT and PLAN    ICD-10-CM ICD-9-CM    1. Coronary artery disease of native artery of native heart with stable angina pectoris (HCC) I25.118 414.01 AMB POC EKG ROUTINE W/ 12 LEADS, INTER & REP     413.9    2. Presence of stent in LAD coronary artery Z95.5 V45.82    3. Essential hypertension I10 401.9    4. Hyperlipidemia, unspecified hyperlipidemia type E78.5 272.4    5.  Palpitations R00.2 785.1 CARDIAC EVENT MONITOR Orders Placed This Encounter    AMB POC EKG ROUTINE W/ 12 LEADS, INTER & REP     Order Specific Question:   Reason for Exam:     Answer:   chest pain       cardiovascular risk and specific lipid/LDL goals reviewed  use of aspirin to prevent MI and TIA's discussed. Patient with history of CAD status post PCI to mid LAD followed by recent PTCA for in-stent stenosis. Reports episodes of palpitations. Thinks that she has atrial fibrillation based on her Apple Watch tracings. Unable to confirm rhythm at this time. EKG today shows sinus rhythm. We will obtain cardiac monitor for 30 days and consider anticoagulation as needed. Meanwhile we will obtain lipid panel. Follow-up post test results.

## 2020-02-05 NOTE — PROGRESS NOTES
[facility]  Pulmonary, Critical Care, and Sleep Medicine      Pulmonary Office Progress Notes    Name: Seven Stroud     : 1955     Date: 2020        Subjective:     2020      Seven Stroud  is a 71 y.o. female with PMH of asthma and pneumonia who presents for routine follow up visit. She was last seen here on 6/3/19 by Dr. Cass Toussaint for evaluation of persistent cough which occurred during episodes of reflux. Her symptoms of SOB / wheezing were well controlled with Breo and albuterol PRN. Today she appears comfortable, in no distress and reports that she has not had any recent exacerbation of asthmatic symptoms. She denies cough, wheezing, chest pain or hemoptysis. No fever, chills or orthopnea; no leg / calf pain or swelling and no decreased appetite or weight loss. No reports of nasal congestion / drainage or sinus pressure /pain. Her last PFTs on  demonstrated mild obstructive defect. She has no history of smoking. Past Medical History:   Diagnosis Date    Asthma     CAD (coronary artery disease)     \"stent in LAD\"    Cancer ([facility] [address]     squamous cell skin cancer on neck    First degree AV block     Gastroesophageal reflux disease without esophagitis 6/3/2019    GERD (gastroesophageal reflux disease)     History of ovarian cancer     HTN (hypertension)     Hyperlipidemia     Hypothyroidism     IC (interstitial cystitis)     Nausea & vomiting     Nausea    Pleural effusion 2019    Pneumonia 2019       Allergies   Allergen Reactions    Latex, Natural Rubber Rash    Adhesive Rash    Macrobid [Nitrofurantoin Monohyd/M-Cryst] Nausea and Vomiting    Nsaids (Non-Steroidal Anti-Inflammatory Drug) Other (comments)     Can not take secondary to current plavix and asa use.        Current Outpatient Medications   Medication Sig Dispense Refill    fluticasone furoate-vilanterol (BREO ELLIPTA) 200-25 mcg/dose inhaler Take 1 Puff by inhalation daily. 2 Inhaler 0    montelukast (SINGULAIR) 10 mg tablet Take 1 Tab by mouth nightly. 30 Tab 6    levothyroxine (SYNTHROID) 25 mcg tablet Take 12.5 mcg by mouth Daily (before breakfast).  albuterol (PROVENTIL HFA, VENTOLIN HFA, PROAIR HFA) 90 mcg/actuation inhaler Take 1 Puff by inhalation every six (6) hours as needed for Wheezing. 1 Inhaler 3    traZODone (DESYREL) 100 mg tablet Take 100 mg by mouth nightly as needed.  phenazopyridine (PYRIDIUM) 100 mg tablet Take 100 mg by mouth as needed.  amLODIPine (NORVASC) 5 mg tablet Take 1 Tab by mouth daily. 90 Tab 1    aspirin delayed-release 81 mg tablet Take 1 Tab by mouth daily. 90 Tab 3    clopidogreL (PLAVIX) 75 mg tab Take 1 Tab by mouth daily. 90 Tab 2    fenofibrate nanocrystallized (TRICOR) 48 mg tablet Take 1 Tab by mouth daily. 90 Tab 2    losartan (COZAAR) 25 mg tablet Take 1 Tab by mouth daily. 90 Tab 2    metoprolol succinate (TOPROL-XL) 50 mg XL tablet TAKE 1 TABLET BY MOUTH EVERY DAY 10 Tab 0    nitroglycerin (NITROSTAT) 0.4 mg SL tablet 1 Tab by SubLINGual route every five (5) minutes as needed for Chest Pain. 25 Tab 1    simvastatin (ZOCOR) 40 mg tablet Take 1 Tab by mouth nightly. 90 Tab 2    icosapent ethyL (VASCEPA) 1 gram capsule Take 1 Cap by mouth two (2) times daily (with meals). 180 Cap 3    zolpidem (AMBIEN) 10 mg tablet Take 10 mg by mouth nightly as needed for Sleep.  diazePAM (VALIUM) 10 mg tablet TAKE 1 TABLET BY MOUTH EVERY DAY AS NEEDED  4    ondansetron (ZOFRAN ODT) 4 mg disintegrating tablet Take 1 Tab by mouth every eight (8) hours as needed for Nausea.  14 Tab 0       Review of Systems:    HEENT: No epistaxis, no nasal drainage, no difficulty in swallowing, no redness in eyes  Respiratory: As stated above in HPI  Cardiovascular: no chest pain, no palpitations, no chronic leg edema, no syncope  Gastrointestinal: no abd pain, no vomiting, no diarrhea, no bleeding symptoms  Genitourinary: No urinary symptoms or hematuria  Integument/breast: No ulcers or rashes  Musculoskeletal: No leg / calf pain  Neurological: No focal weakness, no seizures, no headaches  Behvioral/Psych: No anxiety, no depression  Constitutional: No fever, chills or night sweats. No decreased appetite or weight loss     Objective:     Visit Vitals  /76 (BP 1 Location: Right arm, BP Patient Position: Sitting)   Pulse 64   Temp 98.2 °F (36.8 °C) (Oral)   Resp 18   Ht 5' 5.5\" (1.664 m)   SpO2 96% Comment: RA Rest   BMI 33.29 kg/m²        PHYSICAL EXAM      General: Oriented to person, place, and time. Well-developed, well-nourished, and in no distress. Obese      Head:   Normocephalic, without obvious abnormality, atraumatic       Eyes:   Pupils reactive, conjunctivae / corneas clear. EOM's intact, no scleral icterus       Nose:   Nares normal, no drainage. Throat:    Lips, mucosa and tongue normal. Teeth and gums normal       Neck:   Supple, symmetrical, trachea midline. No adenopathy or thyroid swelling; no carotid bruit or JVD. CVS:    Regular rate and rhythm. S1S2 normal,  no murmurs       RS:      Symmetrical chest rise, good AE bilaterally. Lung sounds clear to auscultation bilaterally. No wheezing, rales or rhonchi, no accessory muscle use      Abd:     Soft, non-tender.   No hepatosplenomegaly                                                     Neuro:   non focal, awake, alert and oriented to person, place, time and situation    Extrm:   no leg edema,  clubbing or cyanosis       Skin:   no rash    Data review:     Hospital Outpatient Visit on 07/22/2019   Component Date Value Ref Range Status    Protein, total 07/22/2019 6.9  6.4 - 8.2 g/dL Final    Albumin 07/22/2019 4.0  3.4 - 5.0 g/dL Final    Globulin 07/22/2019 2.9  2.0 - 4.0 g/dL Final    A-G Ratio 07/22/2019 1.4  0.8 - 1.7   Final    Bilirubin, total 07/22/2019 0.7  0.2 - 1.0 MG/DL Final    Bilirubin, direct 07/22/2019 0.2  0.0 - 0.2 MG/DL Final    Alk. phosphatase 07/22/2019 71  45 - 117 U/L Final    AST (SGOT) 07/22/2019 14  10 - 38 U/L Final    PLEASE NOTE NEW REFERENCE RANGE    ALT (SGPT) 07/22/2019 17  13 - 56 U/L Final    Lipase 07/22/2019 153  73 - 393 U/L Final   Hospital Outpatient Visit on 07/22/2019   Component Date Value Ref Range Status    Ventricular Rate 07/22/2019 58  BPM Final    Atrial Rate 07/22/2019 58  BPM Final    P-R Interval 07/22/2019 214  ms Final    QRS Duration 07/22/2019 88  ms Final    Q-T Interval 07/22/2019 430  ms Final    QTC Calculation (Bezet) 07/22/2019 422  ms Final    Calculated P Axis 07/22/2019 47  degrees Final    Calculated R Axis 07/22/2019 46  degrees Final    Calculated T Axis 07/22/2019 60  degrees Final    Diagnosis 07/22/2019    Final                    Value:Sinus bradycardia with 1st degree AV block  Otherwise normal ECG  Confirmed by Sage Silver MD, [facility] ([id number]) on 7/22/2019 8:48:03 PM      WBC 07/22/2019 8.4  4.6 - 13.2 K/uL Final    RBC 07/22/2019 4.30  4.20 - 5.30 M/uL Final    HGB 07/22/2019 13.3  12.0 - 16.0 g/dL Final    HCT 07/22/2019 38.8  35.0 - 45.0 % Final    MCV 07/22/2019 90.2  74.0 - 97.0 FL Final    MCH 07/22/2019 30.9  24.0 - 34.0 PG Final    MCHC 07/22/2019 34.3  31.0 - 37.0 g/dL Final    RDW 07/22/2019 12.5  11.6 - 14.5 % Final    PLATELET 28/15/3656 805  135 - 420 K/uL Final    MPV 07/22/2019 10.4  9.2 - 11.8 FL Final    NEUTROPHILS 07/22/2019 61  40 - 73 % Final    LYMPHOCYTES 07/22/2019 28  21 - 52 % Final    MONOCYTES 07/22/2019 9  3 - 10 % Final    EOSINOPHILS 07/22/2019 2  0 - 5 % Final    BASOPHILS 07/22/2019 0  0 - 2 % Final    ABS. NEUTROPHILS 07/22/2019 5.1  1.8 - 8.0 K/UL Final    ABS. LYMPHOCYTES 07/22/2019 2.4  0.9 - 3.6 K/UL Final    ABS. MONOCYTES 07/22/2019 0.8  0.05 - 1.2 K/UL Final    ABS. EOSINOPHILS 07/22/2019 0.2  0.0 - 0.4 K/UL Final    ABS.  BASOPHILS 07/22/2019 0.0  0.0 - 0.1 K/UL Final    DF 07/22/2019 AUTOMATED    Final    Sodium 07/22/2019 142  136 - 145 mmol/L Final    Potassium 07/22/2019 3.6  3.5 - 5.5 mmol/L Final    Chloride 07/22/2019 108  100 - 111 mmol/L Final    PLEASE NOTE NEW REFERENCE RANGE    CO2 07/22/2019 27  21 - 32 mmol/L Final    Anion gap 07/22/2019 7  3.0 - 18 mmol/L Final    Glucose 07/22/2019 95  74 - 99 mg/dL Final    BUN 07/22/2019 18  7.0 - 18 MG/DL Final    Creatinine 07/22/2019 1.12  0.6 - 1.3 MG/DL Final    BUN/Creatinine ratio 07/22/2019 16  12 - 20   Final    GFR est AA 07/22/2019 59* >60 ml/min/1.73m2 Final    GFR est non-AA 07/22/2019 49* >60 ml/min/1.73m2 Final    Comment: (NOTE)  Estimated GFR is calculated using the Modification of Diet in Renal   Disease (MDRD) Study equation, reported for both  Americans   (GFRAA) and non- Americans (GFRNA), and normalized to 1.73m2   body surface area. The physician must decide which value applies to   the patient. The MDRD study equation should only be used in   individuals age 25 or older. It has not been validated for the   following: pregnant women, patients with serious comorbid conditions,   or on certain medications, or persons with extremes of body size,   muscle mass, or nutritional status.  Calcium 07/22/2019 9.1  8.5 - 10.1 MG/DL Final    Bilirubin, total 07/22/2019 0.7  0.2 - 1.0 MG/DL Final    ALT (SGPT) 07/22/2019 18  13 - 56 U/L Final    AST (SGOT) 07/22/2019 15  10 - 38 U/L Final    PLEASE NOTE NEW REFERENCE RANGE    Alk.  phosphatase 07/22/2019 69  45 - 117 U/L Final    Protein, total 07/22/2019 7.0  6.4 - 8.2 g/dL Final    Albumin 07/22/2019 4.1  3.4 - 5.0 g/dL Final    Globulin 07/22/2019 2.9  2.0 - 4.0 g/dL Final    A-G Ratio 07/22/2019 1.4  0.8 - 1.7   Final                   PULMONARY FUNCTION TESTS    Date FVC FEV1  FEV1/FVC NCP77-25 TLC RV RV/TLC VC DLCO   2/25/19 84 78 72 57                                                  Imaging:  I have reviewed all of the available data including the patient's previous history external records and radiological imaging available for review. In addition, applicable cardiology and other lab data were also reviewed. XR Results (most recent):  Results from Appointment encounter on 02/25/19   XR CHEST PA LAT    Narrative Examination: PA and lateral chest     History: Pleural effusion    Comparison: October 9, 2017    Findings: The lungs are grossly clear. The heart is no acute osseous  abnormality. Impression Impression:  1. No acute process       CT Results (most recent):  Results from Hospital Encounter encounter on 09/13/16   CT MAXILLOFACIAL WO CONT    Narrative FACIAL CT WITHOUT CONTRAST WITH RECONSTRUCTIONS    CPT CODE: 59618    HISTORY: Trip and fall injury several hours PTA; right-sided head/facial pain. COMPARISON: No prior imaging. TECHNIQUE: Helical 2.5 mm axial images acquired through the facial region. Sagittal and coronal reformations generated. CT scans at this facility are  performed using dose optimization technique as appropriate with performed exam,  to include automated exposure control, adjustment of mA and/or kV according to  patient's size (including appropriate matching for site-specific examinations),  or use of iterative reconstruction technique. FINDINGS:   Mild right superior lateral periorbital soft tissue swelling. No foreign body. Facial bones are intact. Orbital structures are intact. Minimal mucosal thickening inferior left maxillary sinus. The paranasal sinuses  are otherwise clear. Mandible intact. Mild to moderate degenerative changes upper cervical spine. Impression IMPRESSION:    1. Mild right periorbital soft tissue contusions. No facial fracture.        Thank you for this referral.          Patient Active Problem List   Diagnosis Code    Asthma J45.909    Hypertension I10    CAD (coronary artery disease) I25.10    Hyperlipidemia E78.5    IC (interstitial cystitis) N30.10    Presence of stent in LAD coronary artery Z95.5    Acute coronary syndrome (HCC) I24.9    ACS (acute coronary syndrome) ([facility]) I24.9    LV dysfunction I51.9    Atypical chest pain R07.89    Coronary artery disease of bypass graft of native heart with stable angina pectoris ([facility]) I25.708    Gastroesophageal reflux disease without esophagitis K21.9       IMPRESSION:   · Asthma-at baseline mild intermittent currently moderate persistent  · GERD   · Coronary artery disease  · Hypertension  · Obesity - BMI 33.29      RECOMMENDATIONS:     · Continue to optimize treatment for airway inflammation  · Continue Breo Ellipta, one inhalation twice daily and albuterol PRN  · Continue Singulair 10 mg daily  · Adequate ambient air humidification advised   · IgE level -normal  · GERD instructions and precautions follow-up with GI. · We will follow-up in 6 months and sooner should there be any change                Please note that this dictation was completed with [employer], the computer voice recognition software. Quite often unanticipated grammatical, syntax, homophones, and other interpretive errors are inadvertently transcribed by the computer software. Please disregard these errors. Please excuse any errors that have escaped final proofreading.               jB Alicea NP

## 2020-03-16 ENCOUNTER — TELEPHONE (OUTPATIENT)
Dept: PULMONOLOGY | Age: 65
End: 2020-03-16

## 2020-03-16 DIAGNOSIS — J45.909 ASTHMA, UNSPECIFIED ASTHMA SEVERITY, UNSPECIFIED WHETHER COMPLICATED, UNSPECIFIED WHETHER PERSISTENT: Primary | Chronic | ICD-10-CM

## 2020-03-16 RX ORDER — MONTELUKAST SODIUM 10 MG/1
10 TABLET ORAL
Qty: 90 TAB | Refills: 1 | Status: SHIPPED | OUTPATIENT
Start: 2020-03-16

## 2020-03-16 RX ORDER — FLUTICASONE FUROATE AND VILANTEROL TRIFENATATE 200; 25 UG/1; UG/1
1 POWDER RESPIRATORY (INHALATION) DAILY
Qty: 3 INHALER | Refills: 1 | Status: SHIPPED | OUTPATIENT
Start: 2020-03-16 | End: 2021-06-24 | Stop reason: ALTCHOICE

## 2020-04-16 DIAGNOSIS — R00.2 PALPITATIONS: ICD-10-CM

## 2020-06-15 ENCOUNTER — OFFICE VISIT (OUTPATIENT)
Dept: ORTHOPEDIC SURGERY | Age: 65
End: 2020-06-15

## 2020-06-15 VITALS
TEMPERATURE: 97.5 F | HEART RATE: 55 BPM | OXYGEN SATURATION: 97 % | SYSTOLIC BLOOD PRESSURE: 147 MMHG | BODY MASS INDEX: 33.29 KG/M2 | DIASTOLIC BLOOD PRESSURE: 86 MMHG | RESPIRATION RATE: 16 BRPM | HEIGHT: 66 IN

## 2020-06-15 DIAGNOSIS — M79.672 LEFT FOOT PAIN: ICD-10-CM

## 2020-06-15 DIAGNOSIS — G62.9 NEUROPATHY: ICD-10-CM

## 2020-06-15 DIAGNOSIS — B35.1 FUNGAL INFECTION OF TOENAIL: ICD-10-CM

## 2020-06-15 DIAGNOSIS — M21.619 BUNION OF GREAT TOE: Primary | ICD-10-CM

## 2020-06-15 RX ORDER — CICLOPIROX 80 MG/ML
1 SOLUTION TOPICAL
Qty: 2 BOTTLE | Refills: 0 | Status: SHIPPED | OUTPATIENT
Start: 2020-06-15 | End: 2022-06-03

## 2020-06-15 NOTE — PROGRESS NOTES
AMBULATORY PROGRESS NOTE      Patient: Eleazar Yepez             MRN: [id number]     SSN: [id number] Body mass index is 33.29 kg/m². YOB: 1955     AGE: 71 y.o. SEX: female    PCP: None     IMPRESSION/DIAGNOSIS AND TREATMENT PLAN     DIAGNOSES  1. Bunion of great toe    2. Neuropathy    3. Left foot pain    4. Fungal infection of toenail        Orders Placed This Encounter    AMB POC XRAY, FOOT; COMPLETE, 3+ VIEW    ciclopirox (Penlac) 8 % solution      Eleazar Yepez understands her diagnoses and the proposed plan. Patient has chronic severe bunion deformity of the left she does have some limitation of the left great toe first MTP, and has increased metatarsal DERRICK angle. She does have neuropathy, idiopathic neuropathy affecting her bilateral lower extremities. She has seen a neurologist, in the past, for this. In this individual, is having disabling pain discomfort in her left forefoot, due to her bunion deformities, as well as due to her hammertoes 234 on the left, recommendation, is for surgical intervention. She is tried shoe wear changes active modification for many years still having disabling discomfort now having disabling discomfort, to her left forefoot. I think she can require left first ray medial column stabilization, a Lapidus type procedure to decrease the DERRICK angle to improve concentricity distally. She required distal bunionectomy procedure, this was a Akin closing wedge osteotomy. She may also require left #2 3 and 4 PIP hammertoe resection arthroplasties as well. This will entail a prolonged recovery, least 3 to 6 months to heal.  Least 3 weeks to heal the incision should begin non removable splint and then short cam walker boot thereafter anticipate, or possibly cast patient would need to be protected, for least 3 to 6 months in my opinion. There is a risk for wound complications, risk for risk for recurrence of deformity.     She does have decreased monofilament testing, to the left foot ankle left distal tib-fib region, as well as her right foot and ankle regions as well. I think she will need some robust fixation, across the first TMT region, which may consist of plates and screws I anticipate. Cardiac surgery, she may require vitamin D level routine preop data. She was given copies of her x-rays. Surgery I anticipate will be performed at [facility]. Plan:    1) Contact Rylie Rojo for surgical scheduling, [phone]  2) Anticipate left great toe bunionectomy if condition does not improve. 3) I discussed with the pt about wearing comfortable shoes. 4) Penlac 8 %, 2 bottles, apply once daily to the affected area. 5) Referral to Dr. Behzad Betancur for left and right great toenail plate care (fungal infection) (discussed)      RTO -     HPI AND EXAMINATION     Eleazar Yepez IS A 71 y.o. female who presents to my outpatient office complaining of intermittent left foot bunion pain. She has been experiencing this pain for a while, but notes that the great toe has started to cross over into her #2 toe causing more significant pain. On a typical day, she finds herself sometimes walking on the balls of her feet rather than her toes due to the pain. She notes that she often feels the bunion pain at night. She occasionally wears    She has also noticed lifting of her great toenails plates due to possible fungal infection. Regarding her right foot, she has slight bunion that gives her some discomfort but nothing in comparison to her left bunion. Of note, the pt has Idiopathic neuropathy in left foot, but denies any hx of DM. Visit Vitals  /86   Pulse (!) 55   Temp 97.5 °F (36.4 °C) (Oral)   Resp 16   Ht 5' 5.5\" (1.664 m)   SpO2 97%   BMI 33.29 kg/m²       Appearance: Alert, well appearing and pleasant patient who is in no distress, oriented to person, place/time, and who follows commands. This patient is accompanied in the examination room by her self. Dementia: no dementia  Psychiatric: Affect and mood are appropriate. Patient arrives to office via: without assistive device:   HEENT: Head normocephalic & atraumatic. Both pupils are round, non icteric sclera   Eye: EOM are intact    Neck: ROM WNL      Hearings Intact   Respiratory: Breathing is unlabored without accessory chest muscle use  Cardiovascular/Peripheral Vascular: Normal Pulses to each foot    ANKLE/FOOT left    Gait: Normal  Tenderness: Moderate tenderness to the 2,3,4 metatarsal heads    NT to great toe with grind test  Cutaneous: severe bunion of great toe; hammering of #2,3, 4 toes; increased splaying across   first TMT joint. pronation of left great toe, toe abutment    Fungal infection of great toenail plate (L>R)  pronation of left great toe, toe abutment  Joint Motion:WNL. Joint / Tendon Stability: No Ankle or Subtalar instability or joint laxity. No peroneal sublux ability or dislocation  Alignment: Forefoot, Midfoot, Hindfoot WNL. Neuro Motor/Sensory: NL/NL. Some Diminished sensations across hindfoot with microfilament test  Vascular: NL foot/ankle pulses. Lymphatics: No extremity lymphedema, No calf swelling, no tenderness to calf muscles. ANKLE/FOOT right  Gait: Normal  Tenderness: No tenderness . Cutaneous: Fungal infection of great toenail plate (L>R)  Joint Motion: WNL. Joint / Tendon Stability: No Ankle or Subtalar instability or joint laxity. No peroneal sublux ability or dislocation  Alignment: Forefoot, Midfoot, Hindfoot WNL. Neuro Motor/Sensory: NL/NL. Vascular: NL foot/ankle pulses. Lymphatics: No extremity lymphedema, No calf swelling, no tenderness to calf muscles. CHART REVIEW        She is the wife of Dr. Irish Hardy an emergency room physician here at [facility]. She is here having had EMG nerve conduction studies of her bilateral lower extremities.   These were done by Dr. Rayo Joseph of [place]. These studies were actually done on July 27, 2016. The findings revealed abnormal studies to the EMG showing it to be reduction in the sensory action potential of both sural nerves, left worse than right, minimal reduction in the compound motor action potential seen in both peroneal nerves. These findings are consistent with mild early sensory, worse that motor, axonal neuropathy. THE  FOR Angie Michael  WAS REVIEWED BY Yoselin Hernandez MD 6/15/2020 . Past Medical History:   Diagnosis Date    Asthma     CAD (coronary artery disease) 1998    \"stent in LAD\"    Cancer ([facility] [address]     squamous cell skin cancer on neck    First degree AV block     Gastroesophageal reflux disease without esophagitis 6/3/2019    GERD (gastroesophageal reflux disease)     History of ovarian cancer     HTN (hypertension)     Hyperlipidemia     Hypothyroidism     IC (interstitial cystitis)     Nausea & vomiting     Nausea    Pleural effusion 01/2019    Pneumonia 01/2019     Current Outpatient Medications   Medication Sig    ciclopirox (Penlac) 8 % solution Apply 1 Each to affected area nightly.  fluticasone furoate-vilanteroL (Breo Ellipta) 200-25 mcg/dose inhaler Take 1 Puff by inhalation daily.  montelukast (SINGULAIR) 10 mg tablet Take 1 Tab by mouth nightly.  amLODIPine (NORVASC) 5 mg tablet Take 1 Tab by mouth daily.  aspirin delayed-release 81 mg tablet Take 1 Tab by mouth daily.  clopidogreL (PLAVIX) 75 mg tab Take 1 Tab by mouth daily.  fenofibrate nanocrystallized (TRICOR) 48 mg tablet Take 1 Tab by mouth daily.  losartan (COZAAR) 25 mg tablet Take 1 Tab by mouth daily.  metoprolol succinate (TOPROL-XL) 50 mg XL tablet TAKE 1 TABLET BY MOUTH EVERY DAY    nitroglycerin (NITROSTAT) 0.4 mg SL tablet 1 Tab by SubLINGual route every five (5) minutes as needed for Chest Pain.  simvastatin (ZOCOR) 40 mg tablet Take 1 Tab by mouth nightly.     icosapent ethyL (VASCEPA) 1 gram capsule Take 1 Cap by mouth two (2) times daily (with meals).  levothyroxine (SYNTHROID) 25 mcg tablet Take 12.5 mcg by mouth Daily (before breakfast).  zolpidem (AMBIEN) 10 mg tablet Take 10 mg by mouth nightly as needed for Sleep.  diazePAM (VALIUM) 10 mg tablet TAKE 1 TABLET BY MOUTH EVERY DAY AS NEEDED    albuterol (PROVENTIL HFA, VENTOLIN HFA, PROAIR HFA) 90 mcg/actuation inhaler Take 1 Puff by inhalation every six (6) hours as needed for Wheezing.  ondansetron (ZOFRAN ODT) 4 mg disintegrating tablet Take 1 Tab by mouth every eight (8) hours as needed for Nausea.  traZODone (DESYREL) 100 mg tablet Take 100 mg by mouth nightly as needed.  phenazopyridine (PYRIDIUM) 100 mg tablet Take 100 mg by mouth as needed. No current facility-administered medications for this visit. Allergies   Allergen Reactions    Latex, Natural Rubber Rash    Adhesive Rash    Macrobid [Nitrofurantoin Monohyd/M-Cryst] Nausea and Vomiting    Nsaids (Non-Steroidal Anti-Inflammatory Drug) Other (comments)     Can not take secondary to current plavix and asa use.      Past Surgical History:   Procedure Laterality Date    CARDIAC SURG PROCEDURE UNLIST  2016 & 2018    HX APPENDECTOMY      HX CHOLECYSTECTOMY      HX CORONARY STENT PLACEMENT  2007    HX CORONARY STENT PLACEMENT      HX HYSTERECTOMY      HX TUBAL LIGATION Bilateral      Social History     Occupational History    Not on file   Tobacco Use    Smoking status: Never Smoker    Smokeless tobacco: Never Used   Substance and Sexual Activity    Alcohol use: Yes     Frequency: Monthly or less     Drinks per session: 1 or 2     Binge frequency: Never     Comment: 2 glasses of wine monthly    Drug use: Never    Sexual activity: Yes     Family History   Problem Relation Age of Onset    Cancer Mother     Heart Disease Father     Hypertension Father     Elevated Lipids Father     Diabetes Neg Hx     Stroke Neg Hx         REVIEW OF SYSTEMS : 6/15/2020  ALL BELOW ARE Negative except : SEE HPI         REVIEW OF SYSTEMS : Total of 12 systems reviewed as follows:          CONSTITUTIONAL: No weight loss. PSYCHOLOGICAL : No Feelings of anxiety, depression, agitation  EYES: No blurred vision and no eye discharge. NO eye pain, double vision  ENT: No nasal discharge. No ear pain. CARDIOVASCULAR: No chest pain and no diaphoresis. RESPIRATORY: No cough, no hemoptysis. GI: No vomiting, no diarrhea   : No urinary frequency and no dysuria. MUSCULOSKELETAL: see HPI  SKIN: No rashes. NEURO:  No dizziness,weakness, headaches// No visual changes or confusion, or seizures,   ENDOCRINE: No polyphagia and no polydipsia. HEMATOLOGY: No bleeding tendencies. DIAGNOSTIC IMAGING     FOOT X RAYS 3 VIEWS Left   6/15/2020    WEIGHT BEARING    X RAYS AT [address] [place] [address]  6/15/2020      Bones: No fractures or dislocations. No focal osteolytic or osteoblastic process    Bone Spurs: No significant bone spurs   Alignment: Bunion Alignment: WNL    severe Hallux Valgus Alignment, severe increased IM ANGLE (1st/2nd)   Metatarsus Adductus is mild   Midfoot Alignment is WNL. Joint:  Degenerative changes, seen across the first TMT region, I see no significant degenerative changes, seen across the great toe first MTP with her first great toe first MTP, is subluxed about 40% in the lateral direction. Soft Tissues: Soft Tissues No abnormal calcific densities to soft tissues     No ankle joint effusion in lateral projection. Mineralization: Suggests Osteopenia    I have personally reviewed the results of the above study. The interpretation of this study is my professional opinion    Niesha Hale MD  6/15/2020  1:46 PM           .          Please see above section of this report. I have personally reviewed the results of the above study. The interpretation of this study is my professional opinion.     Written by Sridevi Nieto as dictated by Dr. Hector Muhammad. I, Dr. Hector Muhammad, confirm that all documentation is accurate.

## 2020-06-15 NOTE — PROGRESS NOTES
1. Have you been to the ER, urgent care clinic since your last visit? Hospitalized since your last visit? No    2. Have you seen or consulted any other health care providers outside of the [address] [place] [address] since your last visit? Include any pap smears or colon screening.  No

## 2020-08-03 RX ORDER — METOPROLOL SUCCINATE 50 MG/1
TABLET, EXTENDED RELEASE ORAL
Qty: 90 TAB | Refills: 3 | Status: SHIPPED | OUTPATIENT
Start: 2020-08-03

## 2020-08-12 DIAGNOSIS — M21.612 BUNION OF LEFT FOOT: Primary | ICD-10-CM

## 2020-08-12 DIAGNOSIS — Z01.818 PRE-OP EXAMINATION: ICD-10-CM

## 2020-08-12 NOTE — PROGRESS NOTES
The following pre-operative labs and diagnostic studies have been ordered:    Orders Placed This Encounter    CULTURE, URINE     Standing Status:   Future     Standing Expiration Date:   8/12/2021    XR CHEST PA LAT     Standing Status:   Future     Standing Expiration Date:   2/14/2021     Scheduling Instructions:      Please recheck to confirm if:      1. Patient has Allergry to IV contrast dye      2. Patient is pregnant     Order Specific Question:   Reason for Exam     Answer:   Preop Risk stratificatiion    CBC WITH AUTOMATED DIFF     Standing Status:   Future     Standing Expiration Date:   2/76/6605    METABOLIC PANEL, COMPREHENSIVE     Standing Status:   Future     Standing Expiration Date:   8/12/2021    PTT     Standing Status:   Future     Standing Expiration Date:   8/12/2021    URINALYSIS W/ RFLX MICROSCOPIC     Standing Status:   Future     Standing Expiration Date:   8/12/2021    VITAMIN D, 25 HYDROXY     Standing Status:   Future     Standing Expiration Date:   8/12/2021    NOVEL CORONAVIRUS (COVID-19)     Standing Status:   Future     Standing Expiration Date:   8/12/2021     Scheduling Instructions:      1) Due to current limited availability of the COVID-19 PCR test, tests will be prioritized and may not be completed.              2) Order only if the test result will change clinical management or necessary for a return to mission-critical employment decision.              3) Print and instruct patient to adhere to CDC home isolation program. (Link Above)              4) Set up or refer patient for a monitoring program.              5) Have patient sign up for and leverage MyChart (if not previously done). Order Specific Question:   Is this test for diagnosis or screening? Answer:   Screening     Order Specific Question:   Symptomatic for COVID-19 as defined by CDC? Answer:   No     Order Specific Question:   Hospitalized for COVID-19?      Answer:   No     Order Specific Question:   Admitted to ICU for COVID-19? Answer:   No     Order Specific Question:   Employed in healthcare setting? Answer:   Unknown     Order Specific Question:   Resident in a congregate (group) care setting? Answer:   Unknown     Order Specific Question:   Pregnant? Answer:   No     Order Specific Question:   Previously tested for COVID-19?      Answer:   Unknown    EKG, 12 LEAD, SUBSEQUENT     Standing Status:   Future     Standing Expiration Date:   2/12/2021     Order Specific Question:   Reason for Exam:     Answer:   Pre op         Junaid De La Paz PA-C  8/12/2020  7:55 PM

## 2020-08-17 ENCOUNTER — HOSPITAL ENCOUNTER (OUTPATIENT)
Dept: GENERAL RADIOLOGY | Age: 65
Discharge: HOME | End: 2020-08-17
Payer: COMMERCIAL

## 2020-08-17 ENCOUNTER — HOSPITAL ENCOUNTER (OUTPATIENT)
Dept: PREADMISSION TESTING | Age: 65
Discharge: HOME | End: 2020-08-17
Payer: COMMERCIAL

## 2020-08-17 DIAGNOSIS — M21.612 BUNION OF LEFT FOOT: ICD-10-CM

## 2020-08-17 DIAGNOSIS — Z01.818 PRE-OP EXAMINATION: ICD-10-CM

## 2020-08-17 LAB
25(OH)D3 SERPL-MCNC: 40.7 NG/ML (ref 30–100)
ALBUMIN SERPL-MCNC: 3.8 G/DL (ref 3.4–5)
ALBUMIN/GLOB SERPL: 1.3 {RATIO} (ref 0.8–1.7)
ALP SERPL-CCNC: 66 U/L (ref 45–117)
ALT SERPL-CCNC: 20 U/L (ref 13–56)
ANION GAP SERPL CALC-SCNC: 4 MMOL/L (ref 3–18)
APPEARANCE UR: CLEAR
APTT PPP: 28.6 SEC (ref 23–36.4)
AST SERPL-CCNC: 23 U/L (ref 10–38)
ATRIAL RATE: 75 BPM
BACTERIA URNS QL MICRO: ABNORMAL /HPF
BASOPHILS # BLD: 0 K/UL (ref 0–0.1)
BASOPHILS NFR BLD: 0 % (ref 0–2)
BILIRUB SERPL-MCNC: 0.5 MG/DL (ref 0.2–1)
BILIRUB UR QL: NEGATIVE
BUN SERPL-MCNC: 20 MG/DL (ref 7–18)
BUN/CREAT SERPL: 24 (ref 12–20)
CALCIUM SERPL-MCNC: 9 MG/DL (ref 8.5–10.1)
CALCULATED R AXIS, ECG10: 38 DEGREES
CALCULATED T AXIS, ECG11: 47 DEGREES
CAOX CRY URNS QL MICRO: ABNORMAL
CHLORIDE SERPL-SCNC: 107 MMOL/L (ref 100–111)
CO2 SERPL-SCNC: 29 MMOL/L (ref 21–32)
COLOR UR: ABNORMAL
CREAT SERPL-MCNC: 0.82 MG/DL (ref 0.6–1.3)
DIAGNOSIS, 93000: NORMAL
DIFFERENTIAL METHOD BLD: NORMAL
EOSINOPHIL # BLD: 0.2 K/UL (ref 0–0.4)
EOSINOPHIL NFR BLD: 2 % (ref 0–5)
EPITH CASTS URNS QL MICRO: ABNORMAL /LPF (ref 0–5)
ERYTHROCYTE [DISTWIDTH] IN BLOOD BY AUTOMATED COUNT: 12.6 % (ref 11.6–14.5)
GLOBULIN SER CALC-MCNC: 3 G/DL (ref 2–4)
GLUCOSE SERPL-MCNC: 86 MG/DL (ref 74–99)
GLUCOSE UR STRIP.AUTO-MCNC: NEGATIVE MG/DL
HCT VFR BLD AUTO: 40.8 % (ref 35–45)
HGB BLD-MCNC: 13.8 G/DL (ref 12–16)
HGB UR QL STRIP: NEGATIVE
KETONES UR QL STRIP.AUTO: ABNORMAL MG/DL
LEUKOCYTE ESTERASE UR QL STRIP.AUTO: ABNORMAL
LYMPHOCYTES # BLD: 2.5 K/UL (ref 0.9–3.6)
LYMPHOCYTES NFR BLD: 31 % (ref 21–52)
MCH RBC QN AUTO: 31.7 PG (ref 24–34)
MCHC RBC AUTO-ENTMCNC: 33.8 G/DL (ref 31–37)
MCV RBC AUTO: 93.8 FL (ref 74–97)
MONOCYTES # BLD: 0.7 K/UL (ref 0.05–1.2)
MONOCYTES NFR BLD: 9 % (ref 3–10)
MUCOUS THREADS URNS QL MICRO: ABNORMAL /LPF
NEUTS SEG # BLD: 4.6 K/UL (ref 1.8–8)
NEUTS SEG NFR BLD: 58 % (ref 40–73)
NITRITE UR QL STRIP.AUTO: POSITIVE
PH UR STRIP: 5.5 [PH] (ref 5–8)
PLATELET # BLD AUTO: 258 K/UL (ref 135–420)
PMV BLD AUTO: 11.3 FL (ref 9.2–11.8)
POTASSIUM SERPL-SCNC: 3.7 MMOL/L (ref 3.5–5.5)
PROT SERPL-MCNC: 6.8 G/DL (ref 6.4–8.2)
PROT UR STRIP-MCNC: NEGATIVE MG/DL
Q-T INTERVAL, ECG07: 478 MS
QRS DURATION, ECG06: 92 MS
QTC CALCULATION (BEZET), ECG08: 444 MS
RBC # BLD AUTO: 4.35 M/UL (ref 4.2–5.3)
RBC #/AREA URNS HPF: ABNORMAL /HPF (ref 0–5)
SODIUM SERPL-SCNC: 140 MMOL/L (ref 136–145)
SP GR UR REFRACTOMETRY: 1.02 (ref 1–1.03)
UROBILINOGEN UR QL STRIP.AUTO: 1 EU/DL (ref 0.2–1)
VENTRICULAR RATE, ECG03: 52 BPM
WBC # BLD AUTO: 8 K/UL (ref 4.6–13.2)
WBC URNS QL MICRO: ABNORMAL /HPF (ref 0–4)

## 2020-08-17 PROCEDURE — 82306 VITAMIN D 25 HYDROXY: CPT

## 2020-08-17 PROCEDURE — 71046 X-RAY EXAM CHEST 2 VIEWS: CPT

## 2020-08-17 PROCEDURE — 87086 URINE CULTURE/COLONY COUNT: CPT

## 2020-08-17 PROCEDURE — 93005 ELECTROCARDIOGRAM TRACING: CPT

## 2020-08-17 PROCEDURE — 36415 COLL VENOUS BLD VENIPUNCTURE: CPT

## 2020-08-17 PROCEDURE — 85730 THROMBOPLASTIN TIME PARTIAL: CPT

## 2020-08-17 PROCEDURE — 81001 URINALYSIS AUTO W/SCOPE: CPT

## 2020-08-17 PROCEDURE — 85025 COMPLETE CBC W/AUTO DIFF WBC: CPT

## 2020-08-17 PROCEDURE — 87077 CULTURE AEROBIC IDENTIFY: CPT

## 2020-08-17 PROCEDURE — 80053 COMPREHEN METABOLIC PANEL: CPT

## 2020-08-17 PROCEDURE — 87635 SARS-COV-2 COVID-19 AMP PRB: CPT

## 2020-08-17 PROCEDURE — 87186 SC STD MICRODIL/AGAR DIL: CPT

## 2020-08-18 LAB — SARS-COV-2, COV2NT: NOT DETECTED

## 2020-08-19 ENCOUNTER — OFFICE VISIT (OUTPATIENT)
Dept: ORTHOPEDIC SURGERY | Age: 65
End: 2020-08-19

## 2020-08-19 DIAGNOSIS — G62.9 NEUROPATHY: ICD-10-CM

## 2020-08-19 DIAGNOSIS — M21.619 BUNION OF GREAT TOE: Primary | ICD-10-CM

## 2020-08-19 DIAGNOSIS — M79.672 LEFT FOOT PAIN: ICD-10-CM

## 2020-08-19 DIAGNOSIS — Z01.818 PRE-OPERATIVE EXAMINATION: ICD-10-CM

## 2020-08-19 NOTE — PATIENT INSTRUCTIONS
Dr. Tre Blanc Pre-operative Instructions: 
 
 
Patient: Angel Portillo :  1955 I understand I am to stop taking oral birth control pills, hormonal replacement therapy and all Aspirin, Aspirin containing medications, Non-Steroidal Anti-Inflammatory medications (such as Advil, Aleve, Motrin, Ibuprofen) and or Blood thinner medication such as Coumadin, Plavix, Heparin or others 5 days prior to surgery. I understand I am to STOP taking these Medications 5 days prior to surgery: 
I am to get instructions from my prescribing physician. 1. __As listed above_______________________ 2. _____________________________________ 3. _____________________________________ 4. _____________________________________ I understand that if I am taking daily medications for high blood pressure, I can take them the morning of surgery with a small sip of water. I will consult my prescribing physician or call [facility] with specific questions. I also understand that: ? I am to report important observations or changes that may occur prior to surgery. If I have any changes in my physical condition, such as a rash, a fever, sore throat, abscess, ulcers, nausea, vomiting, or diarrhea. I am to call the office and I am to consult my primary care physician to assess and treat the problem. ? I am not to eat or drink anything after midnight the night before my surgery. ? I am not to drink alcoholic beverages 24 hours prior to surgery. ? I am not to do any illegal drugs prior to surgery. ? I am not to smoke at least 24 hours prior to surgery. ? I am able and will shower or bathe before surgery. I will use the Hibiclens solution on my surgical site only. The hibiclens directions are one packet a day starting two days before surgery. ? I will remove any nail polish, make-up or jewelry prior to arriving for my surgery. ? If I wear glasses, contact lenses or dentures they must be removed prior to going to the operating room. ? All body piercing and artifical eye-lashes must be removed prior to surgery ? I will not wear any aerosol sprays, perfumes or skin creams. ? I am to make arrangements for a family member or friend to accompany me to surgery and take me home after my surgery as I will not be allowed to leave the hospital alone. A cab or bus will not be acceptable. Please make arrange for someone to stay with you for 24 hours after surgery. ? Patient has expressed understanding of the diagnosis, treatment and planned surgery Post operative medications will be e-scribed to your pharmacy on record if possible Acute Pain After Surgery: Care Instructions Your Care Instructions It's common to have some pain after surgery. Pain doesn't mean that something is wrong or that the surgery didn't go well. But when the pain is severe, it's important to work with your doctor to manage it. It's also important to be aware of a few facts about pain and pain medicine. · You are the only person who knows what your pain feels like. So be sure to tell your doctor when you are in pain or when the pain changes. Then he or she will know how to adjust your medicines. · Pain is often easier to control right after it starts. So it may be better to take regular doses of pain medicine and not wait until the pain gets bad. · Medicine can help control pain. But this doesn't mean you'll have no pain. Medicine works to keep the pain at a level you can live with. With time, you will feel better. Follow-up care is a key part of your treatment and safety. Be sure to make and go to all appointments, and call your doctor if you are having problems. It's also a good idea to know your test results and keep a list of the medicines you take. How can you care for yourself at home? · Be safe with medicines. Read and follow all instructions on the label. ¨ If the doctor gave you a prescription medicine for pain, take it as prescribed. ¨ If you are not taking a prescription pain medicine, ask your doctor if you can take an over-the-counter medicine. · If you take an over-the-counter pain medicine, such as acetaminophen (Tylenol), ibuprofen (Advil, Motrin), or naproxen (Aleve), read and follow all instructions on the label. · Do not take two or more pain medicines at the same time unless the doctor told you to. · Do not drink alcohol while you are taking pain medicines. · Try to walk each day if your doctor recommends it. Start by walking a little more than you did the day before. Bit by bit, increase the amount you walk. Walking increases blood flow. It also helps prevent pneumonia and constipation. · To prevent constipation from opioid pain medicines: ¨ Talk to your doctor about a laxative. ¨ Include fruits, vegetables, beans, and whole grains in your diet each day. These foods are high in fiber. ¨ Drink plenty of fluids, enough so that your urine is light yellow or clear like water. Drink water, fruit juice, or other drinks that do not contain caffeine or alcohol. If you have kidney, heart, or liver disease and have to limit fluids, talk with your doctor before you increase the amount of fluids you drink. ¨ Take a fiber supplement, such as Citrucel or Metamucil, every day if needed. Read and follow all instructions on the label. If you take pain medicine for more than a few days, talk to your doctor before you take fiber. When should you call for help? Call your doctor now or seek immediate medical care if: 
 · Your pain gets worse. · Your pain is not controlled by medicine. Watch closely for changes in your health, and be sure to contact your doctor if you have any problems.

## 2020-08-20 LAB
BACTERIA SPEC CULT: ABNORMAL
CC UR VC: ABNORMAL
SERVICE CMNT-IMP: ABNORMAL

## 2020-08-21 ENCOUNTER — TELEPHONE (OUTPATIENT)
Dept: CARDIOLOGY CLINIC | Age: 65
End: 2020-08-21

## 2020-08-21 NOTE — TELEPHONE ENCOUNTER
Can you please call patient about EKG done on 8/17/20. Patient is scheduled for surgery and would like to discuss EKG.

## 2020-09-10 NOTE — H&P
FOOT AND ANKLE HISTORY AND PHYSICAL        Patient: Frederick Fields                   MRN: [id number]         SSN: [id number]  YOB: 1955                     AGE: 76 y.o. SEX: female      Patient scheduled for:  Left lapidus procedure, Richard procedure; second, third, fourth toe hammertoe proximal interphalangeal joint resection arthroplasty  Date of surgery: 9/18/20   Location of Surgery: [facility]   Surgeon: Luis Glass. MD Julian  ANESTHESIA TYPE:  General,  Popliteal block          PRESCRIPTIONS AND/OR ORDERS PROVIDED DURING H&P:    Orders Placed This Encounter    Generic Supply Order     Crutches    Generic Supply Order     Shower chair  Toilet riser  Bedside commode    promethazine (PHENERGAN) 25 mg tablet     Sig: Take 1 Tab by mouth every six (6) hours as needed for Nausea. Dispense:  30 Tab     Refill:  0    polyethylene glycol (Miralax) 17 gram packet     Sig: Take 1 Packet by mouth daily. Dispense:  10 Packet     Refill:  1    HYDROcodone-acetaminophen (NORCO) 7.5-325 mg per tablet     Sig: Take 1-2 Tabs by mouth every eight (8) hours as needed for Pain for up to 7 days. Max Daily Amount: 6 Tabs. Dispense:  42 Tab     Refill:  0         Post Operative Prescriptions have been e-scribed during the H&P           HISTORY:     The patient was seen in the office today for a preoperative history and physical for an upcoming above listed surgery. The patient is a pleasant 76 y.o. female who has a history of intermittent left > right foot bunion pain. She has been experiencing this pain for a while, but notes that the great toe has started to cross over into her #2 toe causing more significant pain. On a typical day, she finds herself sometimes walking on the balls of her feet rather than her toes due to the pain. She has also noticed lifting of her great toenails plates due to possible fungal infection.  Of note, the pt has Idiopathic neuropathy in left foot, but denies any hx of DM. Due to the current findings, affected activity of daily living and continued pain and discomfort, surgical intervention is indicated. The alternatives, risks, and complications, including but not limited to infection, blood loss, need for blood transfusion, neurovascular damage, mac-incisional numbness, subcutaneous hematoma, bone fracture, anesthetic complications, DVT, PE, death, RSD, postoperative stiffness and pain, possible surgical scar, delayed healing and nonhealing, reflexive sympathetic dystrophy, damage to blood vessels and nerves, need for more surgery, MI, and stroke, failure of hardware, gait disturbances  have been discussed. The patient understands and wishes to proceed with surgery. The patient was counseled at length about the risks of guevara Covid-19 during their perioperative period and any recovery window from their procedure. The patient was made aware that guevara Covid-19  may worsen their prognosis for recovering from their procedure and lend to a higher morbidity and/or mortality risk. All material risks, benefits, and reasonable alternatives including postponing the procedure were discussed. The patient does wish to proceed with the procedure at this time. Patient has been cleared by cardiology. She is on Plavix due to a stent placement 10 years ago. She stopped taking the Plavix on Sunday in preparation for surgery. Patient states that she would prefer to stay in the hospital overnight for pain management. She reports a history of cystitis which has been treated at [facility] in past and expresses her concern with privacy during her surgery. She has a left great toe nail fungus which she is treating with topical medication. She states she has a left eye stye being treated with Doxycycline. She states she has a latex allergy and that paper tape is okay to use on her skin.      PAST MEDICAL HISTORY:     Past Medical History: Diagnosis Date    Asthma     CAD (coronary artery disease) 1998    \"stent in LAD\"    Cancer ([facility] [address]     squamous cell skin cancer on neck    First degree AV block     Gastroesophageal reflux disease without esophagitis 6/3/2019    GERD (gastroesophageal reflux disease)     History of ovarian cancer     HTN (hypertension)     Hyperlipidemia     Hypothyroidism     IC (interstitial cystitis)     Nausea & vomiting     Nausea    Pleural effusion 01/2019    Pneumonia 01/2019       CURRENT MEDICATIONS:     Current Outpatient Medications   Medication Sig Dispense Refill    promethazine (PHENERGAN) 25 mg tablet Take 1 Tab by mouth every six (6) hours as needed for Nausea. 30 Tab 0    polyethylene glycol (Miralax) 17 gram packet Take 1 Packet by mouth daily. 10 Packet 1    HYDROcodone-acetaminophen (NORCO) 7.5-325 mg per tablet Take 1-2 Tabs by mouth every eight (8) hours as needed for Pain for up to 7 days. Max Daily Amount: 6 Tabs. 42 Tab 0    metoprolol succinate (TOPROL-XL) 50 mg XL tablet TAKE 1 TABLET BY MOUTH EVERY DAY 90 Tab 3    ciclopirox (Penlac) 8 % solution Apply 1 Each to affected area nightly. 2 Bottle 0    fluticasone furoate-vilanteroL (Breo Ellipta) 200-25 mcg/dose inhaler Take 1 Puff by inhalation daily. 3 Inhaler 1    montelukast (SINGULAIR) 10 mg tablet Take 1 Tab by mouth nightly. 90 Tab 1    amLODIPine (NORVASC) 5 mg tablet Take 1 Tab by mouth daily. 90 Tab 1    aspirin delayed-release 81 mg tablet Take 1 Tab by mouth daily. 90 Tab 3    clopidogreL (PLAVIX) 75 mg tab Take 1 Tab by mouth daily. 90 Tab 2    fenofibrate nanocrystallized (TRICOR) 48 mg tablet Take 1 Tab by mouth daily. 90 Tab 2    losartan (COZAAR) 25 mg tablet Take 1 Tab by mouth daily. 90 Tab 2    nitroglycerin (NITROSTAT) 0.4 mg SL tablet 1 Tab by SubLINGual route every five (5) minutes as needed for Chest Pain. 25 Tab 1    simvastatin (ZOCOR) 40 mg tablet Take 1 Tab by mouth nightly.  90 Tab 2    icosapent ethyL (VASCEPA) 1 gram capsule Take 1 Cap by mouth two (2) times daily (with meals). 180 Cap 3    levothyroxine (SYNTHROID) 25 mcg tablet Take 12.5 mcg by mouth Daily (before breakfast).  zolpidem (AMBIEN) 10 mg tablet Take 10 mg by mouth nightly as needed for Sleep.  diazePAM (VALIUM) 10 mg tablet TAKE 1 TABLET BY MOUTH EVERY DAY AS NEEDED  4    albuterol (PROVENTIL HFA, VENTOLIN HFA, PROAIR HFA) 90 mcg/actuation inhaler Take 1 Puff by inhalation every six (6) hours as needed for Wheezing. 1 Inhaler 3    ondansetron (ZOFRAN ODT) 4 mg disintegrating tablet Take 1 Tab by mouth every eight (8) hours as needed for Nausea. 14 Tab 0    traZODone (DESYREL) 100 mg tablet Take 100 mg by mouth nightly as needed.  phenazopyridine (PYRIDIUM) 100 mg tablet Take 100 mg by mouth as needed. ALLERGIES:     Allergies   Allergen Reactions    Latex, Natural Rubber Rash    Adhesive Rash    Macrobid [Nitrofurantoin Monohyd/M-Cryst] Nausea and Vomiting    Nsaids (Non-Steroidal Anti-Inflammatory Drug) Other (comments)     Can not take secondary to current plavix and asa use.          SURGICAL HISTORY:     Past Surgical History:   Procedure Laterality Date    CARDIAC SURG PROCEDURE UNLIST  2016 & 2018    HX APPENDECTOMY      HX CHOLECYSTECTOMY      HX CORONARY STENT PLACEMENT  2007    HX CORONARY STENT PLACEMENT      HX HYSTERECTOMY      HX TUBAL LIGATION Bilateral        SOCIAL HISTORY:     Social History     Socioeconomic History    Marital status:      Spouse name: Not on file    Number of children: Not on file    Years of education: Not on file    Highest education level: Not on file   Tobacco Use    Smoking status: Never Smoker    Smokeless tobacco: Never Used   Substance and Sexual Activity    Alcohol use: Yes     Frequency: Monthly or less     Drinks per session: 1 or 2     Binge frequency: Never     Comment: 2 glasses of wine monthly    Drug use: Never    Sexual activity: Yes FAMILY HISTORY:     Family History   Problem Relation Age of Onset    Cancer Mother     Heart Disease Father     Hypertension Father     Elevated Lipids Father     Diabetes Neg Hx     Stroke Neg Hx        REVIEW OF SYSTEMS:     Negative for fevers, chills, chest pain, shortness of breath, weight loss, recent illness    General: Negative for fever and chills. No unexpected change in weight. Denies fatigue. No change in appetite. Skin: Negative for rash or itching. HEENT: Negative for congestion, sore throat, neck pain and neck stiffness. No change in vision or hearing. Hasn't noted any enlarged lymph nodes in the neck. Cardiovascular:  Negative for chest pain and palpitations. Has not noted pedal edema. Respiratory: Negative for cough, colds, sinus, hemoptysis, shortness of breath and wheezing. Gastrointestinal: Negative for nausea and vomiting, rectal bleeding, coffee ground emesis, abdominal pain, diarrhea and constipation. Genitourinary: Negative for dysuria, frequency urgency, or burning on micturition. No flank pain, no foul smelling urine, no difficulty with initiating urination. Hematological: Negative for bleeding or easy bruising. Musculoskeletal: Negative for arthralgias, back pain or neck pain. Neurological: Negative for dizziness, seizures or syncopal episodes. Denies headaches. Endocrine: Denies excessive thirst.  No heat/cold intolerance. Psychiatric: Negative for depression or insomnia.     PHYSICAL EXAMINATION:         Visit Vitals  BP (!) 153/96 (BP 1 Location: Right arm, BP Patient Position: Sitting)   Pulse 65   Temp 97.3 °F (36.3 °C)   Ht 5' 5.5\" (1.664 m)   Wt 194 lb 3.2 oz (88.1 kg)   SpO2 97%   BMI 31.83 kg/m²       Pain Assessment  9/15/2020   Location of Pain Foot   Location Modifiers Left   Severity of Pain 2   Quality of Pain Aching   Duration of Pain -   Frequency of Pain -   Aggravating Factors -   Limiting Behavior -   Relieving Factors -   Result of Injury - GEN:  Well developed, well nourished 76 y.o. female in no acute distress. PSYCH: Alert an oriented to person, place and time. Mood, memory, affect, behavior and judgment normal. Speech normal in context and clarity. HEENT: Normocephalic and atraumatic. Eyes: Conjunctivae and EOM are normal.Pupils are equal, round, and reactive to light. External ear normal appearance, external nose normal appearing. Mouth/Throat: Oropharynx is clear and moist, able to handle oral secretions w/out difficulty, airway patent. NECK: Supple. Normal ROM, No lymphadenopathy. Trachea is midline. No bruising, swelling or deformity  RESP: Clear to auscultation bilaterally. No audible wheezing from mouth. No rales, rhonchi. Normal effort and breath sounds. No respiratory use of accessory muscles during breathing or distress  CHEST/ABDOMEN: Observation reveals: No audible wheezing from mouth. No accessory use of chest muscles during breathing. Non tender abdomen  CARDIO:  Normal rate, regular rhythm and normal heart sounds. No MGR. ABDOMEN: Non-tender, non-distended, normoactive bowel sounds in all four quadrants. There is no tenderness. There is no rebound and no guarding. BACK: No CVA or spinal tenderness  BREAST:  Deferred  PELVIC:    Deferred   RECTAL:  Deferred   :           Deferred  EXTREMITIES: EXAMINATION OF:     ANKLE/FOOT left     Gait: Normal  Tenderness: Moderate tenderness to the 2,3,4 metatarsal heads                          NT to great toe with grind test  Cutaneous: severe bunion of great toe; hammering of #2,3, 4 toes; increased splaying across first TMT joint. pronation of left great toe, toe abutment                          Fungal infection of great toenail plate (L>R)  pronation of left great toe, toe abutment  Joint Motion:WNL. Joint / Tendon Stability: No Ankle or Subtalar instability or joint laxity.                                              No peroneal sublux ability or dislocation  Alignment: Forefoot, Midfoot, Hindfoot WNL. Neuro Motor/Sensory: NL/NL. Some Diminished sensations across hindfoot with microfilament test  Vascular: NL foot/ankle pulses. Lymphatics: No extremity lymphedema, No calf swelling, no tenderness to calf muscles.     ANKLE/FOOT right  Gait: Normal  Tenderness: No tenderness . Cutaneous: Fungal infection of great toenail plate (L>R)  Joint Motion: WNL. Joint / Tendon Stability: No Ankle or Subtalar instability or joint laxity. No peroneal sublux ability or dislocation  Alignment: Forefoot, Midfoot, Hindfoot WNL. Neuro Motor/Sensory: NL/NL. Vascular: NL foot/ankle pulses. Lymphatics: No extremity lymphedema, No calf swelling, no tenderness to calf muscles. RADIOGRAPHS & DIAGNOSTIC STUDIES:     FOOT X RAYS 3 VIEWS Left   6/15/2020     WEIGHT BEARING     X RAYS AT [address] [place] [address]  6/15/2020        Bones: No fractures or dislocations. No focal osteolytic or osteoblastic process               Bone Spurs: No significant bone spurs   Alignment: Bunion Alignment: WNL               severe Hallux Valgus Alignment, severe increased IM ANGLE (1st/2nd)              Metatarsus Adductus is mild              Midfoot Alignment is WNL. Joint:  Degenerative changes, seen across the first TMT region, no significant degenerative changes, seen across the great toe first MTP with her first great toe first MTP, is subluxed about 40% in the lateral direction. Soft Tissues: Soft Tissues No abnormal calcific densities to soft tissues                No ankle joint effusion in lateral projection.   Mineralization: Suggests Osteopenia         LABS:         Hospital Outpatient Visit on 09/14/2020   Component Date Value Ref Range Status    Special Requests: 09/14/2020 NO SPECIAL REQUESTS    Preliminary    [place] Count 09/14/2020     Preliminary                    Value:[id number]  COLONIES/mL      Culture result: 09/14/2020 GRAM NEGATIVE RODS*   Preliminary    Color 09/14/2020 DARK YELLOW    Final    Appearance 09/14/2020 CLEAR    Final    Specific gravity 09/14/2020 1.026  1.005 - 1.030   Final    pH (UA) 09/14/2020 5.5  5.0 - 8.0   Final    Protein 09/14/2020 Negative  NEG mg/dL Final    Glucose 09/14/2020 Negative  NEG mg/dL Final    Ketone 09/14/2020 Negative  NEG mg/dL Final    Bilirubin 09/14/2020 Negative  NEG   Final    Blood 09/14/2020 Negative  NEG   Final    Urobilinogen 09/14/2020 1.0  0.2 - 1.0 EU/dL Final    Nitrites 09/14/2020 Negative  NEG   Final    Leukocyte Esterase 09/14/2020 SMALL* NEG   Final    WBC 09/14/2020 4 to 5  0 - 4 /hpf Final    RBC 09/14/2020 Negative  0 - 5 /hpf Final    Epithelial cells 09/14/2020 Negative  0 - 5 /lpf Final    Bacteria 09/14/2020 FEW* NEG /hpf Final    Mucus 09/14/2020 FEW* NEG /lpf Final    CA Oxalate crystals 09/14/2020 1+* NEG Final        XR Results (most recent):  Results from Hospital Encounter encounter on 08/17/20   XR CHEST PA LAT    Narrative TWO VIEW CHEST RADIOGRAPH     INDICATION: Preoperative risk stratification. Preoperative evaluation for  bunionectomy. COMPARISON: Chest radiographs 2/25/2019. TECHNIQUE: PA and lateral views of the chest.      FINDINGS:    MEDIASTINUM: Cardiac silhouette is within normal limits. LUNGS: Lungs are clear. BONES/OTHER: No acute osseous abnormality. Right upper quadrant surgical clips. Impression IMPRESSION:  No acute cardiopulmonary process.          EKG RESULTS     Procedure Component Value Units Date/Time    EKG, 12 LEAD, SUBSEQUENT [[id number]] Collected:  08/17/20 1509    Order Status:  Completed Updated:  08/17/20 1942     Ventricular Rate 52 BPM      Atrial Rate 75 BPM      QRS Duration 92 ms      Q-T Interval 478 ms      QTC Calculation (Bezet) 444 ms      Calculated R Axis 38 degrees      Calculated T Axis 47 degrees      Diagnosis --     Normal sinus rhythm  Low voltage QRS  Cannot rule out Anteroseptal infarct , age undetermined  Abnormal ECG  When compared with ECG of 22-JUL-2019 16:23,  Atrial fibrillation has replaced Sinus rhythm  Minimal criteria for Anteroseptal infarct are now present  ST now depressed in Anterior leads  Confirmed by Aamir Roach ([id number]) on 8/17/2020 7:42:16 PM            Preoperative labs were reviewed. Patient has been treated for UTI. Chest X-ray revealed no acute cardiopulmonary process   EKG:  Has been reviewed by anesthesia and clearance has been completed    ASSESSMENT:       ICD-10-CM ICD-9-CM    1. Bunion of great toe  M21.619 727.1    2. Left foot pain  M79.672 729.5    3. Neuropathy  G62.9 355.9    4. Pre-operative examination  Z01.818 V72.84        PLAN:     Again, the alternatives, risks, and complications, as well as expected outcome were discussed. The patient understands and agrees to proceed with the above listed surgery.      Chava Carmen PA-C, [facility]  9/15/2020  3:12 PM    Reyna Gonzalez MD  9/18/2020  7:17 AM

## 2020-09-11 DIAGNOSIS — Z01.818 PREOP EXAMINATION: Primary | ICD-10-CM

## 2020-09-14 ENCOUNTER — HOSPITAL ENCOUNTER (OUTPATIENT)
Dept: PREADMISSION TESTING | Age: 65
Discharge: HOME | End: 2020-09-14
Payer: COMMERCIAL

## 2020-09-14 DIAGNOSIS — Z01.818 PREOP EXAMINATION: ICD-10-CM

## 2020-09-14 LAB
APPEARANCE UR: CLEAR
BACTERIA URNS QL MICRO: ABNORMAL /HPF
BILIRUB UR QL: NEGATIVE
CAOX CRY URNS QL MICRO: ABNORMAL
COLOR UR: ABNORMAL
EPITH CASTS URNS QL MICRO: NEGATIVE /LPF (ref 0–5)
GLUCOSE UR STRIP.AUTO-MCNC: NEGATIVE MG/DL
HGB UR QL STRIP: NEGATIVE
KETONES UR QL STRIP.AUTO: NEGATIVE MG/DL
LEUKOCYTE ESTERASE UR QL STRIP.AUTO: ABNORMAL
MUCOUS THREADS URNS QL MICRO: ABNORMAL /LPF
NITRITE UR QL STRIP.AUTO: NEGATIVE
PH UR STRIP: 5.5 [PH] (ref 5–8)
PROT UR STRIP-MCNC: NEGATIVE MG/DL
RBC #/AREA URNS HPF: NEGATIVE /HPF (ref 0–5)
SP GR UR REFRACTOMETRY: 1.03 (ref 1–1.03)
UROBILINOGEN UR QL STRIP.AUTO: 1 EU/DL (ref 0.2–1)
WBC URNS QL MICRO: ABNORMAL /HPF (ref 0–4)

## 2020-09-14 PROCEDURE — 87635 SARS-COV-2 COVID-19 AMP PRB: CPT

## 2020-09-14 PROCEDURE — 87186 SC STD MICRODIL/AGAR DIL: CPT

## 2020-09-14 PROCEDURE — 87086 URINE CULTURE/COLONY COUNT: CPT

## 2020-09-14 PROCEDURE — 81001 URINALYSIS AUTO W/SCOPE: CPT

## 2020-09-14 PROCEDURE — 87077 CULTURE AEROBIC IDENTIFY: CPT

## 2020-09-15 ENCOUNTER — OFFICE VISIT (OUTPATIENT)
Dept: ORTHOPEDIC SURGERY | Age: 65
End: 2020-09-15

## 2020-09-15 VITALS
HEIGHT: 66 IN | HEART RATE: 65 BPM | OXYGEN SATURATION: 97 % | TEMPERATURE: 97.3 F | BODY MASS INDEX: 31.21 KG/M2 | DIASTOLIC BLOOD PRESSURE: 96 MMHG | WEIGHT: 194.2 LBS | SYSTOLIC BLOOD PRESSURE: 153 MMHG

## 2020-09-15 DIAGNOSIS — G62.9 NEUROPATHY: ICD-10-CM

## 2020-09-15 DIAGNOSIS — M79.672 LEFT FOOT PAIN: ICD-10-CM

## 2020-09-15 DIAGNOSIS — M21.619 BUNION OF GREAT TOE: Primary | ICD-10-CM

## 2020-09-15 DIAGNOSIS — Z01.818 PRE-OPERATIVE EXAMINATION: ICD-10-CM

## 2020-09-15 LAB — SARS-COV-2, COV2NT: NOT DETECTED

## 2020-09-15 RX ORDER — HYDROCODONE BITARTRATE AND ACETAMINOPHEN 7.5; 325 MG/1; MG/1
1-2 TABLET ORAL
Qty: 42 TAB | Refills: 0 | Status: SHIPPED | OUTPATIENT
Start: 2020-09-15 | End: 2020-09-22

## 2020-09-15 RX ORDER — PROMETHAZINE HYDROCHLORIDE 25 MG/1
25 TABLET ORAL
Qty: 30 TAB | Refills: 0 | Status: SHIPPED | OUTPATIENT
Start: 2020-09-15 | End: 2022-06-03

## 2020-09-15 RX ORDER — HYDROCODONE BITARTRATE AND ACETAMINOPHEN 7.5; 325 MG/1; MG/1
1-2 TABLET ORAL
Qty: 42 TAB | Refills: 0 | Status: CANCELLED | OUTPATIENT
Start: 2020-09-15 | End: 2020-09-22

## 2020-09-15 RX ORDER — POLYETHYLENE GLYCOL 3350 17 G/17G
17 POWDER, FOR SOLUTION ORAL DAILY
Qty: 10 PACKET | Refills: 1 | Status: SHIPPED | OUTPATIENT
Start: 2020-09-15

## 2020-09-15 NOTE — PROGRESS NOTES
Preoperative History and physical was completed during this encounter. Please see the H&P note for details. Visit Vitals  BP (!) 153/96 (BP 1 Location: Right arm, BP Patient Position: Sitting)   Pulse 65   Temp 97.3 °F (36.3 °C)   Ht 5' 5.5\" (1.664 m)   Wt 194 lb 3.2 oz (88.1 kg)   SpO2 97%   BMI 31.83 kg/m²       Pain Assessment  9/15/2020   Location of Pain Foot   Location Modifiers Left   Severity of Pain 2   Quality of Pain Aching   Duration of Pain -   Frequency of Pain -   Aggravating Factors -   Limiting Behavior -   Relieving Factors -   Result of Injury -       ICD-10-CM ICD-9-CM    1. Bunion of great toe  M21.619 727.1    2. Left foot pain  M79.672 729.5    3. Neuropathy  G62.9 355.9    4. Pre-operative examination  Z01.818 V72.84      Orders Placed This Encounter    Generic Supply Order     Crutches    Generic Supply Order     Shower chair  Toilet riser  Bedside commode    promethazine (PHENERGAN) 25 mg tablet     Sig: Take 1 Tab by mouth every six (6) hours as needed for Nausea. Dispense:  30 Tab     Refill:  0    polyethylene glycol (Miralax) 17 gram packet     Sig: Take 1 Packet by mouth daily. Dispense:  10 Packet     Refill:  1    HYDROcodone-acetaminophen (NORCO) 7.5-325 mg per tablet     Sig: Take 1-2 Tabs by mouth every eight (8) hours as needed for Pain for up to 7 days. Max Daily Amount: 6 Tabs.      Dispense:  42 Tab     Refill:  0       Shandra Yoder PA-C  9/15/2020  11:09 AM

## 2020-09-15 NOTE — PATIENT INSTRUCTIONS
Dr. Anne Ochoa Pre-operative Instructions: 
 
 
Patient: Aziza Dunaway :  1955 I understand I am to stop taking oral birth control pills, hormonal replacement therapy and all Aspirin, Aspirin containing medications, Non-Steroidal Anti-Inflammatory medications (such as Advil, Aleve, Motrin, Ibuprofen) and or Blood thinner medication such as Coumadin, Plavix, Heparin or others 5 days prior to surgery. I understand I am to STOP taking these Medications 5 days prior to surgery: 
I am to get instructions from my prescribing physician. 1. __As listed above_______________________ 2. _____________________________________ 3. _____________________________________ 4. _____________________________________ I understand that if I am taking daily medications for high blood pressure, I can take them the morning of surgery with a small sip of water. I will consult my prescribing physician or call [facility] with specific questions. I also understand that: ? I am to report important observations or changes that may occur prior to surgery. If I have any changes in my physical condition, such as a rash, a fever, sore throat, abscess, ulcers, nausea, vomiting, or diarrhea. I am to call the office and I am to consult my primary care physician to assess and treat the problem. ? I am not to eat or drink anything after midnight the night before my surgery. ? I am not to drink alcoholic beverages 24 hours prior to surgery. ? I am not to do any illegal drugs prior to surgery. ? I am not to smoke at least 24 hours prior to surgery. ? I am able and will shower or bathe before surgery. I will use the Hibiclens solution on my surgical site only. The hibiclens directions are one packet a day starting two days before surgery. ? I will remove any nail polish, make-up or jewelry prior to arriving for my surgery. ? If I wear glasses, contact lenses or dentures they must be removed prior to going to the operating room. ? All body piercing and artifical eye-lashes must be removed prior to surgery ? I will not wear any aerosol sprays, perfumes or skin creams. ? I am to make arrangements for a family member or friend to accompany me to surgery and take me home after my surgery as I will not be allowed to leave the hospital alone. A cab or bus will not be acceptable. Please make arrange for someone to stay with you for 24 hours after surgery. ? Patient has expressed understanding of the diagnosis, treatment and planned surgery Post operative medications will be e-scribed to your pharmacy on record if possible Acute Pain After Surgery: Care Instructions Your Care Instructions It's common to have some pain after surgery. Pain doesn't mean that something is wrong or that the surgery didn't go well. But when the pain is severe, it's important to work with your doctor to manage it. It's also important to be aware of a few facts about pain and pain medicine. · You are the only person who knows what your pain feels like. So be sure to tell your doctor when you are in pain or when the pain changes. Then he or she will know how to adjust your medicines. · Pain is often easier to control right after it starts. So it may be better to take regular doses of pain medicine and not wait until the pain gets bad. · Medicine can help control pain. But this doesn't mean you'll have no pain. Medicine works to keep the pain at a level you can live with. With time, you will feel better. Follow-up care is a key part of your treatment and safety. Be sure to make and go to all appointments, and call your doctor if you are having problems. It's also a good idea to know your test results and keep a list of the medicines you take. How can you care for yourself at home? · Be safe with medicines. Read and follow all instructions on the label. ¨ If the doctor gave you a prescription medicine for pain, take it as prescribed. ¨ If you are not taking a prescription pain medicine, ask your doctor if you can take an over-the-counter medicine. · If you take an over-the-counter pain medicine, such as acetaminophen (Tylenol), ibuprofen (Advil, Motrin), or naproxen (Aleve), read and follow all instructions on the label. · Do not take two or more pain medicines at the same time unless the doctor told you to. · Do not drink alcohol while you are taking pain medicines. · Try to walk each day if your doctor recommends it. Start by walking a little more than you did the day before. Bit by bit, increase the amount you walk. Walking increases blood flow. It also helps prevent pneumonia and constipation. · To prevent constipation from opioid pain medicines: ¨ Talk to your doctor about a laxative. ¨ Include fruits, vegetables, beans, and whole grains in your diet each day. These foods are high in fiber. ¨ Drink plenty of fluids, enough so that your urine is light yellow or clear like water. Drink water, fruit juice, or other drinks that do not contain caffeine or alcohol. If you have kidney, heart, or liver disease and have to limit fluids, talk with your doctor before you increase the amount of fluids you drink. ¨ Take a fiber supplement, such as Citrucel or Metamucil, every day if needed. Read and follow all instructions on the label. If you take pain medicine for more than a few days, talk to your doctor before you take fiber. When should you call for help? Call your doctor now or seek immediate medical care if: 
 · Your pain gets worse. · Your pain is not controlled by medicine. Watch closely for changes in your health, and be sure to contact your doctor if you have any problems.

## 2020-09-16 RX ORDER — ERGOCALCIFEROL 1.25 MG/1
50000 CAPSULE ORAL
COMMUNITY

## 2020-09-16 RX ORDER — CHOLECALCIFEROL (VITAMIN D3) 50 MCG
1 CAPSULE ORAL DAILY
COMMUNITY

## 2020-09-16 RX ORDER — ASCORBIC ACID 250 MG
250 TABLET ORAL
COMMUNITY

## 2020-09-17 ENCOUNTER — ANESTHESIA EVENT (OUTPATIENT)
Dept: SURGERY | Age: 65
End: 2020-09-17
Payer: COMMERCIAL

## 2020-09-17 LAB
BACTERIA SPEC CULT: ABNORMAL
CC UR VC: ABNORMAL
SERVICE CMNT-IMP: ABNORMAL

## 2020-09-18 ENCOUNTER — APPOINTMENT (OUTPATIENT)
Dept: GENERAL RADIOLOGY | Age: 65
End: 2020-09-18
Attending: ORTHOPAEDIC SURGERY
Payer: COMMERCIAL

## 2020-09-18 ENCOUNTER — ANESTHESIA (OUTPATIENT)
Dept: SURGERY | Age: 65
End: 2020-09-18
Payer: COMMERCIAL

## 2020-09-18 ENCOUNTER — HOSPITAL ENCOUNTER (OUTPATIENT)
Age: 65
Discharge: HOME | End: 2020-09-19
Attending: ORTHOPAEDIC SURGERY | Admitting: ORTHOPAEDIC SURGERY
Payer: COMMERCIAL

## 2020-09-18 PROBLEM — M21.619 BUNION OF GREAT TOE: Status: ACTIVE | Noted: 2020-09-18

## 2020-09-18 PROCEDURE — C1713 ANCHOR/SCREW BN/BN,TIS/BN: HCPCS | Performed by: ORTHOPAEDIC SURGERY

## 2020-09-18 PROCEDURE — 74011250636 HC RX REV CODE- 250/636: Performed by: NURSE ANESTHETIST, CERTIFIED REGISTERED

## 2020-09-18 PROCEDURE — 77030028525 HC BIT DRL KT SYNT -E: Performed by: ORTHOPAEDIC SURGERY

## 2020-09-18 PROCEDURE — 2709999900 HC NON-CHARGEABLE SUPPLY

## 2020-09-18 PROCEDURE — 2709999900 HC NON-CHARGEABLE SUPPLY: Performed by: ORTHOPAEDIC SURGERY

## 2020-09-18 PROCEDURE — 73630 X-RAY EXAM OF FOOT: CPT

## 2020-09-18 PROCEDURE — 74011250636 HC RX REV CODE- 250/636

## 2020-09-18 PROCEDURE — 76210000017 HC OR PH I REC 1.5 TO 2 HR: Performed by: ORTHOPAEDIC SURGERY

## 2020-09-18 PROCEDURE — 77030003666 HC NDL SPINAL BD -A: Performed by: ORTHOPAEDIC SURGERY

## 2020-09-18 PROCEDURE — 76942 ECHO GUIDE FOR BIOPSY: CPT | Performed by: ANESTHESIOLOGY

## 2020-09-18 PROCEDURE — 77030006773 HC BLD SAW OSC BRSM -A: Performed by: ORTHOPAEDIC SURGERY

## 2020-09-18 PROCEDURE — 77030035076: Performed by: ORTHOPAEDIC SURGERY

## 2020-09-18 PROCEDURE — 77030031139 HC SUT VCRL2 J&J -A: Performed by: ORTHOPAEDIC SURGERY

## 2020-09-18 PROCEDURE — 77030040922 HC BLNKT HYPOTHRM STRY -A: Performed by: ORTHOPAEDIC SURGERY

## 2020-09-18 PROCEDURE — 74011250636 HC RX REV CODE- 250/636: Performed by: PHYSICIAN ASSISTANT

## 2020-09-18 PROCEDURE — 74011250637 HC RX REV CODE- 250/637: Performed by: ORTHOPAEDIC SURGERY

## 2020-09-18 PROCEDURE — 74011250636 HC RX REV CODE- 250/636: Performed by: ANESTHESIOLOGY

## 2020-09-18 PROCEDURE — 74011000272 HC RX REV CODE- 272: Performed by: ORTHOPAEDIC SURGERY

## 2020-09-18 PROCEDURE — 99218 HC RM OBSERVATION: CPT

## 2020-09-18 PROCEDURE — 76060000042 HC ANESTHESIA 5.5 TO 6 HR: Performed by: ORTHOPAEDIC SURGERY

## 2020-09-18 PROCEDURE — 74011250637 HC RX REV CODE- 250/637: Performed by: ANESTHESIOLOGY

## 2020-09-18 PROCEDURE — 74011250637 HC RX REV CODE- 250/637: Performed by: NURSE ANESTHETIST, CERTIFIED REGISTERED

## 2020-09-18 PROCEDURE — 77030037938 HC BLD OSC SAW TREA -B: Performed by: ORTHOPAEDIC SURGERY

## 2020-09-18 PROCEDURE — C1776 JOINT DEVICE (IMPLANTABLE): HCPCS | Performed by: ORTHOPAEDIC SURGERY

## 2020-09-18 PROCEDURE — 77030002916 HC SUT ETHLN J&J -A: Performed by: ORTHOPAEDIC SURGERY

## 2020-09-18 PROCEDURE — C1769 GUIDE WIRE: HCPCS | Performed by: ORTHOPAEDIC SURGERY

## 2020-09-18 PROCEDURE — 77030027670 HC BIT DRL EXAC -C: Performed by: ORTHOPAEDIC SURGERY

## 2020-09-18 PROCEDURE — 74011000250 HC RX REV CODE- 250: Performed by: NURSE ANESTHETIST, CERTIFIED REGISTERED

## 2020-09-18 PROCEDURE — 74011250636 HC RX REV CODE- 250/636: Performed by: ORTHOPAEDIC SURGERY

## 2020-09-18 PROCEDURE — 77030002933 HC SUT MCRYL J&J -A: Performed by: ORTHOPAEDIC SURGERY

## 2020-09-18 PROCEDURE — 77030040361 HC SLV COMPR DVT MDII -B: Performed by: ORTHOPAEDIC SURGERY

## 2020-09-18 PROCEDURE — 74011000250 HC RX REV CODE- 250: Performed by: ORTHOPAEDIC SURGERY

## 2020-09-18 PROCEDURE — 76010000139 HC OR TIME 5.5 TO 6 HR: Performed by: ORTHOPAEDIC SURGERY

## 2020-09-18 PROCEDURE — 64450 NJX AA&/STRD OTHER PN/BRANCH: CPT | Performed by: ANESTHESIOLOGY

## 2020-09-18 PROCEDURE — 77030000032 HC CUF TRNQT ZIMM -B: Performed by: ORTHOPAEDIC SURGERY

## 2020-09-18 PROCEDURE — 77030014685 HC STIM BN GRWTH PHYSIO EXTERNAL ORTH -I1: Performed by: ORTHOPAEDIC SURGERY

## 2020-09-18 DEVICE — PHYSIO-STIM BONE GROWTH STIMULATOR
Type: IMPLANTABLE DEVICE | Site: FOOT | Status: FUNCTIONAL
Brand: PHYSIO-STIM

## 2020-09-18 DEVICE — IMPLANTABLE DEVICE: Type: IMPLANTABLE DEVICE | Site: FOOT | Status: FUNCTIONAL

## 2020-09-18 DEVICE — ANATOMIC BIPLANAR IMPLANTS
Type: IMPLANTABLE DEVICE | Site: FOOT | Status: FUNCTIONAL
Brand: LAPIPLASTY SYSTEM 2

## 2020-09-18 DEVICE — LONG SCREW PACK
Type: IMPLANTABLE DEVICE | Site: FOOT | Status: FUNCTIONAL
Brand: LAPIPLASTY SYSTEM 2

## 2020-09-18 DEVICE — STAPLE BNE FIX W9XH10MM WIRE 1.5X1.5MM HND WRST NIT CONT: Type: IMPLANTABLE DEVICE | Site: FOOT | Status: FUNCTIONAL

## 2020-09-18 DEVICE — COMPONENT TOE JT W5XL15MM L PROX DST X TYP HAMMERLOCK 2: Type: IMPLANTABLE DEVICE | Site: FOOT | Status: FUNCTIONAL

## 2020-09-18 RX ORDER — DEXAMETHASONE SODIUM PHOSPHATE 4 MG/ML
INJECTION, SOLUTION INTRA-ARTICULAR; INTRALESIONAL; INTRAMUSCULAR; INTRAVENOUS; SOFT TISSUE AS NEEDED
Status: DISCONTINUED | OUTPATIENT
Start: 2020-09-18 | End: 2020-09-18 | Stop reason: HOSPADM

## 2020-09-18 RX ORDER — FENOFIBRATE 48 MG/1
48 TABLET, COATED ORAL DAILY
Status: DISCONTINUED | OUTPATIENT
Start: 2020-09-19 | End: 2020-09-19 | Stop reason: HOSPADM

## 2020-09-18 RX ORDER — ROPIVACAINE HYDROCHLORIDE 5 MG/ML
INJECTION, SOLUTION EPIDURAL; INFILTRATION; PERINEURAL
Status: COMPLETED | OUTPATIENT
Start: 2020-09-18 | End: 2020-09-18

## 2020-09-18 RX ORDER — PROPOFOL 10 MG/ML
INJECTION, EMULSION INTRAVENOUS AS NEEDED
Status: DISCONTINUED | OUTPATIENT
Start: 2020-09-18 | End: 2020-09-18 | Stop reason: HOSPADM

## 2020-09-18 RX ORDER — MIDAZOLAM HYDROCHLORIDE 1 MG/ML
INJECTION, SOLUTION INTRAMUSCULAR; INTRAVENOUS AS NEEDED
Status: DISCONTINUED | OUTPATIENT
Start: 2020-09-18 | End: 2020-09-18 | Stop reason: HOSPADM

## 2020-09-18 RX ORDER — ATORVASTATIN CALCIUM 20 MG/1
20 TABLET, FILM COATED ORAL DAILY
Status: DISCONTINUED | OUTPATIENT
Start: 2020-09-19 | End: 2020-09-19 | Stop reason: HOSPADM

## 2020-09-18 RX ORDER — ALBUTEROL SULFATE 90 UG/1
1 AEROSOL, METERED RESPIRATORY (INHALATION)
Status: DISCONTINUED | OUTPATIENT
Start: 2020-09-18 | End: 2020-09-19 | Stop reason: HOSPADM

## 2020-09-18 RX ORDER — ONDANSETRON 2 MG/ML
4 INJECTION INTRAMUSCULAR; INTRAVENOUS ONCE
Status: COMPLETED | OUTPATIENT
Start: 2020-09-18 | End: 2020-09-18

## 2020-09-18 RX ORDER — SODIUM CHLORIDE, SODIUM LACTATE, POTASSIUM CHLORIDE, CALCIUM CHLORIDE 600; 310; 30; 20 MG/100ML; MG/100ML; MG/100ML; MG/100ML
50 INJECTION, SOLUTION INTRAVENOUS CONTINUOUS
Status: DISCONTINUED | OUTPATIENT
Start: 2020-09-18 | End: 2020-09-18 | Stop reason: HOSPADM

## 2020-09-18 RX ORDER — SODIUM CHLORIDE 0.9 % (FLUSH) 0.9 %
5-40 SYRINGE (ML) INJECTION EVERY 8 HOURS
Status: DISCONTINUED | OUTPATIENT
Start: 2020-09-18 | End: 2020-09-19 | Stop reason: HOSPADM

## 2020-09-18 RX ORDER — LIDOCAINE HYDROCHLORIDE 20 MG/ML
INJECTION, SOLUTION EPIDURAL; INFILTRATION; INTRACAUDAL; PERINEURAL AS NEEDED
Status: DISCONTINUED | OUTPATIENT
Start: 2020-09-18 | End: 2020-09-18 | Stop reason: HOSPADM

## 2020-09-18 RX ORDER — NALOXONE HYDROCHLORIDE 0.4 MG/ML
0.4 INJECTION, SOLUTION INTRAMUSCULAR; INTRAVENOUS; SUBCUTANEOUS AS NEEDED
Status: DISCONTINUED | OUTPATIENT
Start: 2020-09-18 | End: 2020-09-19 | Stop reason: HOSPADM

## 2020-09-18 RX ORDER — HYDROCODONE BITARTRATE AND ACETAMINOPHEN 10; 325 MG/1; MG/1
2 TABLET ORAL
Status: DISCONTINUED | OUTPATIENT
Start: 2020-09-18 | End: 2020-09-19 | Stop reason: HOSPADM

## 2020-09-18 RX ORDER — FENTANYL CITRATE 50 UG/ML
100 INJECTION, SOLUTION INTRAMUSCULAR; INTRAVENOUS ONCE
Status: COMPLETED | OUTPATIENT
Start: 2020-09-18 | End: 2020-09-18

## 2020-09-18 RX ORDER — FAMOTIDINE 20 MG/1
20 TABLET, FILM COATED ORAL ONCE
Status: COMPLETED | OUTPATIENT
Start: 2020-09-18 | End: 2020-09-18

## 2020-09-18 RX ORDER — ASCORBIC ACID 250 MG
250 TABLET ORAL DAILY
Status: DISCONTINUED | OUTPATIENT
Start: 2020-09-19 | End: 2020-09-19 | Stop reason: HOSPADM

## 2020-09-18 RX ORDER — SODIUM CHLORIDE 0.9 % (FLUSH) 0.9 %
5-40 SYRINGE (ML) INJECTION AS NEEDED
Status: DISCONTINUED | OUTPATIENT
Start: 2020-09-18 | End: 2020-09-18 | Stop reason: HOSPADM

## 2020-09-18 RX ORDER — FENTANYL CITRATE 50 UG/ML
INJECTION, SOLUTION INTRAMUSCULAR; INTRAVENOUS AS NEEDED
Status: DISCONTINUED | OUTPATIENT
Start: 2020-09-18 | End: 2020-09-18 | Stop reason: HOSPADM

## 2020-09-18 RX ORDER — DEXAMETHASONE SODIUM PHOSPHATE 4 MG/ML
INJECTION, SOLUTION INTRA-ARTICULAR; INTRALESIONAL; INTRAMUSCULAR; INTRAVENOUS; SOFT TISSUE
Status: COMPLETED | OUTPATIENT
Start: 2020-09-18 | End: 2020-09-18

## 2020-09-18 RX ORDER — SODIUM CHLORIDE 0.9 % (FLUSH) 0.9 %
5-40 SYRINGE (ML) INJECTION AS NEEDED
Status: DISCONTINUED | OUTPATIENT
Start: 2020-09-18 | End: 2020-09-19 | Stop reason: HOSPADM

## 2020-09-18 RX ORDER — CLOPIDOGREL BISULFATE 75 MG/1
75 TABLET ORAL DAILY
Status: DISCONTINUED | OUTPATIENT
Start: 2020-09-18 | End: 2020-09-19 | Stop reason: HOSPADM

## 2020-09-18 RX ORDER — HYDROMORPHONE HYDROCHLORIDE 2 MG/ML
0.5 INJECTION, SOLUTION INTRAMUSCULAR; INTRAVENOUS; SUBCUTANEOUS
Status: COMPLETED | OUTPATIENT
Start: 2020-09-18 | End: 2020-09-18

## 2020-09-18 RX ORDER — ICOSAPENT ETHYL 1000 MG/1
1 CAPSULE ORAL EVERY 24 HOURS
Status: DISCONTINUED | OUTPATIENT
Start: 2020-09-18 | End: 2020-09-19 | Stop reason: HOSPADM

## 2020-09-18 RX ORDER — MIDAZOLAM HYDROCHLORIDE 1 MG/ML
2 INJECTION, SOLUTION INTRAMUSCULAR; INTRAVENOUS ONCE
Status: COMPLETED | OUTPATIENT
Start: 2020-09-18 | End: 2020-09-18

## 2020-09-18 RX ORDER — ZOLPIDEM TARTRATE 5 MG/1
5 TABLET ORAL
Status: DISCONTINUED | OUTPATIENT
Start: 2020-09-18 | End: 2020-09-19 | Stop reason: HOSPADM

## 2020-09-18 RX ORDER — CEFAZOLIN SODIUM 2 G/50ML
2 SOLUTION INTRAVENOUS EVERY 8 HOURS
Status: DISCONTINUED | OUTPATIENT
Start: 2020-09-18 | End: 2020-09-19 | Stop reason: HOSPADM

## 2020-09-18 RX ORDER — FENTANYL CITRATE 50 UG/ML
50 INJECTION, SOLUTION INTRAMUSCULAR; INTRAVENOUS AS NEEDED
Status: DISCONTINUED | OUTPATIENT
Start: 2020-09-18 | End: 2020-09-18 | Stop reason: HOSPADM

## 2020-09-18 RX ORDER — SODIUM CHLORIDE 0.9 % (FLUSH) 0.9 %
5-40 SYRINGE (ML) INJECTION EVERY 8 HOURS
Status: DISCONTINUED | OUTPATIENT
Start: 2020-09-18 | End: 2020-09-18 | Stop reason: HOSPADM

## 2020-09-18 RX ORDER — EPHEDRINE SULFATE/0.9% NACL/PF 50 MG/5 ML
SYRINGE (ML) INTRAVENOUS AS NEEDED
Status: DISCONTINUED | OUTPATIENT
Start: 2020-09-18 | End: 2020-09-18 | Stop reason: HOSPADM

## 2020-09-18 RX ORDER — METOPROLOL SUCCINATE 100 MG/1
100 TABLET, EXTENDED RELEASE ORAL DAILY
Status: DISCONTINUED | OUTPATIENT
Start: 2020-09-19 | End: 2020-09-19 | Stop reason: HOSPADM

## 2020-09-18 RX ORDER — AMLODIPINE BESYLATE 5 MG/1
5 TABLET ORAL DAILY
Status: DISCONTINUED | OUTPATIENT
Start: 2020-09-19 | End: 2020-09-19 | Stop reason: HOSPADM

## 2020-09-18 RX ORDER — SODIUM CHLORIDE, SODIUM LACTATE, POTASSIUM CHLORIDE, CALCIUM CHLORIDE 600; 310; 30; 20 MG/100ML; MG/100ML; MG/100ML; MG/100ML
100 INJECTION, SOLUTION INTRAVENOUS CONTINUOUS
Status: DISCONTINUED | OUTPATIENT
Start: 2020-09-18 | End: 2020-09-18 | Stop reason: HOSPADM

## 2020-09-18 RX ORDER — CEFAZOLIN SODIUM 2 G/50ML
2 SOLUTION INTRAVENOUS ONCE
Status: COMPLETED | OUTPATIENT
Start: 2020-09-18 | End: 2020-09-18

## 2020-09-18 RX ORDER — UREA 10 %
1 LOTION (ML) TOPICAL DAILY
Status: DISCONTINUED | OUTPATIENT
Start: 2020-09-19 | End: 2020-09-19

## 2020-09-18 RX ORDER — SUCCINYLCHOLINE CHLORIDE 20 MG/ML
INJECTION INTRAMUSCULAR; INTRAVENOUS AS NEEDED
Status: DISCONTINUED | OUTPATIENT
Start: 2020-09-18 | End: 2020-09-18 | Stop reason: HOSPADM

## 2020-09-18 RX ORDER — NITROFURANTOIN MACROCRYSTALS 50 MG/1
50 CAPSULE ORAL 4 TIMES DAILY
Status: DISCONTINUED | OUTPATIENT
Start: 2020-09-19 | End: 2020-09-19 | Stop reason: HOSPADM

## 2020-09-18 RX ORDER — SCOLOPAMINE TRANSDERMAL SYSTEM 1 MG/1
1 PATCH, EXTENDED RELEASE TRANSDERMAL
Status: DISCONTINUED | OUTPATIENT
Start: 2020-09-18 | End: 2020-09-18 | Stop reason: HOSPADM

## 2020-09-18 RX ORDER — ROPIVACAINE HYDROCHLORIDE 5 MG/ML
150 INJECTION, SOLUTION EPIDURAL; INFILTRATION; PERINEURAL
Status: COMPLETED | OUTPATIENT
Start: 2020-09-18 | End: 2020-09-18

## 2020-09-18 RX ORDER — ONDANSETRON 2 MG/ML
INJECTION INTRAMUSCULAR; INTRAVENOUS AS NEEDED
Status: DISCONTINUED | OUTPATIENT
Start: 2020-09-18 | End: 2020-09-18 | Stop reason: HOSPADM

## 2020-09-18 RX ORDER — PROMETHAZINE HYDROCHLORIDE 25 MG/1
25 TABLET ORAL
Status: DISCONTINUED | OUTPATIENT
Start: 2020-09-18 | End: 2020-09-19 | Stop reason: HOSPADM

## 2020-09-18 RX ORDER — HYDROCODONE BITARTRATE AND ACETAMINOPHEN 7.5; 325 MG/1; MG/1
1 TABLET ORAL
Status: DISCONTINUED | OUTPATIENT
Start: 2020-09-18 | End: 2020-09-19 | Stop reason: HOSPADM

## 2020-09-18 RX ADMIN — DEXAMETHASONE SODIUM PHOSPHATE 8 MG: 4 INJECTION, SOLUTION INTRAMUSCULAR; INTRAVENOUS at 08:04

## 2020-09-18 RX ADMIN — ONDANSETRON 4 MG: 2 INJECTION INTRAMUSCULAR; INTRAVENOUS at 12:21

## 2020-09-18 RX ADMIN — SODIUM CHLORIDE, SODIUM LACTATE, POTASSIUM CHLORIDE, AND CALCIUM CHLORIDE 50 ML/HR: 600; 310; 30; 20 INJECTION, SOLUTION INTRAVENOUS at 14:00

## 2020-09-18 RX ADMIN — MIDAZOLAM 2 MG: 1 INJECTION INTRAMUSCULAR; INTRAVENOUS at 07:29

## 2020-09-18 RX ADMIN — FENTANYL CITRATE 100 MCG: 50 INJECTION, SOLUTION INTRAMUSCULAR; INTRAVENOUS at 07:49

## 2020-09-18 RX ADMIN — SODIUM CHLORIDE, SODIUM LACTATE, POTASSIUM CHLORIDE, AND CALCIUM CHLORIDE 100 ML/HR: 600; 310; 30; 20 INJECTION, SOLUTION INTRAVENOUS at 06:53

## 2020-09-18 RX ADMIN — HYDROMORPHONE HYDROCHLORIDE 0.5 MG: 2 INJECTION, SOLUTION INTRAMUSCULAR; INTRAVENOUS; SUBCUTANEOUS at 13:51

## 2020-09-18 RX ADMIN — Medication 10 MG: at 08:03

## 2020-09-18 RX ADMIN — LIDOCAINE HYDROCHLORIDE 100 MG: 20 INJECTION, SOLUTION EPIDURAL; INFILTRATION; INTRACAUDAL; PERINEURAL at 07:51

## 2020-09-18 RX ADMIN — HYDROMORPHONE HYDROCHLORIDE 0.5 MG: 2 INJECTION, SOLUTION INTRAMUSCULAR; INTRAVENOUS; SUBCUTANEOUS at 14:01

## 2020-09-18 RX ADMIN — Medication 10 ML: at 15:00

## 2020-09-18 RX ADMIN — ROPIVACAINE HYDROCHLORIDE 20 ML: 5 INJECTION, SOLUTION EPIDURAL; INFILTRATION; PERINEURAL at 07:47

## 2020-09-18 RX ADMIN — ONDANSETRON 4 MG: 2 INJECTION INTRAMUSCULAR; INTRAVENOUS at 13:41

## 2020-09-18 RX ADMIN — CLOPIDOGREL 75 MG: 75 TABLET, FILM COATED ORAL at 18:00

## 2020-09-18 RX ADMIN — HYDROMORPHONE HYDROCHLORIDE 0.5 MG: 2 INJECTION, SOLUTION INTRAMUSCULAR; INTRAVENOUS; SUBCUTANEOUS at 13:41

## 2020-09-18 RX ADMIN — FENTANYL CITRATE 50 MCG: 50 INJECTION, SOLUTION INTRAMUSCULAR; INTRAVENOUS at 13:42

## 2020-09-18 RX ADMIN — FAMOTIDINE 20 MG: 20 TABLET ORAL at 06:57

## 2020-09-18 RX ADMIN — ICOSAPENT ETHYL 1 G: 1000 CAPSULE ORAL at 21:12

## 2020-09-18 RX ADMIN — MIDAZOLAM HYDROCHLORIDE 2 MG: 2 INJECTION, SOLUTION INTRAMUSCULAR; INTRAVENOUS at 07:42

## 2020-09-18 RX ADMIN — CEFAZOLIN SODIUM 2 G: 2 SOLUTION INTRAVENOUS at 11:58

## 2020-09-18 RX ADMIN — SUCCINYLCHOLINE CHLORIDE 120 MG: 20 INJECTION, SOLUTION INTRAMUSCULAR; INTRAVENOUS at 07:52

## 2020-09-18 RX ADMIN — CEFAZOLIN SODIUM 2 G: 2 SOLUTION INTRAVENOUS at 07:58

## 2020-09-18 RX ADMIN — ROPIVACAINE HYDROCHLORIDE 150 MG: 5 INJECTION, SOLUTION EPIDURAL; INFILTRATION; PERINEURAL at 07:31

## 2020-09-18 RX ADMIN — FENTANYL CITRATE 100 MCG: 50 INJECTION, SOLUTION INTRAMUSCULAR; INTRAVENOUS at 07:29

## 2020-09-18 RX ADMIN — HYDROMORPHONE HYDROCHLORIDE 0.5 MG: 2 INJECTION, SOLUTION INTRAMUSCULAR; INTRAVENOUS; SUBCUTANEOUS at 14:11

## 2020-09-18 RX ADMIN — FENTANYL CITRATE 100 MCG: 50 INJECTION, SOLUTION INTRAMUSCULAR; INTRAVENOUS at 07:42

## 2020-09-18 RX ADMIN — Medication 15 MG: at 08:07

## 2020-09-18 RX ADMIN — Medication 10 ML: at 21:11

## 2020-09-18 RX ADMIN — PROPOFOL 150 MG: 10 INJECTION, EMULSION INTRAVENOUS at 07:51

## 2020-09-18 RX ADMIN — DEXAMETHASONE SODIUM PHOSPHATE 8 MG: 4 INJECTION, SOLUTION INTRAMUSCULAR; INTRAVENOUS at 07:47

## 2020-09-18 RX ADMIN — SODIUM CHLORIDE, SODIUM LACTATE, POTASSIUM CHLORIDE, AND CALCIUM CHLORIDE: 600; 310; 30; 20 INJECTION, SOLUTION INTRAVENOUS at 11:10

## 2020-09-18 NOTE — ROUTINE PROCESS
TRANSFER - OUT REPORT:    Verbal report given to Cyndi(name) on Annel Nava  being transferred to [facility](unit) for routine progression of care       Report consisted of patients Situation, Background, Assessment and   Recommendations(SBAR). Information from the following report(s) SBAR, OR Summary, Intake/Output and MAR was reviewed with the receiving nurse. Lines:   Peripheral IV 09/18/20 Right Hand (Active)   Site Assessment Clean, dry, & intact 09/18/20 1321   Phlebitis Assessment 0 09/18/20 1321   Infiltration Assessment 0 09/18/20 1321   Dressing Status Clean, dry, & intact 09/18/20 1321   Dressing Type Transparent;Tape 09/18/20 1321   Hub Color/Line Status Pink; Infusing 09/18/20 1321        Opportunity for questions and clarification was provided.       Patient transported with:   O2 @ 2 liters  Registered Nurse

## 2020-09-18 NOTE — ANESTHESIA PREPROCEDURE EVALUATION
Relevant Problems   No relevant active problems       Anesthetic History     PONV          Review of Systems / Medical History      Pulmonary            Asthma : well controlled       Neuro/Psych   Within defined limits           Cardiovascular    Hypertension        Dysrhythmias   CAD    Exercise tolerance: >4 METS     GI/Hepatic/Renal     GERD: well controlled           Endo/Other      Hypothyroidism       Other Findings              Physical Exam    Airway  Mallampati: II  TM Distance: 4 - 6 cm  Neck ROM: normal range of motion   Mouth opening: Normal     Cardiovascular  Regular rate and rhythm,  S1 and S2 normal,  no murmur, click, rub, or gallop             Dental  No notable dental hx       Pulmonary  Breath sounds clear to auscultation               Abdominal  GI exam deferred       Other Findings            Anesthetic Plan    ASA: 3  Anesthesia type: general      Post-op pain plan if not by surgeon: peripheral nerve block single    Induction: Intravenous  Anesthetic plan and risks discussed with: Patient

## 2020-09-18 NOTE — BRIEF OP NOTE
Brief Postoperative Note    Patient: Luis Stover  YOB: 1955  MRN: [id number]    Date of Procedure: 9/18/2020     Pre-Op Diagnosis: M21.619 BUNION OF GREAT TOE, severe bunion deformity, left foot, rigid hammertoes, left #2 3 toes, semirigid left #4 toe    Post-Op Diagnosis: Same as preoperative diagnosis. Procedure(s):  LEFT LAPIDUS PROCEDURE/AKIN OSTEOTOMY/SECOND, THIRD AND FOURTH HAMMERTOE PIP RESECTION ARTHROPLASTY/C-ARM/LAPIPLASTY TRACE II SYSTEM, BME HAMMERLOCK IMPLANT, CANNULATED SCREWS/NERVE BLOCK    Surgeon(s):  Michael Jordan MD    Surgical Assistant: Physician Assistant: SHERIF Bains PA:  ASSISTANT    Sandra De Dios MPA, MA, PAJAE, PhD was medically necessary for the procedure. She assisted in : patient positioning, surgical incision retraction, placement of hardware, incision closure, placement of DME, and transfer of the patient to the PACU. Anesthesia: General and Regional Block    Estimated Blood Loss (mL): less than 50 ml      Tourniquet Time:  120 minutes at  300  Mm HG m then 10 minutes deflation, then 96 minutes 2nd tourniquet time  Complications: None    Specimens: * No specimens in log *     Implants:   Implant Name Type Inv.  Item Serial No.  Lot No. LRB No. Used Action   SCREW BNE LNG LAPIPLASTY SYS 2 - [id number]  SCREW BNE LNG LAPIPLASTY SYS 2  [facility]_ [id number] Left 2 Implanted   SCREW BNE BIPLANAR RENÉE FOR LAPIPLASTY SYS 2 - [id number]  SCREW BNE BIPLANAR RENÉE FOR LAPIPLASTY SYS 2  [employer]_ [id number] Left 1 Implanted   SCREW BNE NANNETTE 3X32 MM HDLSS NS EPIC - [id number]  SCREW BNE NANNETTE 3X32 MM HDLSS NS EPIC  [employer]WD N/A Left 1 Implanted   STAPLE BNE FIX [id number] WIRE 1.5X1.5MM HND WRST NIT CONT - [id number]  STAPLE BNE FIX [id number] WIRE 1.5X1.5MM HND WRST NIT CONT  [employer]_ [id number] Left 1 Implanted   STIMULATOR BNE DST TIB FIB TARSAL METATARSAL CASTED APPL - [id number] STIMULATOR BNE DST TIB FIB TARSAL METATARSAL CASTED APPL  [employer]_WD N/A Left 1 Implanted   STAPLE BNE FIX [id number] WIRE 1.5X1.5MM HND WRST NIT CONT - [id number]  STAPLE BNE FIX [id number] WIRE 1.5X1.5MM HND WRST NIT CONT  DEPUY SYNTHES [facility] [id number] Left 1 Implanted   COMPONENT TOE JT [id number] L PROX DST X TYP HAMMERLOCK 2 - [id number]  COMPONENT TOE JT [id number] L PROX DST X TYP HAMMERLOCK 2  DEPUY SYNTHES [facility] [id number] Left 1 Implanted   IMPLANT PHLANG JT SM W5.6XL17.2MM 0DEG PROX NIT FOR HAMR - [id number]  IMPLANT PHLANG JT SM W5.6XL17.2MM 0DEG PROX NIT FOR HAMR  DEPUY SYNTHES [facility] [id number] Left 1 Implanted       Drains: * No LDAs found *    Findings: Severe bunion left deformity, left foot multiple hammertoes//     Electronically Signed by Cordell Muniz MD on 9/18/2020 at 2:18 PM

## 2020-09-18 NOTE — INTERVAL H&P NOTE
Update History & Physical    The Patient's History and Physical of September 9/18/2020 7:18 AM ,     was reviewed with the patient and I examined the patient. There was no change. The surgical site was confirmed by the patient and me. Plan:  The risk, benefits, expected outcome, and alternative to the recommended procedure have been discussed with the patient. Patient understands and wants to proceed with the procedure.     Electronically signed by Malgorzata Lizarraga MD on 9/18/2020 at 7:18 AM

## 2020-09-18 NOTE — ANESTHESIA POSTPROCEDURE EVALUATION
Procedure(s):  LEFT LAPIDUS PROCEDURE/AKIN OSTEOTOMY/SECOND, THIRD AND FOURTH HAMMERTOE PIP RESECTION ARTHROPLASTY/C-ARM/LAPIPLASTY TRACE II SYSTEM, BME HAMMERLOCK IMPLANT, CANNULATED SCREWS/NERVE BLOCK.    general    Anesthesia Post Evaluation      Multimodal analgesia: multimodal analgesia used between 6 hours prior to anesthesia start to PACU discharge  Patient location during evaluation: bedside  Patient participation: complete - patient participated  Level of consciousness: awake  Pain management: adequate  Airway patency: patent  Anesthetic complications: no  Cardiovascular status: stable  Respiratory status: acceptable  Hydration status: acceptable  Post anesthesia nausea and vomiting:  controlled      INITIAL Post-op Vital signs:   Vitals Value Taken Time   /67 9/18/2020  2:56 PM   Temp 36.3 °C (97.4 °F) 9/18/2020  1:21 PM   Pulse 71 9/18/2020  3:00 PM   Resp 10 9/18/2020  3:00 PM   SpO2 96 % 9/18/2020  2:55 PM   Vitals shown include unvalidated device data.

## 2020-09-18 NOTE — ANESTHESIA PROCEDURE NOTES
Peripheral Block    Start time: 9/18/2020 7:28 AM  End time: 9/18/2020 7:30 AM  Performed by: Durga Keane MD  Authorized by: Durga Keane MD       Pre-procedure: Indications: at surgeon's request, post-op pain management and procedure for pain    Preanesthetic Checklist: patient identified, risks and benefits discussed, site marked, timeout performed, anesthesia consent given and patient being monitored      Block Type:   Block Type:  Femoral continuous and popliteal  Laterality:  Left  Monitoring:  Standard ASA monitoring, heart rate, continuous pulse ox, responsive to questions, frequent vital sign checks and oxygen  Injection Technique:  Continuous  Procedures: ultrasound guided    Patient Position: supine  Prep: chlorhexidine    Location:  Upper thigh  Needle Type:  Tuohy  Needle Gauge:  18 G  Needle Localization:  Ultrasound guidance    Assessment:  Number of attempts:  1  Injection Assessment:  Incremental injection every 5 mL, no paresthesia, ultrasound image on chart, no intravascular symptoms, negative aspiration for blood and local visualized surrounding nerve on ultrasound  Patient tolerance:  Patient tolerated the procedure well with no immediate complications  Location:  PREOP HOLDING    Patient given 2 mg IV Versed and 100 mcg IV Fentanyl for sedation.           9/18/2020     7:47 AM     Lia Spence MD

## 2020-09-19 VITALS
TEMPERATURE: 98.8 F | SYSTOLIC BLOOD PRESSURE: 137 MMHG | BODY MASS INDEX: 32.39 KG/M2 | DIASTOLIC BLOOD PRESSURE: 78 MMHG | RESPIRATION RATE: 16 BRPM | HEART RATE: 75 BPM | WEIGHT: 194.38 LBS | OXYGEN SATURATION: 95 % | HEIGHT: 65 IN

## 2020-09-19 PROCEDURE — 2709999900 HC NON-CHARGEABLE SUPPLY

## 2020-09-19 PROCEDURE — 74011250636 HC RX REV CODE- 250/636: Performed by: ORTHOPAEDIC SURGERY

## 2020-09-19 PROCEDURE — 74011250637 HC RX REV CODE- 250/637: Performed by: ORTHOPAEDIC SURGERY

## 2020-09-19 PROCEDURE — 51798 US URINE CAPACITY MEASURE: CPT

## 2020-09-19 RX ORDER — UREA 10 %
1 LOTION (ML) TOPICAL DAILY
Status: DISCONTINUED | OUTPATIENT
Start: 2020-09-19 | End: 2020-09-19 | Stop reason: HOSPADM

## 2020-09-19 RX ADMIN — AMLODIPINE BESYLATE 5 MG: 5 TABLET ORAL at 09:16

## 2020-09-19 RX ADMIN — Medication 1 TABLET: at 09:17

## 2020-09-19 RX ADMIN — FENOFIBRATE 48 MG: 48 TABLET, FILM COATED ORAL at 09:17

## 2020-09-19 RX ADMIN — CEFAZOLIN SODIUM 2 G: 2 SOLUTION INTRAVENOUS at 09:18

## 2020-09-19 RX ADMIN — ATORVASTATIN CALCIUM 20 MG: 20 TABLET, FILM COATED ORAL at 09:17

## 2020-09-19 RX ADMIN — HYDROCODONE BITARTRATE AND ACETAMINOPHEN 1 TABLET: 7.5; 325 TABLET ORAL at 09:25

## 2020-09-19 RX ADMIN — Medication 250 MG: at 09:16

## 2020-09-19 RX ADMIN — METOPROLOL SUCCINATE 100 MG: 100 TABLET, EXTENDED RELEASE ORAL at 09:16

## 2020-09-19 RX ADMIN — CEFAZOLIN SODIUM 2 G: 2 SOLUTION INTRAVENOUS at 02:45

## 2020-09-19 RX ADMIN — NITROFURANTOIN MACROCRYSTALS 50 MG: 50 CAPSULE ORAL at 09:17

## 2020-09-19 RX ADMIN — Medication 10 ML: at 05:55

## 2020-09-19 RX ADMIN — HYDROCODONE BITARTRATE AND ACETAMINOPHEN 1 TABLET: 7.5; 325 TABLET ORAL at 05:55

## 2020-09-19 RX ADMIN — HYDROCODONE BITARTRATE AND ACETAMINOPHEN 2 TABLET: 10; 325 TABLET ORAL at 00:21

## 2020-09-19 RX ADMIN — CLOPIDOGREL 75 MG: 75 TABLET, FILM COATED ORAL at 09:17

## 2020-09-19 NOTE — PROGRESS NOTES
Bedside and Verbal shift change report given to GAVINO Ortiz (oncoming nurse) by Lauren Flores RN (offgoing nurse). Report included the following information SBAR, Kardex, Procedure Summary, Intake/Output and MAR.

## 2020-09-19 NOTE — PROGRESS NOTES
Aox4, no apparent distress, bed alarm went off, didn't call nurse before getting out of the bed. Patient was assisted to use bathroom by two nurses. Advised that she will be move to room closer to the nursing station but refused, [facility]. Education provided but continue to refuse. Call bell with in reach, bed in low position, placed back to bed alarm.

## 2020-09-19 NOTE — PROGRESS NOTES
Discharge order noted for today. Orders reviewed. Pt reports that she has a knee roller, crutches and bedside commode at home. CM explained that the rolling walker would not be covered by her insurance without a PT eval and recommendation for this. Pt reports that she does not want to receive and pay out of pocket for. CM informed bedside nurse Diana of above.  remains available if needed. Pt to be transported home by her .     Med Ny RN BSN  Care Manager  [phone]

## 2020-09-19 NOTE — PROGRESS NOTES
Problem: Pain  Goal: *Control of Pain  Outcome: Progressing Towards Goal  Goal: *PALLIATIVE CARE:  Alleviation of Pain  Outcome: Progressing Towards Goal     Problem: Patient Education: Go to Patient Education Activity  Goal: Patient/Family Education  Outcome: Progressing Towards Goal     Problem: General Medical Care Plan  Goal: *Vital signs within specified parameters  Outcome: Progressing Towards Goal  Goal: *Labs within defined limits  Outcome: Progressing Towards Goal  Goal: *Absence of infection signs and symptoms  Outcome: Progressing Towards Goal  Goal: *Optimal pain control at patient's stated goal  Outcome: Progressing Towards Goal  Goal: *Skin integrity maintained  Outcome: Progressing Towards Goal  Goal: *Fluid volume balance  Outcome: Progressing Towards Goal  Goal: *Optimize nutritional status  Outcome: Progressing Towards Goal  Goal: *Anxiety reduced or absent  Outcome: Progressing Towards Goal  Goal: *Progressive mobility and function (eg: ADL's)  Outcome: Progressing Towards Goal     Problem: Patient Education: Go to Patient Education Activity  Goal: Patient/Family Education  Outcome: Progressing Towards Goal     Problem: Falls - Risk of  Goal: *Absence of Falls  Description: Document Sarah Fall Risk and appropriate interventions in the flowsheet.   Outcome: Progressing Towards Goal  Note: Fall Risk Interventions:  Mobility Interventions: Patient to call before getting OOB         Medication Interventions: Patient to call before getting OOB                   Problem: Patient Education: Go to Patient Education Activity  Goal: Patient/Family Education  Outcome: Progressing Towards Goal     Problem: Infection - Risk of, Surgical Site Infection  Goal: *Absence of surgical site infection signs and symptoms  Outcome: Progressing Towards Goal     Problem: Patient Education: Go to Patient Education Activity  Goal: Patient/Family Education  Outcome: Progressing Towards Goal

## 2020-09-19 NOTE — ROUTINE PROCESS
Pt refusing PT consult. Informed she cannot get walker without consult. I Verbalized understanding. I have reviewed discharge instructions and medications with the patient. The patient verbalized understanding. Patient armband removed and shredded     Dressings C/D/I. No questions or concerns verbalized.

## 2020-09-19 NOTE — PROGRESS NOTES
Physical therapy order received and chart reviewed. Patient was not in her room. Patient has already left the hospital at this time, per the nurse. Per nurse, patient refused to wait for physical therapy assessment.   Thank you for this referral,  Nandini Bermudez, PT, DPT

## 2020-09-19 NOTE — PROGRESS NOTES
Problem: Pain  Goal: *Control of Pain  9/19/2020 1311 by Josué Sanchez  Outcome: Resolved/Met  9/19/2020 1311 by Josué Sanchez  Outcome: Progressing Towards Goal  Goal: *PALLIATIVE CARE:  Alleviation of Pain  9/19/2020 1311 by Josué Sanchez  Outcome: Resolved/Met  9/19/2020 1311 by Josué Sanchez  Outcome: Progressing Towards Goal

## 2020-09-19 NOTE — PROGRESS NOTES
[facility]    Progress Note      Patient: Denisha Bates               Sex: female          DOA: 9/18/2020         YOB: 1955      Age:  76 y.o.        LOS:  LOS: 0 days         S/P  Procedure(s):  LEFT LAPIDUS PROCEDURE/AKIN OSTEOTOMY/SECOND, THIRD AND FOURTH HAMMERTOE PIP RESECTION ARTHROPLASTY/C-ARM/LAPIPLASTY TRACE II SYSTEM, BME HAMMERLOCK IMPLANT, CANNULATED SCREWS/NERVE BLOCK               Subjective:     No complaints Tolerating diet Ambulating Voiding q shift. She is doing well this morning. Her pain is managed on current medications. She has been compliant with her NWB status. She has verbalized readiness to go home today. Denies cp, sob, abd pain   Objective:      Blood pressure (!) 168/81, pulse 72, temperature 98.2 °F (36.8 °C), resp. rate 16, height 5' 5\" (1.651 m), weight 194 lb 6 oz (88.2 kg), SpO2 97 %, not currently breastfeeding. Well developed, Well Nourished alert, cooperative, no distress  Incision clean, dry, no drainage, dressing in place  Mild swelling and tenderness noted in left lower extremity (foot)  Sensory is intact   Motor is intact  nv intact  2+distal pulses  Neg calf tenderness  Neg for facial asymmetry     Labs:  CBC  @  CBC:   Lab Results   Component Value Date/Time    WBC 8.0 08/17/2020 01:53 PM    RBC 4.35 08/17/2020 01:53 PM    HGB 13.8 08/17/2020 01:53 PM    HCT 40.8 08/17/2020 01:53 PM    PLATELET 301 09/93/3396 01:53 PM     CMP:   Lab Results   Component Value Date/Time    Glucose 86 08/17/2020 01:53 PM    Sodium 140 08/17/2020 01:53 PM    Potassium 3.7 08/17/2020 01:53 PM    Chloride 107 08/17/2020 01:53 PM    CO2 29 08/17/2020 01:53 PM    BUN 20 (H) 08/17/2020 01:53 PM    Creatinine 0.82 08/17/2020 01:53 PM    Calcium 9.0 08/17/2020 01:53 PM    Anion gap 4 08/17/2020 01:53 PM    BUN/Creatinine ratio 24 (H) 08/17/2020 01:53 PM    Alk.  phosphatase 66 08/17/2020 01:53 PM    Protein, total 6.8 08/17/2020 01:53 PM    Albumin 3.8 08/17/2020 01:53 PM    Globulin 3.0 08/17/2020 01:53 PM    A-G Ratio 1.3 08/17/2020 01:53 PM   @  Coagulation  Lab Results   Component Value Date    INR 1.1 09/24/2018    APTT 28.6 08/17/2020      Basic Metabolic Profile  Lab Results   Component Value Date     08/17/2020    CO2 29 08/17/2020    BUN 20 (H) 08/17/2020       Medications Reviewed      Assessment/Plan     Active Problems:    Bunion of great toe (9/18/2020)        Procedure(s):  LEFT LAPIDUS PROCEDURE/AKIN OSTEOTOMY/SECOND, THIRD AND FOURTH HAMMERTOE PIP RESECTION ARTHROPLASTY/C-ARM/LAPIPLASTY TRACE II SYSTEM, BME HAMMERLOCK IMPLANT, CANNULATED SCREWS/NERVE BLOCK :     1. PT and/or OT Consults: NO continue PT/OT: oob to chair with assistance, NWB LLE                                                2. Incision Care:  Routine Incision Care and Dressing Changes as necessary: NO dressing changes at this time  3. Pain control  4.  DVT Prophylaxis - SCD in place, Plavix    DISCHARGE PLANNING     Intervention : Home today        [facility]  [phone]

## 2020-09-19 NOTE — DISCHARGE INSTRUCTIONS
DISCHARGE SUMMARY from Nurse    PATIENT INSTRUCTIONS:    After general anesthesia or intravenous sedation, for 24 hours or while taking prescription Narcotics:  · Limit your activities  · Do not drive and operate hazardous machinery  · Do not make important personal or business decisions  · Do  not drink alcoholic beverages  · If you have not urinated within 8 hours after discharge, please contact your surgeon on call. Report the following to your surgeon:  · Excessive pain, swelling, redness or odor of or around the surgical area  · Temperature over 100.5  · Nausea and vomiting lasting longer than 4 hours or if unable to take medications  · Any signs of decreased circulation or nerve impairment to extremity: change in color, persistent  numbness, tingling, coldness or increase pain  · Any questions    What to do at Home:  Recommended activity: NWB to surgical foot    *  Please give a list of your current medications to your Primary Care Provider. *  Please update this list whenever your medications are discontinued, doses are      changed, or new medications (including over-the-counter products) are added. *  Please carry medication information at all times in case of emergency situations. These are general instructions for a healthy lifestyle:    No smoking/ No tobacco products/ Avoid exposure to second hand smoke  Surgeon General's Warning:  Quitting smoking now greatly reduces serious risk to your health. Obesity, smoking, and sedentary lifestyle greatly increases your risk for illness    A healthy diet, regular physical exercise & weight monitoring are important for maintaining a healthy lifestyle    You may be retaining fluid if you have a history of heart failure or if you experience any of the following symptoms:  Weight gain of 3 pounds or more overnight or 5 pounds in a week, increased swelling in our hands or feet or shortness of breath while lying flat in bed.   Please call your doctor as soon as you notice any of these symptoms; do not wait until your next office visit. The discharge information has been reviewed with the patient. The patient verbalized understanding. Discharge medications reviewed with the patient and appropriate educational materials and side effects teaching were provided.   ___________________________________________________________________________________________________________________________________

## 2020-09-23 ENCOUNTER — OFFICE VISIT (OUTPATIENT)
Dept: ORTHOPEDIC SURGERY | Age: 65
End: 2020-09-23
Payer: COMMERCIAL

## 2020-09-23 VITALS
DIASTOLIC BLOOD PRESSURE: 80 MMHG | HEIGHT: 65 IN | HEART RATE: 71 BPM | WEIGHT: 194 LBS | BODY MASS INDEX: 32.32 KG/M2 | SYSTOLIC BLOOD PRESSURE: 136 MMHG | RESPIRATION RATE: 16 BRPM | OXYGEN SATURATION: 95 % | TEMPERATURE: 96.9 F

## 2020-09-23 DIAGNOSIS — Z98.890 POST-OPERATIVE STATE: ICD-10-CM

## 2020-09-23 DIAGNOSIS — M21.619 BUNION OF GREAT TOE: Primary | ICD-10-CM

## 2020-09-23 DIAGNOSIS — M79.672 LEFT FOOT PAIN: ICD-10-CM

## 2020-09-23 DIAGNOSIS — G62.9 NEUROPATHY: ICD-10-CM

## 2020-09-23 PROCEDURE — 99024 POSTOP FOLLOW-UP VISIT: CPT | Performed by: PHYSICIAN ASSISTANT

## 2020-09-23 PROCEDURE — 73630 X-RAY EXAM OF FOOT: CPT | Performed by: PHYSICIAN ASSISTANT

## 2020-09-23 RX ORDER — CYCLOBENZAPRINE HCL 10 MG
10 TABLET ORAL
Qty: 20 TAB | Refills: 0 | Status: SHIPPED | OUTPATIENT
Start: 2020-09-23 | End: 2020-10-07

## 2020-09-23 NOTE — PATIENT INSTRUCTIONS
· Continue activity modification · Weight bearing status:  no weight bearing to the surgical extremity · No lifting, twisting, squatting, deep bending, driving or strenuous activity. · Please follow up as instructed · Please take medication as directed · Follow RICE protocol (protecting skin) · Maintain proper Nutrition · Take a multivitamin, calcium + Vitamin D supplement daily (if tolerated) · If you are a current smoker, quit or limit smoking · Keep the current dressings on and in place. You need to keep this incision clean and dry. If you have a splint or cast on please keep this clean and dry as well. · You should expect swelling and bruising in the area over the next several days. You may elevate the surgical extremity on 1 pillow. Place the pillow horizontal so that no pressure is on the back of your heel. You may apply an icepack on top of the dressing to help minimize the swelling. PLEASE SEEK IMMEDIATE ASSESSMENT BY ER PHYSICIAN IF ANY OF THE FOLLOWING EXIST:  
 
? Excessive pain, swelling, redness or odor of or around the surgical area ? Temperature over 100.5 ? Nausea and vomiting lasting longer than 4 hours or if unable to take medications ? Any signs of decreased circulation or nerve impairment to extremity: change in color, persistent numbness, tingling, coldness or increase pain ? If any calf pain, calf tightness, shortness of breath, chest pain ? Any difficulty breathing at rest or with ambulation, any chest tightness/soreness ? Severe intractable pain, persistent swelling or drainage, development of a wound, incisional redness, finger/toe swelling or color changes, or CALF PAIN 
 
· Perform ankle pumps with non-surgical/non-injured extremity to help reduce the risk of blood clots · Keep all pets away from  any wound present in order to prevent infection. Acute Pain After Surgery: Care Instructions Your Care Instructions It's common to have some pain after surgery. Pain doesn't mean that something is wrong or that the surgery didn't go well. But when the pain is severe, it's important to work with your doctor to manage it. It's also important to be aware of a few facts about pain and pain medicine. · You are the only person who knows what your pain feels like. So be sure to tell your doctor when you are in pain or when the pain changes. Then he or she will know how to adjust your medicines. · Pain is often easier to control right after it starts. So it may be better to take regular doses of pain medicine and not wait until the pain gets bad. · Medicine can help control pain. But this doesn't mean you'll have no pain. Medicine works to keep the pain at a level you can live with. With time, you will feel better. Follow-up care is a key part of your treatment and safety. Be sure to make and go to all appointments, and call your doctor if you are having problems. It's also a good idea to know your test results and keep a list of the medicines you take. How can you care for yourself at home? · Be safe with medicines. Read and follow all instructions on the label. ? If the doctor gave you a prescription medicine for pain, take it as prescribed. ? If you are not taking a prescription pain medicine, ask your doctor if you can take an over-the-counter medicine. · If you take an over-the-counter pain medicine, such as acetaminophen (Tylenol), ibuprofen (Advil, Motrin), or naproxen (Aleve), read and follow all instructions on the label. · Do not take two or more pain medicines at the same time unless the doctor told you to. · Do not drink alcohol while you are taking pain medicines. · Try to walk each day if your doctor recommends it. Start by walking a little more than you did the day before. Bit by bit, increase the amount you walk. Walking increases blood flow. It also helps prevent pneumonia and constipation. · To prevent constipation from opioid pain medicines: 
? Talk to your doctor about a laxative. ? Include fruits, vegetables, beans, and whole grains in your diet each day. These foods are high in fiber. ? Drink plenty of fluids, enough so that your urine is light yellow or clear like water. Drink water, fruit juice, or other drinks that do not contain caffeine or alcohol. If you have kidney, heart, or liver disease and have to limit fluids, talk with your doctor before you increase the amount of fluids you drink. ? Take a fiber supplement, such as Citrucel or Metamucil, every day if needed. Read and follow all instructions on the label. If you take pain medicine for more than a few days, talk to your doctor before you take fiber. When should you call for help? Call your doctor now or seek immediate medical care if: 
  · Your pain gets worse. · Your pain is not controlled by medicine. Watch closely for changes in your health, and be sure to contact your doctor if you have any problems. Where can you learn more? Go to http://florecita-georgie.info/. Enter ([phone] 023-158 in the search box to learn more about \"Acute Pain After Surgery: Care Instructions. \" Current as of: September 23, 2018 Content Version: 11.9 © 7656-4335 [employer]. Care instructions adapted under license by [employer] (which disclaims liability or warranty for this information). If you have questions about a medical condition or this instruction, always ask your healthcare professional. [place] [address] any warranty or liability for your use of this information.

## 2020-10-07 ENCOUNTER — OFFICE VISIT (OUTPATIENT)
Dept: ORTHOPEDIC SURGERY | Age: 65
End: 2020-10-07
Payer: COMMERCIAL

## 2020-10-07 VITALS
BODY MASS INDEX: 32.28 KG/M2 | DIASTOLIC BLOOD PRESSURE: 84 MMHG | HEART RATE: 96 BPM | RESPIRATION RATE: 16 BRPM | OXYGEN SATURATION: 97 % | HEIGHT: 65 IN | SYSTOLIC BLOOD PRESSURE: 137 MMHG | TEMPERATURE: 97.8 F

## 2020-10-07 DIAGNOSIS — M79.672 LEFT FOOT PAIN: ICD-10-CM

## 2020-10-07 DIAGNOSIS — M54.50 ACUTE BILATERAL LOW BACK PAIN WITHOUT SCIATICA: ICD-10-CM

## 2020-10-07 DIAGNOSIS — G62.9 NEUROPATHY: ICD-10-CM

## 2020-10-07 DIAGNOSIS — Z98.890 POST-OPERATIVE STATE: ICD-10-CM

## 2020-10-07 DIAGNOSIS — S39.012A LUMBAR STRAIN, INITIAL ENCOUNTER: ICD-10-CM

## 2020-10-07 DIAGNOSIS — M21.619 BUNION OF GREAT TOE: Primary | ICD-10-CM

## 2020-10-07 PROCEDURE — 72100 X-RAY EXAM L-S SPINE 2/3 VWS: CPT | Performed by: PHYSICIAN ASSISTANT

## 2020-10-07 PROCEDURE — 99024 POSTOP FOLLOW-UP VISIT: CPT | Performed by: PHYSICIAN ASSISTANT

## 2020-10-07 PROCEDURE — 73630 X-RAY EXAM OF FOOT: CPT | Performed by: PHYSICIAN ASSISTANT

## 2020-10-07 PROCEDURE — 99213 OFFICE O/P EST LOW 20 MIN: CPT | Performed by: PHYSICIAN ASSISTANT

## 2020-10-07 RX ORDER — TRAMADOL HYDROCHLORIDE 50 MG/1
50-100 TABLET ORAL
Qty: 42 TAB | Refills: 0 | Status: SHIPPED | OUTPATIENT
Start: 2020-10-07 | End: 2020-10-14 | Stop reason: SDUPTHER

## 2020-10-07 NOTE — PROGRESS NOTES
Patient: Federica Page                MRN: [id number]       SSN: [id number]  YOB: 1955                  AGE: 76 y.o. SEX: female    Rhea Marin MD      Chief Complaint: Foot Pain (left foot pain)        DOS: 9/18/2020  Left Lapidus Procedure/akin Osteotomy/second, Third And Fourth Hammertoe Pip Resection Arthroplasty/c-arm/lapiplasty Trace Ii System, Bme Hammerlock Implant, Cannulated Screws/nerve Block - Left    HPI:     The patient is a 76 y.o. female who presents today for follow up 19 days s/p above listed surgery. Since we last saw Federica Page in the office, the patient states that she sustained a slip/fall in the shower a week ago Saturday injuring her left lower back. patient states that the shower chair was not completely in the corner and when she went to sit on it, it flipped up. Patient states she has some soreness mostly to the left lower back and coccyx. Patient has a hx of neuropathy in toes prior to surgery and states that she has been having some cramps in her toes off/on. Patient denies any fever, chills, chest pain, shortness of breath or calf pain. Otherwise, there are no new illness or injuries to report since last seen in the office. Patient is on Plavix. Constipation has not been a concern. No changes in medications, allergies, social or family history. Patient self-reported status is as outlined in the following pain assessment. Pain Assessment  10/7/2020   Location of Pain Foot   Location Modifiers Left   Severity of Pain 5   Quality of Pain Sharp;Burning   Duration of Pain Persistent   Frequency of Pain Constant   Aggravating Factors (No Data)   Aggravating Factors Comment NWB   Limiting Behavior -   Relieving Factors Other (Comment); Rest;Elevation   Relieving Factors Comment HYDROCODONE   Result of Injury No         PHYSICAL EXAM:     Visit Vitals  /84 (BP 1 Location: Right arm, BP Patient Position: Sitting)   Pulse 96   Temp 97.8 °F (36.6 °C) (Temporal)   Resp 16   Ht 5' 5\" (1.651 m)   SpO2 97%   BMI 32.28 kg/m²         GEN:  Alert, well developed, well nourished, well appearing 76 y.o. female seated comfortably in no acute distress. Speech normal in context and clarity. Psychiatric: Affect, mood and conduct are appropriate. Alert, oriented x 3 alert, oriented x 3, memory grossly intact, no dementia      M/S EXAMINATION OF: left foot/ankle  DRESSINGS: CDI  DRAINAGE: none  INCISION: Incision looks good, skin well approximated, no dehiscence, nylon sutures in place without disruption. SKIN: moderate edema, no erythema, resolving ecchymosis, no warmth    TENDERNESS:  mild tenderness to palpation   NEUROVASCULAR:  Patient has prior hx of neuropathy. Otherwise, NV is grossly intact. Positive distal pulses and capillary refill. DVT ASSESSMENT:  The calf is not tender to palpation. No evidence of DVT seen on physical exam.  ROM: not tested                 M/S EXAMINATION OF: lumbar spine  SKIN: no edema, no erythema, no ecchymosis, no warmth    TENDERNESS:  mild tenderness to palpation to left lumbar spine  NEUROVASCULAR:  NV is grossly intact. Positive distal pulses and capillary refill. DVT ASSESSMENT:  The calf is not tender to palpation. No evidence of DVT seen on physical exam.  ROM: WNL     RADIOGRAPHS & DIAGNOSTIC STUDIES       Left foot X-RAYS, 3 views (AP, LAT, OBL) completed 10/7/2020 AT [address] [place] [address] for evaluation of post op changes s/p Left Lapidus Procedure/akin Osteotomy/second, Third And Fourth Hammertoe Pip Resection Arthroplasty/c-arm/lapiplasty Trace Ii System, Bme Hammerlock Implant, Cannulated Screws/nerve Block - Left      FINDINGS:    X-rays reveal post op changes s/p Left Lapidus Procedure/akin Osteotomy/second, Third And Fourth Hammertoe Pip Resection Arthroplasty/c-arm/lapiplasty Trace Ii System, Bme Hammerlock Implant, Cannulated Screws/nerve Block - Left . Overall alignment is acceptable. Hardware is in good position with no indication of movement or failure. Soft tissue swelling is moderate. Degenerative changes are noted. Mineralization suggests osteopenia. Calcified vessels are not present. Lumbar spine X-RAYS, 2 views (AP, LAT) completed 10/7/2020 AT [facility]        FINDINGS:    X-rays reveal no obvious acute fracture, dislocation or subluxation noted. No osteolytic or osteoblastic lesions noted. Mineralization suggests osteopenia. Degenerative changes are  noted. Calcified vessels are  present. IMPRESSION:     Encounter Diagnoses     ICD-10-CM ICD-9-CM   1. Bunion of great toe  M21.619 727.1   2. Left foot pain  M79.672 729.5   3. Post-operative state  Z98.890 V45.89   4. Acute bilateral low back pain without sciatica  M54.5 724.2     338.19   5. Neuropathy  G62.9 355.9   6. Lumbar strain, initial encounter  S39.012A 847.2       PLAN:       Orders Placed This Encounter    AMB POC XRAY, FOOT; COMPLETE, 3+ VIEW     ASK ALL FEMALE PATIENTS IF PREGNANT     Order Specific Question:   Reason for Exam     Answer:   PAIN    [28548] L/S 2-3 views     Order Specific Question:   Weight bearing? Answer:   No    traMADoL (Ultram) 50 mg tablet     Sig: Take 1-2 Tabs by mouth every eight (8) hours as needed for Pain for up to 7 days. Max Daily Amount: 300 mg. Indications: pain     Dispense:  42 Tab     Refill:  0                           · Continue activity modification    · Continue conservative tx for lumbar strain. MRI if no improvement. · Weight bearing status:  no weight bearing to the surgical extremity    · No lifting, twisting, squatting, deep bending, driving or strenuous activity.     · Please follow up as instructed    · Please take medication as directed    · Follow RICE protocol (protecting skin)    · Maintain proper Nutrition    · Take a multivitamin, calcium + Vitamin D supplement daily (if tolerated)    · If you are a current smoker, quit or limit smoking    · Keep the current dressings on and in place. You need to keep this incision clean and dry. If you have a splint or cast on please keep this clean and dry as well. · You should expect swelling and bruising in the area over the next several days. You may elevate the surgical extremity on 1 pillow. Place the pillow horizontal so that no pressure is on the back of your heel. You may apply an icepack on top of the dressing to help minimize the swelling. PLEASE SEEK IMMEDIATE ASSESSMENT BY ER PHYSICIAN IF ANY OF THE FOLLOWING EXIST:      Excessive pain, swelling, redness or odor of or around the surgical area    Temperature over 100.5    Nausea and vomiting lasting longer than 4 hours or if unable to take medications    Any signs of decreased circulation or nerve impairment to extremity: change in color, persistent numbness, tingling, coldness or increase pain    If any calf pain, calf tightness, shortness of breath, chest pain    Any difficulty breathing at rest or with ambulation, any chest tightness/soreness   Severe intractable pain, persistent swelling or drainage, development of a wound, incisional redness, finger/toe swelling or color changes, or CALF PAIN    · Perform ankle pumps with non-surgical/non-injured extremity to help reduce the risk of blood clots    · Keep all pets away from  any wound present in order to prevent infection. Please contact your primary care provider to follow-up on recommended health maintenance items           Jie Pierson presents today for post operative follow up and pain that is secondary to post operative state. Since surgery, the patient's pain has not changed. Patient describes the pain as aching, pulsating, throbbing. Since surgery, the patient is not able to perform normal daily activities. Patient reports the following adverse side effects from treatment: none.     Today - Pain Scale: 5/10    Prior records: N/A - post op   reviewed      Dr. Margie Jean Baptiste has been consulted during this visit and he agrees with the assessment and plan. Patient expresses understanding of the plan. All questions were answered. Patient education provided on post surgical care. Liana Linares has been asked to contact her primary care provider to follow-up on her recommended health maintenance items. REVIEW OF SYSTEMS:     Review of Systems: Chest pain: no  Shortness of breath: no  Fever: no  Night sweats: no  Weight loss: no  Constitutional signs: no  Review of all other systems is negative    Otherwise as noted in HPI      PAST MEDICAL HISTORY:     Past Medical History:   Diagnosis Date    Asthma     Under control. last attack appxox. 12/2019    CAD (coronary artery disease) 1998    \"stent in LAD\"    Cancer ([facility] [address]     squamous cell skin cancer on neck removed approx 2015    First degree AV block     Gastroesophageal reflux disease without esophagitis 6/3/2019    GERD (gastroesophageal reflux disease)     HTN (hypertension)     Hx vulvar dysplasia     Removed 08/2018    Hyperlipidemia     Hypothyroidism     no meds. under control    IC (interstitial cystitis)     Nausea & vomiting     Nausea    Pleural effusion 01/2019    Pneumonia 01/2019       MEDICATIONS:     Current Outpatient Medications   Medication Sig    traMADoL (Ultram) 50 mg tablet Take 1-2 Tabs by mouth every eight (8) hours as needed for Pain for up to 7 days. Max Daily Amount: 300 mg. Indications: pain    HYDROcodone-acetaminophen (NORCO) 7.5-325 mg per tablet Take 1-2 Tabs by mouth every eight (8) hours as needed for Pain for up to 7 days. Max Daily Amount: 6 Tabs.  ergocalciferol (Vitamin D2) 1,250 mcg (50,000 unit) capsule Take 50,000 Units by mouth.  ascorbic acid, vitamin C, (Vitamin C) 250 mg tablet Take 250 mg by mouth.  B.infantis-B.ani-B.long-B.bifi (Probiotic 4X) 10-15 mg TbEC Take 1 Cap by mouth daily.     promethazine (PHENERGAN) 25 mg tablet Take 1 Tab by mouth every six (6) hours as needed for Nausea.  polyethylene glycol (Miralax) 17 gram packet Take 1 Packet by mouth daily.  metoprolol succinate (TOPROL-XL) 50 mg XL tablet TAKE 1 TABLET BY MOUTH EVERY DAY    ciclopirox (Penlac) 8 % solution Apply 1 Each to affected area nightly.  fluticasone furoate-vilanteroL (Breo Ellipta) 200-25 mcg/dose inhaler Take 1 Puff by inhalation daily.  montelukast (SINGULAIR) 10 mg tablet Take 1 Tab by mouth nightly.  amLODIPine (NORVASC) 5 mg tablet Take 1 Tab by mouth daily.  aspirin delayed-release 81 mg tablet Take 1 Tab by mouth daily. (Patient taking differently: Take 81 mg by mouth daily. Stopped Sunday 09/13/20 for surgery.)    clopidogreL (PLAVIX) 75 mg tab Take 1 Tab by mouth daily. (Patient taking differently: Take 75 mg by mouth daily. Stopped Sunday 09/13/20 for surgery.)    fenofibrate nanocrystallized (TRICOR) 48 mg tablet Take 1 Tab by mouth daily.  losartan (COZAAR) 25 mg tablet Take 1 Tab by mouth daily.  nitroglycerin (NITROSTAT) 0.4 mg SL tablet 1 Tab by SubLINGual route every five (5) minutes as needed for Chest Pain.  simvastatin (ZOCOR) 40 mg tablet Take 1 Tab by mouth nightly.  icosapent ethyL (VASCEPA) 1 gram capsule Take 1 Cap by mouth two (2) times daily (with meals).  zolpidem (AMBIEN) 10 mg tablet Take 10 mg by mouth nightly as needed for Sleep.  diazePAM (VALIUM) 10 mg tablet TAKE 1 TABLET BY MOUTH EVERY DAY AS NEEDED    albuterol (PROVENTIL HFA, VENTOLIN HFA, PROAIR HFA) 90 mcg/actuation inhaler Take 1 Puff by inhalation every six (6) hours as needed for Wheezing.  ondansetron (ZOFRAN ODT) 4 mg disintegrating tablet Take 1 Tab by mouth every eight (8) hours as needed for Nausea.  phenazopyridine (PYRIDIUM) 100 mg tablet Take 100 mg by mouth as needed. As needed     No current facility-administered medications for this visit.         ALLERGIES:     Allergies   Allergen Reactions    Latex, Natural Rubber Rash    Adhesive Rash    Macrobid [Nitrofurantoin Monohyd/M-Cryst] Nausea and Vomiting    Nsaids (Non-Steroidal Anti-Inflammatory Drug) Other (comments)     Can not take secondary to current plavix and asa use.          PAST SURGICAL HISTORY:     Past Surgical History:   Procedure Laterality Date    CARDIAC SURG PROCEDURE UNLIST  2016 & 2018    HX APPENDECTOMY  1985    HX CHOLECYSTECTOMY  1982    HX CORONARY STENT PLACEMENT  2007    HX CORONARY STENT PLACEMENT      HX HYSTERECTOMY  2015    Complete    HX TUBAL LIGATION Bilateral 1981       SOCIAL HISTORY:     Social History     Socioeconomic History    Marital status:      Spouse name: Not on file    Number of children: Not on file    Years of education: Not on file    Highest education level: Not on file   Occupational History    Not on file   Social Needs    Financial resource strain: Not on file    Food insecurity     Worry: Not on file     Inability: Not on file    Transportation needs     Medical: Not on file     Non-medical: Not on file   Tobacco Use    Smoking status: Never Smoker    Smokeless tobacco: Never Used   Substance and Sexual Activity    Alcohol use: Not Currently     Frequency: Monthly or less     Drinks per session: 1 or 2     Binge frequency: Never     Comment: last use 06/2020    Drug use: Never    Sexual activity: Yes   Lifestyle    Physical activity     Days per week: Not on file     Minutes per session: Not on file    Stress: Not on file   Relationships    Social connections     Talks on phone: Not on file     Gets together: Not on file     Attends Hinduism service: Not on file     Active member of club or organization: Not on file     Attends meetings of clubs or organizations: Not on file     Relationship status: Not on file    Intimate partner violence     Fear of current or ex partner: Not on file     Emotionally abused: Not on file     Physically abused: Not on file     Forced sexual activity: Not on file   Other Topics Concern    Not on file   Social History Narrative    Not on file       FAMILY HISTORY:     Family History   Problem Relation Age of Onset    Cancer Mother     Heart Disease Father     Hypertension Father    [facility] Elevated Lipids Father     Diabetes Neg Hx     Stroke Neg Hx            Silvano Herr, [address] [place] [address]  10/7/2020       Disclaimer: Sections of this note are dictated utilizing voice recognition software, which may have resulted in some phonetic based errors in grammar and contents. Even though attempts were made to correct all the mistakes, some may have been missed, and remained in the body of the document. If questions arise, please contact our department.

## 2020-10-14 ENCOUNTER — OFFICE VISIT (OUTPATIENT)
Dept: ORTHOPEDIC SURGERY | Age: 65
End: 2020-10-14
Payer: MEDICARE

## 2020-10-14 VITALS
TEMPERATURE: 96.6 F | HEIGHT: 65 IN | SYSTOLIC BLOOD PRESSURE: 125 MMHG | DIASTOLIC BLOOD PRESSURE: 82 MMHG | WEIGHT: 194 LBS | OXYGEN SATURATION: 94 % | RESPIRATION RATE: 16 BRPM | BODY MASS INDEX: 32.32 KG/M2 | HEART RATE: 81 BPM

## 2020-10-14 DIAGNOSIS — M79.672 LEFT FOOT PAIN: ICD-10-CM

## 2020-10-14 DIAGNOSIS — G62.9 NEUROPATHY: ICD-10-CM

## 2020-10-14 DIAGNOSIS — M54.50 ACUTE BILATERAL LOW BACK PAIN WITHOUT SCIATICA: ICD-10-CM

## 2020-10-14 DIAGNOSIS — M21.619 BUNION OF GREAT TOE: Primary | ICD-10-CM

## 2020-10-14 DIAGNOSIS — Z98.890 POST-OPERATIVE STATE: ICD-10-CM

## 2020-10-14 PROCEDURE — 99024 POSTOP FOLLOW-UP VISIT: CPT | Performed by: PHYSICIAN ASSISTANT

## 2020-10-14 RX ORDER — CEPHALEXIN 500 MG/1
500 CAPSULE ORAL 4 TIMES DAILY
Qty: 28 CAP | Refills: 0 | Status: SHIPPED | OUTPATIENT
Start: 2020-10-14 | End: 2020-10-21

## 2020-10-14 RX ORDER — TRAMADOL HYDROCHLORIDE 50 MG/1
50-100 TABLET ORAL
Qty: 42 TAB | Refills: 0 | Status: SHIPPED | OUTPATIENT
Start: 2020-10-14 | End: 2020-10-20 | Stop reason: SDUPTHER

## 2020-10-14 NOTE — PATIENT INSTRUCTIONS
? Your sutures were removed in the office today ? You may shower in 3 days, NO soaking and no submerging in ANY water (no bath tubs, pools, hot tubs, oceans, etc) ? Do not pick at your skin or the incision. Allow the incision to air when at rest (away from pets). ? Pat dry after shower, no aggressive scrubbing or manipulation of the incision ? Apply a gentle moisturizer with no fragrance (such as plain Vaseline) twice daily ? No weightbearing on the affected extremity as instructed. You may apply weight on the heel in CAM boot Cover incision with large band aid or clean sock when wearing CAM boot to prevent irritation or friction against the incision Use Tubigrip for swelling Perform home exercises Follow up as instructed or sooner as needed

## 2020-10-14 NOTE — PROGRESS NOTES
Patient: Patric Rivero                MRN: [id number]       SSN: [id number]  YOB: 1955                  AGE: 76 y.o. SEX: female    Juliette Benítez MD      Chief Complaint: Foot Pain (left foot pain)        DOS: 9/18/2020  Left Lapidus Procedure/akin Osteotomy/second, Third And Fourth Hammertoe Pip Resection Arthroplasty/c-arm/lapiplasty Trace Ii System, Bme Hammerlock Implant, Cannulated Screws/nerve Block - Left    HPI:     The patient is a 76 y.o. female who presents today for follow up 26 days s/p above listed surgery. Since we last saw Patric Rivero in the office, the patient states that her left lower back pain has improved. Patient states she has some soreness mostly to the left lower back and coccyx. Patient has a hx of neuropathy in toes prior to surgery and states that she has been having some cramps in her toes off/on. Patient denies any fever, chills, chest pain, shortness of breath or calf pain. Otherwise, there are no new illness or injuries to report since last seen in the office. Patient is on Plavix. Constipation has not been a concern. No changes in medications, allergies, social or family history. Patient self-reported status is as outlined in the following pain assessment. Pain Assessment  10/14/2020   Location of Pain Foot   Location Modifiers Left   Severity of Pain 4   Quality of Pain Throbbing   Quality of Pain Comment itching.  swelling   Duration of Pain A few hours   Frequency of Pain Intermittent   Aggravating Factors Walking;Standing   Aggravating Factors Comment -   Limiting Behavior -   Relieving Factors NSAID   Relieving Factors Comment Hydrocodone   Result of Injury No         PHYSICAL EXAM:     Visit Vitals  /82 (BP 1 Location: Right arm, BP Patient Position: Sitting)   Pulse 81   Temp (!) 96.6 °F (35.9 °C) (Temporal)   Resp 16   Ht 5' 5\" (1.651 m)   Wt 194 lb (88 kg)   SpO2 94%   BMI 32.28 kg/m²         GEN:  Alert, well developed, well nourished, well appearing 76 y.o. female seated comfortably in no acute distress. Speech normal in context and clarity. Psychiatric: Affect, mood and conduct are appropriate. Alert, oriented x 3 alert, oriented x 3, memory grossly intact, no dementia      M/S EXAMINATION OF: left foot/ankle  DRESSINGS: CDI  DRAINAGE: none  INCISION: Incision looks good, skin well approximated, no dehiscence, nylon sutures in place without disruption. SKIN: moderate edema, no erythema, mild ecchymosis, no warmth    TENDERNESS:  mild tenderness to palpation   NEUROVASCULAR:  Patient has prior hx of neuropathy. Otherwise, NV is grossly intact. Positive distal pulses and capillary refill. DVT ASSESSMENT:  The calf is not tender to palpation. No evidence of DVT seen on physical exam.  ROM: not tested                  RADIOGRAPHS & DIAGNOSTIC STUDIES       Left foot X-RAYS, 3 views (AP, LAT, OBL) completed 10/14/2020 AT [address] [place] [address] for evaluation of post op changes s/p Left Lapidus Procedure/akin Osteotomy/second, Third And Fourth Hammertoe Pip Resection Arthroplasty/c-arm/lapiplasty Trace Ii System, Bme Hammerlock Implant, Cannulated Screws/nerve Block - Left      FINDINGS:    X-rays reveal post op changes s/p Left Lapidus Procedure/akin Osteotomy/second, Third And Fourth Hammertoe Pip Resection Arthroplasty/c-arm/lapiplasty Trace Ii System, Bme Hammerlock Implant, Cannulated Screws/nerve Block - Left . Overall alignment is acceptable. Hardware is in good position with no indication of movement or failure. Soft tissue swelling is moderate. Degenerative changes are noted. Mineralization suggests osteopenia. Calcified vessels are not present. IMPRESSION:     Encounter Diagnoses     ICD-10-CM ICD-9-CM   1. Bunion of great toe  M21.619 727.1   2. Left foot pain  M79.672 729.5   3. Post-operative state  Z98.890 V45.89   4.  Acute bilateral low back pain without sciatica M54.5 724.2     338.19   5. Neuropathy  G62.9 355.9       PLAN:       Orders Placed This Encounter    Generic Supply Order     Short CAM boot    cephALEXin (KEFLEX) 500 mg capsule     Sig: Take 1 Cap by mouth four (4) times daily for 7 days. Dispense:  28 Cap     Refill:  0    traMADoL (Ultram) 50 mg tablet     Sig: Take 1-2 Tabs by mouth every eight (8) hours as needed for Pain for up to 7 days. Max Daily Amount: 300 mg. Indications: pain     Dispense:  42 Tab     Refill:  0                         Your sutures were removed in the office today     You may shower in 3 days, NO soaking and no submerging in ANY water (no bath tubs, pools, hot tubs, oceans, etc)     Do not pick at your skin or the incision. Allow the incision to air when at rest (away from pets).  Pat dry after shower, no aggressive scrubbing or manipulation of the incision     Apply a gentle moisturizer with no fragrance (such as plain Vaseline) twice daily      No weightbearing on the affected extremity as instructed. You may apply weight on the heel in CAM boot    Cover incision with large band aid or clean sock when wearing CAM boot to prevent irritation or friction against the incision     Use Tubigrip for swelling     Perform home exercises    Follow up as instructed or sooner as needed               Ozzy Banks presents today for post operative follow up and pain that is secondary to post operative state. Since surgery, the patient's pain has not changed. Patient describes the pain as aching, pulsating, throbbing. Since surgery, the patient is not able to perform normal daily activities. Patient reports the following adverse side effects from treatment: none. Today - Pain Scale: /10    Prior records: N/A - post op   reviewed      Dr. Rosalinda Roach has been consulted during this visit and he agrees with the assessment and plan. Patient expresses understanding of the plan. All questions were answered.  Patient education provided on post surgical care. Marcellina Kehr has been asked to contact her primary care provider to follow-up on her recommended health maintenance items. REVIEW OF SYSTEMS:     Review of Systems: Chest pain: no  Shortness of breath: no  Fever: no  Night sweats: no  Weight loss: no  Constitutional signs: no  Review of all other systems is negative    Otherwise as noted in HPI      PAST MEDICAL HISTORY:     Past Medical History:   Diagnosis Date    Asthma     Under control. last attack appxox. 12/2019    CAD (coronary artery disease) 1998    \"stent in LAD\"    Cancer ([address]     squamous cell skin cancer on neck removed approx 2015    First degree AV block     Gastroesophageal reflux disease without esophagitis 6/3/2019    GERD (gastroesophageal reflux disease)     HTN (hypertension)     Hx vulvar dysplasia     Removed 08/2018    Hyperlipidemia     Hypothyroidism     no meds. under control    IC (interstitial cystitis)     Nausea & vomiting     Nausea    Pleural effusion 01/2019    Pneumonia 01/2019       MEDICATIONS:     Current Outpatient Medications   Medication Sig    cephALEXin (KEFLEX) 500 mg capsule Take 1 Cap by mouth four (4) times daily for 7 days.  traMADoL (Ultram) 50 mg tablet Take 1-2 Tabs by mouth every eight (8) hours as needed for Pain for up to 7 days. Max Daily Amount: 300 mg. Indications: pain    ergocalciferol (Vitamin D2) 1,250 mcg (50,000 unit) capsule Take 50,000 Units by mouth.  ascorbic acid, vitamin C, (Vitamin C) 250 mg tablet Take 250 mg by mouth.  B.infantis-B.ani-B.long-B.bifi (Probiotic 4X) 10-15 mg TbEC Take 1 Cap by mouth daily.  promethazine (PHENERGAN) 25 mg tablet Take 1 Tab by mouth every six (6) hours as needed for Nausea.  polyethylene glycol (Miralax) 17 gram packet Take 1 Packet by mouth daily.     metoprolol succinate (TOPROL-XL) 50 mg XL tablet TAKE 1 TABLET BY MOUTH EVERY DAY    ciclopirox (Penlac) 8 % solution Apply 1 Each to affected area nightly.  fluticasone furoate-vilanteroL (Breo Ellipta) 200-25 mcg/dose inhaler Take 1 Puff by inhalation daily.  montelukast (SINGULAIR) 10 mg tablet Take 1 Tab by mouth nightly.  amLODIPine (NORVASC) 5 mg tablet Take 1 Tab by mouth daily.  aspirin delayed-release 81 mg tablet Take 1 Tab by mouth daily. (Patient taking differently: Take 81 mg by mouth daily. Stopped Sunday 09/13/20 for surgery.)    clopidogreL (PLAVIX) 75 mg tab Take 1 Tab by mouth daily. (Patient taking differently: Take 75 mg by mouth daily. Stopped Sunday 09/13/20 for surgery.)    fenofibrate nanocrystallized (TRICOR) 48 mg tablet Take 1 Tab by mouth daily.  losartan (COZAAR) 25 mg tablet Take 1 Tab by mouth daily.  nitroglycerin (NITROSTAT) 0.4 mg SL tablet 1 Tab by SubLINGual route every five (5) minutes as needed for Chest Pain.  simvastatin (ZOCOR) 40 mg tablet Take 1 Tab by mouth nightly.  icosapent ethyL (VASCEPA) 1 gram capsule Take 1 Cap by mouth two (2) times daily (with meals).  zolpidem (AMBIEN) 10 mg tablet Take 10 mg by mouth nightly as needed for Sleep.  diazePAM (VALIUM) 10 mg tablet TAKE 1 TABLET BY MOUTH EVERY DAY AS NEEDED    albuterol (PROVENTIL HFA, VENTOLIN HFA, PROAIR HFA) 90 mcg/actuation inhaler Take 1 Puff by inhalation every six (6) hours as needed for Wheezing.  ondansetron (ZOFRAN ODT) 4 mg disintegrating tablet Take 1 Tab by mouth every eight (8) hours as needed for Nausea.  phenazopyridine (PYRIDIUM) 100 mg tablet Take 100 mg by mouth as needed. As needed     No current facility-administered medications for this visit. ALLERGIES:     Allergies   Allergen Reactions    Latex, Natural Rubber Rash    Adhesive Rash    Macrobid [Nitrofurantoin Monohyd/M-Cryst] Nausea and Vomiting    Nsaids (Non-Steroidal Anti-Inflammatory Drug) Other (comments)     Can not take secondary to current plavix and asa use.          PAST SURGICAL HISTORY:     Past Surgical History: Procedure Laterality Date    CARDIAC SURG PROCEDURE UNLIST  2016 & 2018    HX APPENDECTOMY  1985    HX CHOLECYSTECTOMY  1982    HX CORONARY STENT PLACEMENT  2007    HX CORONARY STENT PLACEMENT      HX HYSTERECTOMY  2015    Complete    HX TUBAL LIGATION Bilateral 1981       SOCIAL HISTORY:     Social History     Socioeconomic History    Marital status:      Spouse name: Not on file    Number of children: Not on file    Years of education: Not on file    Highest education level: Not on file   Occupational History    Not on file   Social Needs    Financial resource strain: Not on file    Food insecurity     Worry: Not on file     Inability: Not on file    Transportation needs     Medical: Not on file     Non-medical: Not on file   Tobacco Use    Smoking status: Never Smoker    Smokeless tobacco: Never Used   Substance and Sexual Activity    Alcohol use: Not Currently     Frequency: Monthly or less     Drinks per session: 1 or 2     Binge frequency: Never     Comment: last use 06/2020    Drug use: Never    Sexual activity: Yes   Lifestyle    Physical activity     Days per week: Not on file     Minutes per session: Not on file    Stress: Not on file   Relationships    Social connections     Talks on phone: Not on file     Gets together: Not on file     Attends Church service: Not on file     Active member of club or organization: Not on file     Attends meetings of clubs or organizations: Not on file     Relationship status: Not on file    Intimate partner violence     Fear of current or ex partner: Not on file     Emotionally abused: Not on file     Physically abused: Not on file     Forced sexual activity: Not on file   Other Topics Concern    Not on file   Social History Narrative    Not on file       FAMILY HISTORY:     Family History   Problem Relation Age of Onset    Cancer Mother     Heart Disease Father     Hypertension Father     Elevated Lipids Father     Diabetes Neg Hx     Stroke Neg Hx            Keena Peña, [facility]  10/14/2020       Disclaimer: Sections of this note are dictated utilizing voice recognition software, which may have resulted in some phonetic based errors in grammar and contents. Even though attempts were made to correct all the mistakes, some may have been missed, and remained in the body of the document. If questions arise, please contact our department.

## 2020-10-20 ENCOUNTER — DOCUMENTATION ONLY (OUTPATIENT)
Dept: ORTHOPEDIC SURGERY | Age: 65
End: 2020-10-20

## 2020-10-20 DIAGNOSIS — M54.50 ACUTE BILATERAL LOW BACK PAIN WITHOUT SCIATICA: ICD-10-CM

## 2020-10-20 DIAGNOSIS — Z98.890 POST-OPERATIVE STATE: ICD-10-CM

## 2020-10-20 DIAGNOSIS — M21.619 BUNION OF GREAT TOE: ICD-10-CM

## 2020-10-20 DIAGNOSIS — G62.9 NEUROPATHY: ICD-10-CM

## 2020-10-20 DIAGNOSIS — M79.672 LEFT FOOT PAIN: ICD-10-CM

## 2020-10-20 NOTE — TELEPHONE ENCOUNTER
[facility] date for Ultram as 10/14/20 for a 7 d/s. Last Visit: 10/14/20 with BARBARA Glynn  Next Appointment: 11/2/20 with BARBARA Glynn  Previous Refill Encounter(s): 10/14/20 #42    Requested Prescriptions     Pending Prescriptions Disp Refills    traMADoL (Ultram) 50 mg tablet 42 Tab 0     Sig: Take 1-2 Tabs by mouth every eight (8) hours as needed for Pain for up to 7 days. Max Daily Amount: 300 mg.  Indications: pain

## 2020-10-20 NOTE — PROGRESS NOTES
Patient called to report that her left foot was turning red possibly due to the steri strips. She was already prescribed Ceflex and is not that concerned, she just wanted it noted for the provider to be aware.

## 2020-10-20 NOTE — TELEPHONE ENCOUNTER
Patient is requesting refill of rx traMADoL (Ultram) 50 mg tablet called in to [facility] on file.

## 2020-10-21 RX ORDER — TRAMADOL HYDROCHLORIDE 50 MG/1
50-100 TABLET ORAL
Qty: 42 TAB | Refills: 0 | Status: SHIPPED | OUTPATIENT
Start: 2020-10-21 | End: 2020-11-02 | Stop reason: SDUPTHER

## 2020-10-21 NOTE — TELEPHONE ENCOUNTER
Patient called again and is asking for the Tramadol medication from [id number]. Patient said that she is not taking the Norco medication, that it is to strong for her. [facility] in [place] on [address] tel. [phone]. Patient tel. [phone].

## 2020-10-21 NOTE — TELEPHONE ENCOUNTER
The following prescriptions have been e-prescribed to the patients pharmacy:    Orders Placed This Encounter    traMADoL (Ultram) 50 mg tablet     Sig: Take 1-2 Tabs by mouth every eight (8) hours as needed for Pain for up to 7 days. Max Daily Amount: 300 mg. Indications: pain     Dispense:  42 Tab     Refill:  0           Angela Glynn, [facility], [place], PA-C  10/21/2020  1:25 PM

## 2020-11-17 ENCOUNTER — OFFICE VISIT (OUTPATIENT)
Dept: ORTHOPEDIC SURGERY | Age: 65
End: 2020-11-17
Payer: COMMERCIAL

## 2020-11-17 VITALS
DIASTOLIC BLOOD PRESSURE: 85 MMHG | OXYGEN SATURATION: 98 % | HEART RATE: 82 BPM | HEIGHT: 65 IN | BODY MASS INDEX: 32.28 KG/M2 | SYSTOLIC BLOOD PRESSURE: 149 MMHG | TEMPERATURE: 96.9 F

## 2020-11-17 DIAGNOSIS — M21.619 BUNION OF GREAT TOE: Primary | ICD-10-CM

## 2020-11-17 DIAGNOSIS — Z98.890 POST-OPERATIVE STATE: ICD-10-CM

## 2020-11-17 DIAGNOSIS — G62.9 NEUROPATHY: ICD-10-CM

## 2020-11-17 DIAGNOSIS — M79.672 LEFT FOOT PAIN: ICD-10-CM

## 2020-11-17 DIAGNOSIS — M54.50 ACUTE BILATERAL LOW BACK PAIN WITHOUT SCIATICA: ICD-10-CM

## 2020-11-17 PROCEDURE — 73630 X-RAY EXAM OF FOOT: CPT | Performed by: PHYSICIAN ASSISTANT

## 2020-11-17 PROCEDURE — 99024 POSTOP FOLLOW-UP VISIT: CPT | Performed by: PHYSICIAN ASSISTANT

## 2020-11-17 RX ORDER — TRAMADOL HYDROCHLORIDE 50 MG/1
50-100 TABLET ORAL
Qty: 42 TAB | Refills: 0 | Status: SHIPPED | OUTPATIENT
Start: 2020-11-17 | End: 2020-12-15 | Stop reason: SDUPTHER

## 2020-11-17 NOTE — PATIENT INSTRUCTIONS
? Apply a gentle moisturizer with no fragrance (such as plain Vaseline) twice daily ? No weightbearing on the affected extremity as instructed. You may apply weight on the heel in post op shoe or CAM boot · Cover incision with large band aid or clean sock when wearing CAM boot to prevent irritation or friction against the incision Use Tubigrip for swelling Perform home exercises Follow up as instructed or sooner as needed

## 2020-11-17 NOTE — PROGRESS NOTES
Patient: Bonifacio Breen                MRN: [id number]       SSN: [id number]  YOB: 1955                  AGE: 76 y.o. SEX: female    Paz Tinoco MD      Chief Complaint: Foot Pain (left foot)        DOS: 9/18/2020  Left Lapidus Procedure/akin Osteotomy/second, Third And Fourth Hammertoe Pip Resection Arthroplasty/c-arm/lapiplasty Trace Ii System, Bme Hammerlock Implant, Cannulated Screws/nerve Block - Left    HPI:     The patient is a 76 y.o. female who presents today for follow up 59 days s/p above listed surgery. Since we last saw Bonifacio Breen in the office, the patient states that she was doing well but feels like she has taken a step back. She states that she discontinued the CAM boot and has been WBAT in Isotoner-type bedroom booties/slippers. She states that the Velcro on the CAM boot was rolling under her foot and she was afraid she would slip/fall. Patient states she has some off/on sharp sensations along the medial eminence which is TTP. Patient has a hx of neuropathy in toes prior to surgery and has been having some cramps in her toes off/on. Patient denies any fever, chills, chest pain, shortness of breath or calf pain. Otherwise, there are no new illness or injuries to report since last seen in the office. Patient is on Plavix. No changes in medications, allergies, social or family history. Patient self-reported status is as outlined in the following pain assessment. Pain Assessment  11/17/2020   Location of Pain Foot   Location Modifiers Left   Severity of Pain 3   Quality of Pain Sharp;Burning   Quality of Pain Comment -   Duration of Pain A few hours   Frequency of Pain Intermittent   Aggravating Factors -   Aggravating Factors Comment -   Limiting Behavior -   Relieving Factors Elevation; Rest   Relieving Factors Comment -   Result of Injury -         PHYSICAL EXAM:     Visit Vitals  BP (!) 149/85 (BP 1 Location: Right arm, BP Patient Position: Sitting)   Pulse 82   Temp 96.9 °F (36.1 °C) (Temporal)   Ht 5' 5\" (1.651 m)   SpO2 98%   BMI 32.28 kg/m²         GEN:  Alert, well developed, well nourished, well appearing 76 y.o. female seated comfortably in no acute distress. Speech normal in context and clarity. Psychiatric: Affect, mood and conduct are appropriate. Alert, oriented x 3 alert, oriented x 3, memory grossly intact, no dementia      M/S EXAMINATION OF: left foot/ankle  INCISION: Incision looks good, skin well healed. SKIN: mild edema to forefoot and toes, no erythema, no ecchymosis, no warmth, dry skin  TENDERNESS:  mild tenderness to palpation to medial eminence    NEUROVASCULAR:  Patient has prior hx of neuropathy. Otherwise, NV is grossly intact. Positive distal pulses and capillary refill. DVT ASSESSMENT:  The calf is not tender to palpation. No evidence of DVT seen on physical exam.  ROM: not tested                  RADIOGRAPHS & DIAGNOSTIC STUDIES       Left foot X-RAYS, 3 views (AP, LAT, OBL) completed 11/17/2020 AT [address] [place] [address] for evaluation of post op changes s/p Left Lapidus Procedure/akin Osteotomy/second, Third And Fourth Hammertoe Pip Resection Arthroplasty/c-arm/lapiplasty Trace Ii System, Bme Hammerlock Implant, Cannulated Screws/nerve Block - Left      FINDINGS:    X-rays reveal post op changes s/p Left Lapidus Procedure/akin Osteotomy/second, Third And Fourth Hammertoe Pip Resection Arthroplasty/c-arm/lapiplasty Trace Ii System, Bme Hammerlock Implant, Cannulated Screws/nerve Block - Left . Overall alignment is acceptable. Hardware is in good position with no indication of movement or failure. Soft tissue swelling is moderate. Degenerative changes are noted. Mineralization suggests osteopenia. Calcified vessels are not present. IMPRESSION:     Encounter Diagnoses     ICD-10-CM ICD-9-CM   1. Bunion of great toe  M21.619 727.1   2. Left foot pain  M79.672 729.5   3. Post-operative state  Z98.890 V45.89   4. Acute bilateral low back pain without sciatica  M54.5 724.2     338.19   5. Neuropathy  G62.9 355.9       PLAN:       Orders Placed This Encounter    Generic Supply Order     Hard sole shoe    [29081] Foot Min 3V     Order Specific Question:   Weight bearing? Answer:   No    traMADoL (Ultram) 50 mg tablet     Sig: Take 1-2 Tabs by mouth every eight (8) hours as needed for Pain for up to 7 days. Max Daily Amount: 300 mg. Indications: pain     Dispense:  42 Tab     Refill:  0                       Apply a gentle moisturizer with no fragrance (such as plain Vaseline) twice daily      No weightbearing on the affected extremity as instructed. You may apply weight on the heel in post op shoe or CAM boot    · Cover incision with large band aid or clean sock when wearing CAM boot to prevent irritation or friction against the incision     · Use Tubigrip for swelling    · Perform home exercises    · Follow up as instructed or sooner as needed         Follow-up and Dispositions    · Return in about 4 weeks (around 12/15/2020) for follow up evaluation. Federica Page presents today for post operative follow up and pain that is secondary to post operative state. Since surgery, the patient's pain has not changed. Patient describes the pain as aching, pulsating, throbbing. Since surgery, the patient is not able to perform normal daily activities. Patient reports the following adverse side effects from treatment: none. Today - Pain Scale: 3/10    Prior records: N/A - post op   reviewed      Dr. Di Jeffery has been consulted during this visit and he agrees with the assessment and plan. Patient expresses understanding of the plan. All questions were answered. Patient education provided on post surgical care. Federica Page has been asked to contact her primary care provider to follow-up on her recommended health maintenance items.          REVIEW OF SYSTEMS:     Review of Systems: Chest pain: no  Shortness of breath: no  Fever: no  Night sweats: no  Weight loss: no  Constitutional signs: no  Review of all other systems is negative    Otherwise as noted in HPI      PAST MEDICAL HISTORY:     Past Medical History:   Diagnosis Date    Asthma     Under control. last attack appxox. 12/2019    CAD (coronary artery disease) 1998    \"stent in LAD\"    Cancer ([address]     squamous cell skin cancer on neck removed approx 2015    First degree AV block     Gastroesophageal reflux disease without esophagitis 6/3/2019    GERD (gastroesophageal reflux disease)     HTN (hypertension)     Hx vulvar dysplasia     Removed 08/2018    Hyperlipidemia     Hypothyroidism     no meds. under control    IC (interstitial cystitis)     Nausea & vomiting     Nausea    Pleural effusion 01/2019    Pneumonia 01/2019       MEDICATIONS:     Current Outpatient Medications   Medication Sig    traMADoL (Ultram) 50 mg tablet Take 1-2 Tabs by mouth every eight (8) hours as needed for Pain for up to 7 days. Max Daily Amount: 300 mg. Indications: pain    ergocalciferol (Vitamin D2) 1,250 mcg (50,000 unit) capsule Take 50,000 Units by mouth.  ascorbic acid, vitamin C, (Vitamin C) 250 mg tablet Take 250 mg by mouth.  B.infantis-B.ani-B.long-B.bifi (Probiotic 4X) 10-15 mg TbEC Take 1 Cap by mouth daily.  promethazine (PHENERGAN) 25 mg tablet Take 1 Tab by mouth every six (6) hours as needed for Nausea.  polyethylene glycol (Miralax) 17 gram packet Take 1 Packet by mouth daily.  metoprolol succinate (TOPROL-XL) 50 mg XL tablet TAKE 1 TABLET BY MOUTH EVERY DAY    ciclopirox (Penlac) 8 % solution Apply 1 Each to affected area nightly.  fluticasone furoate-vilanteroL (Breo Ellipta) 200-25 mcg/dose inhaler Take 1 Puff by inhalation daily.  montelukast (SINGULAIR) 10 mg tablet Take 1 Tab by mouth nightly.  amLODIPine (NORVASC) 5 mg tablet Take 1 Tab by mouth daily.     aspirin delayed-release 81 mg tablet Take 1 Tab by mouth daily. (Patient taking differently: Take 81 mg by mouth daily. Stopped Sunday 09/13/20 for surgery.)    fenofibrate nanocrystallized (TRICOR) 48 mg tablet Take 1 Tab by mouth daily.  losartan (COZAAR) 25 mg tablet Take 1 Tab by mouth daily.  nitroglycerin (NITROSTAT) 0.4 mg SL tablet 1 Tab by SubLINGual route every five (5) minutes as needed for Chest Pain.  simvastatin (ZOCOR) 40 mg tablet Take 1 Tab by mouth nightly.  icosapent ethyL (VASCEPA) 1 gram capsule Take 1 Cap by mouth two (2) times daily (with meals).  zolpidem (AMBIEN) 10 mg tablet Take 10 mg by mouth nightly as needed for Sleep.  diazePAM (VALIUM) 10 mg tablet TAKE 1 TABLET BY MOUTH EVERY DAY AS NEEDED    albuterol (PROVENTIL HFA, VENTOLIN HFA, PROAIR HFA) 90 mcg/actuation inhaler Take 1 Puff by inhalation every six (6) hours as needed for Wheezing.  ondansetron (ZOFRAN ODT) 4 mg disintegrating tablet Take 1 Tab by mouth every eight (8) hours as needed for Nausea.  phenazopyridine (PYRIDIUM) 100 mg tablet Take 100 mg by mouth as needed. As needed    clopidogreL (PLAVIX) 75 mg tab Take 1 Tab by mouth daily. (Patient taking differently: Take 75 mg by mouth daily. Stopped Sunday 09/13/20 for surgery.)     No current facility-administered medications for this visit. ALLERGIES:     Allergies   Allergen Reactions    Latex, Natural Rubber Rash    Adhesive Rash    Macrobid [Nitrofurantoin Monohyd/M-Cryst] Nausea and Vomiting    Nsaids (Non-Steroidal Anti-Inflammatory Drug) Other (comments)     Can not take secondary to current plavix and asa use.          PAST SURGICAL HISTORY:     Past Surgical History:   Procedure Laterality Date    CARDIAC SURG PROCEDURE UNLIST  2016 & 2018    HX APPENDECTOMY  1985    HX CHOLECYSTECTOMY  1982    HX CORONARY STENT PLACEMENT  2007    HX CORONARY STENT PLACEMENT      HX HYSTERECTOMY  2015    Complete    HX TUBAL LIGATION Bilateral 1981       SOCIAL HISTORY:     Social History     Socioeconomic History    Marital status:      Spouse name: Not on file    Number of children: Not on file    Years of education: Not on file    Highest education level: Not on file   Occupational History    Not on file   Social Needs    Financial resource strain: Not on file    Food insecurity     Worry: Not on file     Inability: Not on file    Transportation needs     Medical: Not on file     Non-medical: Not on file   Tobacco Use    Smoking status: Never Smoker    Smokeless tobacco: Never Used   Substance and Sexual Activity    Alcohol use: Not Currently     Frequency: Monthly or less     Drinks per session: 1 or 2     Binge frequency: Never     Comment: last use 06/2020    Drug use: Never    Sexual activity: Yes   Lifestyle    Physical activity     Days per week: Not on file     Minutes per session: Not on file    Stress: Not on file   Relationships    Social connections     Talks on phone: Not on file     Gets together: Not on file     Attends Shinto service: Not on file     Active member of club or organization: Not on file     Attends meetings of clubs or organizations: Not on file     Relationship status: Not on file    Intimate partner violence     Fear of current or ex partner: Not on file     Emotionally abused: Not on file     Physically abused: Not on file     Forced sexual activity: Not on file   Other Topics Concern    Not on file   Social History Narrative    Not on file       FAMILY HISTORY:     Family History   Problem Relation Age of Onset    Cancer Mother     Heart Disease Father     Hypertension Father     Elevated Lipids Father     Diabetes Neg Hx     Stroke Neg Hx            Silvano Herr, [facility]  11/17/2020       Disclaimer: Sections of this note are dictated utilizing voice recognition software, which may have resulted in some phonetic based errors in grammar and contents.  Even though attempts were made to correct all the mistakes, some may have been missed, and remained in the body of the document. If questions arise, please contact our department.

## 2020-12-15 ENCOUNTER — OFFICE VISIT (OUTPATIENT)
Dept: ORTHOPEDIC SURGERY | Age: 65
End: 2020-12-15
Payer: COMMERCIAL

## 2020-12-15 VITALS
OXYGEN SATURATION: 98 % | RESPIRATION RATE: 16 BRPM | SYSTOLIC BLOOD PRESSURE: 135 MMHG | TEMPERATURE: 96.4 F | BODY MASS INDEX: 32.28 KG/M2 | DIASTOLIC BLOOD PRESSURE: 71 MMHG | HEIGHT: 65 IN | HEART RATE: 61 BPM

## 2020-12-15 DIAGNOSIS — Z98.890 POST-OPERATIVE STATE: ICD-10-CM

## 2020-12-15 DIAGNOSIS — M79.672 LEFT FOOT PAIN: ICD-10-CM

## 2020-12-15 DIAGNOSIS — M21.619 BUNION OF GREAT TOE: Primary | ICD-10-CM

## 2020-12-15 DIAGNOSIS — M54.50 ACUTE BILATERAL LOW BACK PAIN WITHOUT SCIATICA: ICD-10-CM

## 2020-12-15 DIAGNOSIS — G62.9 NEUROPATHY: ICD-10-CM

## 2020-12-15 PROCEDURE — 99024 POSTOP FOLLOW-UP VISIT: CPT | Performed by: PHYSICIAN ASSISTANT

## 2020-12-15 PROCEDURE — 73630 X-RAY EXAM OF FOOT: CPT | Performed by: PHYSICIAN ASSISTANT

## 2020-12-15 RX ORDER — TRAMADOL HYDROCHLORIDE 50 MG/1
50-100 TABLET ORAL
Qty: 42 TAB | Refills: 0 | Status: SHIPPED | OUTPATIENT
Start: 2020-12-15 | End: 2020-12-22

## 2020-12-15 NOTE — PATIENT INSTRUCTIONS
· Weight bear as tolerated in supportive shoe with wide toe box · Use Tubigrip for swelling · Perform home exercises · Follow up as instructed or sooner as needed

## 2020-12-15 NOTE — PROGRESS NOTES
Patient: Chalo Peng                MRN: [id number]       SSN: [id number]  YOB: 1955                  AGE: 76 y.o. SEX: female    Albert Chaves MD      Chief Complaint: Foot Pain (left foot pain)        DOS: 9/18/2020  Left Lapidus Procedure/akin Osteotomy/second, Third And Fourth Hammertoe Pip Resection Arthroplasty/c-arm/lapiplasty Trace Ii System, Bme Hammerlock Implant, Cannulated Screws/nerve Block - Left    HPI:     The patient is a 76 y.o. female who presents today for follow up 80 days s/p above listed surgery. Since we last saw Chalo Peng in the office, the patient states that she was doing well and has been WBAT in her post op shoe. Patient states she has some off/on left foot pain and swelling. Patient has a hx of neuropathy in toes prior to surgery and has been having some cramps in her toes off/on. Patient denies any fever, chills, chest pain, shortness of breath or calf pain. Otherwise, there are no new illness or injuries to report since last seen in the office. Patient is on Plavix. No changes in medications, allergies, social or family history. Patient self-reported status is as outlined in the following pain assessment. Pain Assessment  12/15/2020   Location of Pain Foot   Location Modifiers Left   Severity of Pain 4   Quality of Pain Throbbing; Sharp   Quality of Pain Comment swelling   Duration of Pain A few hours   Frequency of Pain Intermittent   Aggravating Factors Walking;Standing;Stairs   Aggravating Factors Comment -   Limiting Behavior -   Relieving Factors Elevation; Rest   Relieving Factors Comment tramadol   Result of Injury No         PHYSICAL EXAM:     Visit Vitals  /71 (BP 1 Location: Right arm, BP Patient Position: Sitting)   Pulse 61   Temp (!) 96.4 °F (35.8 °C) (Temporal)   Resp 16   Ht 5' 5\" (1.651 m)   SpO2 98%   BMI 32.28 kg/m²         GEN:  Alert, well developed, well nourished, well appearing 76 y.o. female seated comfortably in no acute distress. Speech normal in context and clarity. Psychiatric: Affect, mood and conduct are appropriate. Alert, oriented x 3 alert, oriented x 3, memory grossly intact, no dementia      M/S EXAMINATION OF: left foot/ankle  INCISION: Incision looks good, skin well healed. SKIN: mild edema to forefoot and toes, no erythema, no ecchymosis, no warmth,  TENDERNESS:  mild tenderness to palpation to medial eminence    NEUROVASCULAR:  Patient has prior hx of neuropathy. Otherwise, NV is grossly intact. Positive distal pulses and capillary refill. DVT ASSESSMENT:  The calf is not tender to palpation. No evidence of DVT seen on physical exam.  ROM: not tested                  RADIOGRAPHS & DIAGNOSTIC STUDIES       Left foot X-RAYS, 3 views (AP, LAT, OBL) completed 12/15/2020 AT [address] [place] [address] for evaluation of post op changes s/p Left Lapidus Procedure/akin Osteotomy/second, Third And Fourth Hammertoe Pip Resection Arthroplasty/c-arm/lapiplasty Trace Ii System, Bme Hammerlock Implant, Cannulated Screws/nerve Block - Left      FINDINGS:    X-rays reveal post op changes s/p Left Lapidus Procedure/akin Osteotomy/second, Third And Fourth Hammertoe Pip Resection Arthroplasty/c-arm/lapiplasty Trace Ii System, Bme Hammerlock Implant, Cannulated Screws/nerve Block - Left . Overall alignment is acceptable. Hardware is in good position with no indication of movement or failure. Soft tissue swelling is moderate. Degenerative changes are noted. Mineralization suggests osteopenia. Calcified vessels are not present. IMPRESSION:     Encounter Diagnoses     ICD-10-CM ICD-9-CM   1. Bunion of great toe  M21.619 727.1   2. Post-operative state  Z98.890 V45.89   3. Left foot pain  M79.672 729.5   4. Acute bilateral low back pain without sciatica  M54.5 724.2     338.19   5.  Neuropathy  G62.9 355.9       PLAN:       Orders Placed This Encounter    AMB POC XRAY, FOOT; COMPLETE, 3+ VIEW     Order Specific Question:   Reason for Exam     Answer:   pain    traMADoL (Ultram) 50 mg tablet     Sig: Take 1-2 Tabs by mouth every eight (8) hours as needed for Pain for up to 7 days. Max Daily Amount: 300 mg. Indications: pain     Dispense:  42 Tab     Refill:  0                      · Weight bear as tolerated in supportive shoe with wide toe box     · Use Tubigrip for swelling    · Perform home exercises    · Follow up as instructed or sooner as needed         Follow-up and Dispositions    · Return in about 3 months (around 3/15/2021) for follow up evaluation. Jose Raul Baig presents today for post operative follow up and pain that is secondary to post operative state. Since surgery, the patient's pain has not changed. Patient describes the pain as aching, pulsating, throbbing. Since surgery, the patient is not able to perform normal daily activities. Patient reports the following adverse side effects from treatment: none. Today - Pain Scale: 3/10    Prior records: N/A - post op   reviewed      Dr. Kenny Whitman has been consulted during this visit and he agrees with the assessment and plan. Patient expresses understanding of the plan. All questions were answered. Patient education provided on post surgical care. Jose Raul Baig has been asked to contact her primary care provider to follow-up on her recommended health maintenance items. REVIEW OF SYSTEMS:     Review of Systems: Chest pain: no  Shortness of breath: no  Fever: no  Night sweats: no  Weight loss: no  Constitutional signs: no  Review of all other systems is negative    Otherwise as noted in HPI      PAST MEDICAL HISTORY:     Past Medical History:   Diagnosis Date    Asthma     Under control. last attack appxox.  12/2019    CAD (coronary artery disease) 1998    \"stent in LAD\"    [address] [address]     squamous cell skin cancer on neck removed approx 2015    First degree AV block     Gastroesophageal reflux disease without esophagitis 6/3/2019    GERD (gastroesophageal reflux disease)     HTN (hypertension)     Hx vulvar dysplasia     Removed 08/2018    Hyperlipidemia     Hypothyroidism     no meds. under control    IC (interstitial cystitis)     Nausea & vomiting     Nausea    Pleural effusion 01/2019    Pneumonia 01/2019       MEDICATIONS:     Current Outpatient Medications   Medication Sig    traMADoL (Ultram) 50 mg tablet Take 1-2 Tabs by mouth every eight (8) hours as needed for Pain for up to 7 days. Max Daily Amount: 300 mg. Indications: pain    ergocalciferol (Vitamin D2) 1,250 mcg (50,000 unit) capsule Take 50,000 Units by mouth.  ascorbic acid, vitamin C, (Vitamin C) 250 mg tablet Take 250 mg by mouth.  B.infantis-B.ani-B.long-B.bifi (Probiotic 4X) 10-15 mg TbEC Take 1 Cap by mouth daily.  promethazine (PHENERGAN) 25 mg tablet Take 1 Tab by mouth every six (6) hours as needed for Nausea.  polyethylene glycol (Miralax) 17 gram packet Take 1 Packet by mouth daily.  metoprolol succinate (TOPROL-XL) 50 mg XL tablet TAKE 1 TABLET BY MOUTH EVERY DAY    ciclopirox (Penlac) 8 % solution Apply 1 Each to affected area nightly.  fluticasone furoate-vilanteroL (Breo Ellipta) 200-25 mcg/dose inhaler Take 1 Puff by inhalation daily.  montelukast (SINGULAIR) 10 mg tablet Take 1 Tab by mouth nightly.  amLODIPine (NORVASC) 5 mg tablet Take 1 Tab by mouth daily.  aspirin delayed-release 81 mg tablet Take 1 Tab by mouth daily. (Patient taking differently: Take 81 mg by mouth daily. Stopped Sunday 09/13/20 for surgery.)    clopidogreL (PLAVIX) 75 mg tab Take 1 Tab by mouth daily. (Patient taking differently: Take 75 mg by mouth daily. Stopped Sunday 09/13/20 for surgery.)    fenofibrate nanocrystallized (TRICOR) 48 mg tablet Take 1 Tab by mouth daily.  losartan (COZAAR) 25 mg tablet Take 1 Tab by mouth daily.     nitroglycerin (NITROSTAT) 0.4 mg SL tablet 1 Tab by SubLINGual route every five (5) minutes as needed for Chest Pain.  simvastatin (ZOCOR) 40 mg tablet Take 1 Tab by mouth nightly.  icosapent ethyL (VASCEPA) 1 gram capsule Take 1 Cap by mouth two (2) times daily (with meals).  zolpidem (AMBIEN) 10 mg tablet Take 10 mg by mouth nightly as needed for Sleep.  diazePAM (VALIUM) 10 mg tablet TAKE 1 TABLET BY MOUTH EVERY DAY AS NEEDED    albuterol (PROVENTIL HFA, VENTOLIN HFA, PROAIR HFA) 90 mcg/actuation inhaler Take 1 Puff by inhalation every six (6) hours as needed for Wheezing.  ondansetron (ZOFRAN ODT) 4 mg disintegrating tablet Take 1 Tab by mouth every eight (8) hours as needed for Nausea.  phenazopyridine (PYRIDIUM) 100 mg tablet Take 100 mg by mouth as needed. As needed     No current facility-administered medications for this visit. ALLERGIES:     Allergies   Allergen Reactions    Latex, Natural Rubber Rash    Adhesive Rash    Bactrim [Sulfamethoprim] Hives    Macrobid [Nitrofurantoin Monohyd/M-Cryst] Nausea and Vomiting    Nsaids (Non-Steroidal Anti-Inflammatory Drug) Other (comments)     Can not take secondary to current plavix and asa use.          PAST SURGICAL HISTORY:     Past Surgical History:   Procedure Laterality Date    CARDIAC SURG PROCEDURE UNLIST  2016 & 2018    HX APPENDECTOMY  1985    HX CHOLECYSTECTOMY  1982    HX CORONARY STENT PLACEMENT  2007    HX CORONARY STENT PLACEMENT      HX HYSTERECTOMY  2015    Complete    HX TUBAL LIGATION Bilateral 1981       SOCIAL HISTORY:     Social History     Socioeconomic History    Marital status:      Spouse name: Not on file    Number of children: Not on file    Years of education: Not on file    Highest education level: Not on file   Occupational History    Not on file   Social Needs    Financial resource strain: Not on file    Food insecurity     Worry: Not on file     Inability: Not on file    Transportation needs     Medical: Not on file Non-medical: Not on file   Tobacco Use    Smoking status: Never Smoker    Smokeless tobacco: Never Used   Substance and Sexual Activity    Alcohol use: Not Currently     Frequency: Monthly or less     Drinks per session: 1 or 2     Binge frequency: Never     Comment: last use 06/2020    Drug use: Never    Sexual activity: Yes   Lifestyle    Physical activity     Days per week: Not on file     Minutes per session: Not on file    Stress: Not on file   Relationships    Social connections     Talks on phone: Not on file     Gets together: Not on file     Attends Synagogue service: Not on file     Active member of club or organization: Not on file     Attends meetings of clubs or organizations: Not on file     Relationship status: Not on file    Intimate partner violence     Fear of current or ex partner: Not on file     Emotionally abused: Not on file     Physically abused: Not on file     Forced sexual activity: Not on file   Other Topics Concern    Not on file   Social History Narrative    Not on file       FAMILY HISTORY:     Family History   Problem Relation Age of Onset    Cancer Mother     Heart Disease Father     Hypertension Father     Elevated Lipids Father     Diabetes Neg Hx     Stroke Neg Hx            Monika Robertson, [facility]  12/15/2020       Disclaimer: Sections of this note are dictated utilizing voice recognition software, which may have resulted in some phonetic based errors in grammar and contents. Even though attempts were made to correct all the mistakes, some may have been missed, and remained in the body of the document. If questions arise, please contact our department.

## 2021-01-04 ENCOUNTER — TELEPHONE (OUTPATIENT)
Dept: ORTHOPEDIC SURGERY | Age: 66
End: 2021-01-04

## 2021-01-04 DIAGNOSIS — M21.619 BUNION OF GREAT TOE: Primary | ICD-10-CM

## 2021-01-04 DIAGNOSIS — M79.672 LEFT FOOT PAIN: ICD-10-CM

## 2021-01-04 DIAGNOSIS — Z98.890 POST-OPERATIVE STATE: ICD-10-CM

## 2021-01-04 DIAGNOSIS — M54.50 ACUTE BILATERAL LOW BACK PAIN WITHOUT SCIATICA: ICD-10-CM

## 2021-01-04 DIAGNOSIS — G62.9 NEUROPATHY: ICD-10-CM

## 2021-01-04 NOTE — TELEPHONE ENCOUNTER
Patient called and is asking for a refill on Tramadol medication from [facility]. [facility] on [address]. [phone]. Patient tel. [phone]. Note : patient has an appt on 1/13/21 with Tru Glynn.

## 2021-01-04 NOTE — TELEPHONE ENCOUNTER
[facility] for Ultram as 12/15/20 for a 7 d/s. Last Visit: 12/15/20 with BARBARA Glynn  Next Appointment: 1/13/21 with BARBARA Glynn  Previous Refill Encounter(s): 12/15/20 #42    Requested Prescriptions     Pending Prescriptions Disp Refills    traMADoL (ULTRAM) 50 mg tablet 42 Tab 0     Sig: Take 1-2 Tabs by mouth every eight (8) hours as needed for Pain for up to 7 days. Max Daily Amount: 300 mg.  Indications: pain

## 2021-01-05 RX ORDER — TRAMADOL HYDROCHLORIDE 50 MG/1
50-100 TABLET ORAL
Qty: 42 TAB | Refills: 0 | Status: SHIPPED | OUTPATIENT
Start: 2021-01-05 | End: 2021-01-12

## 2021-01-05 NOTE — TELEPHONE ENCOUNTER
Patients surgery was September 18, 2020. This will be the last prescription refill of Ultram. Thereafter, patient will need to use OTC pain medication per manufacture directions. The following prescriptions have been e-prescribed to the patients [address] located in [place]:    Orders Placed This Encounter    traMADoL (ULTRAM) 50 mg tablet     Sig: Take 1-2 Tabs by mouth every eight (8) hours as needed for Pain for up to 7 days. Max Daily Amount: 300 mg. THIS PRESCRIPTION WILL NOT BE REFILLED  Indications: pain     Dispense:  42 Tab     Refill:  0       Angela Garcia [facility], [place], PA-C  1/5/2021  8:48 AM

## 2021-01-05 NOTE — TELEPHONE ENCOUNTER
Spoke to the patient and let know the medication has been sent to her pharmacy per BARBARA Glynn. Also, let her that this will be the last rx for Ultram per BARBARA Glynn and the patient agreed and stated that her foot is feeling better.

## 2021-02-03 ENCOUNTER — OFFICE VISIT (OUTPATIENT)
Dept: ORTHOPEDIC SURGERY | Age: 66
End: 2021-02-03
Payer: COMMERCIAL

## 2021-02-03 VITALS
DIASTOLIC BLOOD PRESSURE: 71 MMHG | BODY MASS INDEX: 30.32 KG/M2 | OXYGEN SATURATION: 97 % | TEMPERATURE: 96 F | HEART RATE: 73 BPM | RESPIRATION RATE: 16 BRPM | WEIGHT: 182 LBS | HEIGHT: 65 IN | SYSTOLIC BLOOD PRESSURE: 123 MMHG

## 2021-02-03 DIAGNOSIS — M21.619 BUNION OF GREAT TOE: Primary | ICD-10-CM

## 2021-02-03 DIAGNOSIS — M20.42 HAMMER TOE OF LEFT FOOT: ICD-10-CM

## 2021-02-03 PROCEDURE — G8427 DOCREV CUR MEDS BY ELIG CLIN: HCPCS | Performed by: ORTHOPAEDIC SURGERY

## 2021-02-03 PROCEDURE — G8432 DEP SCR NOT DOC, RNG: HCPCS | Performed by: ORTHOPAEDIC SURGERY

## 2021-02-03 PROCEDURE — G8400 PT W/DXA NO RESULTS DOC: HCPCS | Performed by: ORTHOPAEDIC SURGERY

## 2021-02-03 PROCEDURE — G9899 SCRN MAM PERF RSLTS DOC: HCPCS | Performed by: ORTHOPAEDIC SURGERY

## 2021-02-03 PROCEDURE — 3017F COLORECTAL CA SCREEN DOC REV: CPT | Performed by: ORTHOPAEDIC SURGERY

## 2021-02-03 PROCEDURE — G8752 SYS BP LESS 140: HCPCS | Performed by: ORTHOPAEDIC SURGERY

## 2021-02-03 PROCEDURE — 1101F PT FALLS ASSESS-DOCD LE1/YR: CPT | Performed by: ORTHOPAEDIC SURGERY

## 2021-02-03 PROCEDURE — 1090F PRES/ABSN URINE INCON ASSESS: CPT | Performed by: ORTHOPAEDIC SURGERY

## 2021-02-03 PROCEDURE — G8754 DIAS BP LESS 90: HCPCS | Performed by: ORTHOPAEDIC SURGERY

## 2021-02-03 PROCEDURE — G8417 CALC BMI ABV UP PARAM F/U: HCPCS | Performed by: ORTHOPAEDIC SURGERY

## 2021-02-03 PROCEDURE — G8536 NO DOC ELDER MAL SCRN: HCPCS | Performed by: ORTHOPAEDIC SURGERY

## 2021-02-03 PROCEDURE — 99213 OFFICE O/P EST LOW 20 MIN: CPT | Performed by: ORTHOPAEDIC SURGERY

## 2021-02-03 NOTE — PROGRESS NOTES
AMBULATORY PROGRESS NOTE      Patient: Patrica Lovell             MRN: [id number]     SSN: [id number] Body mass index is 30.29 kg/m². YOB: 1955     AGE: 76 y.o. EX: female    PCP: Mandy Naqvi MD       IMPRESSION //  DIAGNOSIS AND TREATMENT PLAN      DIAGNOSES  1. Bunion of great toe LEFT    2. HAMMER TOES    No orders of the defined types were placed in this encounter. At this point am pleased with her overall alignment, with her having had surgery on her left foot to address a severe bunion deformity multiple hammertoes. Lesser toe digits look quite good, as well as her great toe alignment is much improved. She still cannot fully flex her her second toe fully in order for to touch the ground. There is no recurrence of her bunion deformity    PLAN:    1. Continue wearing PÉREZ shoe or other supportive tennis shoe  2. Encouraged great toe ROM, flexing and bending  3. Follow with [facility] for fungal infection of L great toe    RTO- 4 months,  PLEASE OBTAIN X-RAYS OF: left foot 3 VIEWS.// WB FOOT XRAYS      HPI //  OBJECTIVE EXAMINATION      Patrica Lovell IS A 76 y.o. female who presents to my outpatient office for follow up evaluation of: left foot pain and s/p 4.5 months DOS: 9/18/2020  Left Lapidus Procedure/akin Osteotomy/second, Third And Fourth Hammertoe Pip Resection Arthroplasty/c-arm/lapiplasty Trace Ii System, Bme Hammerlock Implant, Cannulated Screws/nerve Block - Left    At the last OV with BARBARA Jacinto on 12/15/2020, patient was advised to Weight bear as tolerated in supportive shoe with wide toe box. Since the last OV, Patrica Lovell has been doing better and having only mild discomfort in her left forefoot. She presents to the office with PÉREZ shoes. Overall, she is not having any major pain since the surgery.            Visit Vitals  /71   Pulse 73   Temp (!) 96 °F (35.6 °C) (Temporal)   Resp 16   Ht 5' 5\" (1.651 m)   Wt 182 lb (82.6 kg)   SpO2 97% BMI 30.29 kg/m²       ANKLE/FOOT left    Psychiatry: Alert, oriented x 3 (name,place,time of day); speech normal in context and clarity, memory intact grossly, no involuntary movements - tremors, no dementia  Gait: normal  Tenderness: mild along medial great toe with light palpation // non-tender to lesser toes  Cutaneous: WNL  Joint Motion: WNL  Joint / Tendon Stability: No Ankle or Subtalar instability or joint laxity. No peroneal sublux ability or dislocation  Alignment: neutral Hindfoot, none Metatarsus Adductus Metatarsus. Neuro Motor/Sensory: NL/NL,  Vascular: NL foot/ankle pulses,   Lymphatics: No extremity lymphedema, No calf swelling, no tenderness to calf muscles. CHART REVIEW     Patient Active Problem List   Diagnosis Code    Asthma J45.909    Hypertension I10    CAD (coronary artery disease) I25.10    Hyperlipidemia E78.5    IC (interstitial cystitis) N30.10    Presence of stent in LAD coronary artery Z95.5    Acute coronary syndrome (HCC) I24.9    ACS (acute coronary syndrome) (HCC) I24.9    LV dysfunction I51.9    Atypical chest pain R07.89    Coronary artery disease of bypass graft of native heart with stable angina pectoris (HCC) I25.708    Gastroesophageal reflux disease without esophagitis K21.9    Bunion of great toe M21.619        Kathleen Toney has been experiencing pain and discomfort confirmed as outlined in the pain assessment outlined below.       Pain Assessment  2/3/2021   Location of Pain Foot   Location Modifiers Left   Severity of Pain 0   Quality of Pain -   Quality of Pain Comment -   Duration of Pain -   Frequency of Pain -   Aggravating Factors -   Aggravating Factors Comment -   Limiting Behavior -   Relieving Factors -   Relieving Factors Comment -   Result of Injury -        Kathleen Toney  has a past medical history of Asthma, CAD (coronary artery disease) (1998), Cancer ([facility] [address] First degree AV block, Gastroesophageal reflux disease without esophagitis (6/3/2019), GERD (gastroesophageal reflux disease), HTN (hypertension), vulvar dysplasia, Hyperlipidemia, Hypothyroidism, IC (interstitial cystitis), Nausea & vomiting, Pleural effusion (01/2019), and Pneumonia (01/2019). Patients is employed at:         Past Medical History:   Diagnosis Date    Asthma     Under control. last attack appxox. 12/2019    CAD (coronary artery disease) 1998    \"stent in LAD\"    Cancer ([facility] [address]     squamous cell skin cancer on neck removed approx 2015    First degree AV block     Gastroesophageal reflux disease without esophagitis 6/3/2019    GERD (gastroesophageal reflux disease)     HTN (hypertension)     Hx vulvar dysplasia     Removed 08/2018    Hyperlipidemia     Hypothyroidism     no meds. under control    IC (interstitial cystitis)     Nausea & vomiting     Nausea    Pleural effusion 01/2019    Pneumonia 01/2019     Past Surgical History:   Procedure Laterality Date    HX APPENDECTOMY  1985    HX CHOLECYSTECTOMY  1982    HX CORONARY STENT PLACEMENT  2007    HX CORONARY STENT PLACEMENT      HX HYSTERECTOMY  2015    Complete    HX TUBAL LIGATION Bilateral 1981    [place] CARDIAC SURG PROCEDURE UNLIST  2016 & 2018     Current Outpatient Medications   Medication Sig    ergocalciferol (Vitamin D2) 1,250 mcg (50,000 unit) capsule Take 50,000 Units by mouth.  metoprolol succinate (TOPROL-XL) 50 mg XL tablet TAKE 1 TABLET BY MOUTH EVERY DAY    fluticasone furoate-vilanteroL (Breo Ellipta) 200-25 mcg/dose inhaler Take 1 Puff by inhalation daily.  montelukast (SINGULAIR) 10 mg tablet Take 1 Tab by mouth nightly.  amLODIPine (NORVASC) 5 mg tablet Take 1 Tab by mouth daily.  aspirin delayed-release 81 mg tablet Take 1 Tab by mouth daily. (Patient taking differently: Take 81 mg by mouth daily. Stopped Sunday 09/13/20 for surgery.)    losartan (COZAAR) 25 mg tablet Take 1 Tab by mouth daily.     nitroglycerin (NITROSTAT) 0.4 mg SL tablet 1 Tab by SubLINGual route every five (5) minutes as needed for Chest Pain.  simvastatin (ZOCOR) 40 mg tablet Take 1 Tab by mouth nightly.  zolpidem (AMBIEN) 10 mg tablet Take 10 mg by mouth nightly as needed for Sleep.  diazePAM (VALIUM) 10 mg tablet TAKE 1 TABLET BY MOUTH EVERY DAY AS NEEDED    albuterol (PROVENTIL HFA, VENTOLIN HFA, PROAIR HFA) 90 mcg/actuation inhaler Take 1 Puff by inhalation every six (6) hours as needed for Wheezing.  ondansetron (ZOFRAN ODT) 4 mg disintegrating tablet Take 1 Tab by mouth every eight (8) hours as needed for Nausea.  phenazopyridine (PYRIDIUM) 100 mg tablet Take 100 mg by mouth as needed. As needed    ascorbic acid, vitamin C, (Vitamin C) 250 mg tablet Take 250 mg by mouth.  B.infantis-B.ani-B.long-B.bifi (Probiotic 4X) 10-15 mg TbEC Take 1 Cap by mouth daily.  promethazine (PHENERGAN) 25 mg tablet Take 1 Tab by mouth every six (6) hours as needed for Nausea.  polyethylene glycol (Miralax) 17 gram packet Take 1 Packet by mouth daily.  ciclopirox (Penlac) 8 % solution Apply 1 Each to affected area nightly.  clopidogreL (PLAVIX) 75 mg tab Take 1 Tab by mouth daily. (Patient taking differently: Take 75 mg by mouth daily. Stopped Sunday 09/13/20 for surgery.)    fenofibrate nanocrystallized (TRICOR) 48 mg tablet Take 1 Tab by mouth daily.  icosapent ethyL (VASCEPA) 1 gram capsule Take 1 Cap by mouth two (2) times daily (with meals). No current facility-administered medications for this visit. Allergies   Allergen Reactions    Latex, Natural Rubber Rash    Adhesive Rash    Bactrim [Sulfamethoprim] Hives    Macrobid [Nitrofurantoin Monohyd/M-Cryst] Nausea and Vomiting    Nsaids (Non-Steroidal Anti-Inflammatory Drug) Other (comments)     Can not take secondary to current plavix and asa use.      Social History     Occupational History    Not on file   Tobacco Use    Smoking status: Never Smoker    Smokeless tobacco: Never Used Substance and Sexual Activity    Alcohol use: Not Currently     Frequency: Monthly or less     Drinks per session: 1 or 2     Binge frequency: Never     Comment: last use 06/2020    Drug use: Never    Sexual activity: Yes     Family History   Problem Relation Age of Onset    Cancer Mother     Heart Disease Father     Hypertension Father     Elevated Lipids Father     Diabetes Neg Hx     Stroke Neg Hx        THE  Mauricio Hummel MD 2/3/2021 . DIAGNOSTIC IMAGING  LAB DATA      Lab Results   Component Value Date/Time    Hemoglobin A1c 5.4 06/14/2018 09:51 AM    //   Lab Results   Component Value Date/Time    Glucose 86 08/17/2020 01:53 PM    Glucose (POC) 100 09/27/2018 11:45 AM        No results found for: [id number], [id number]      Lab Results   Component Value Date/Time    Vitamin D 25-Hydroxy 40.7 08/17/2020 01:53 PM         REVIEW OF SYSTEMS : 2/3/2021  ALL BELOW ARE Negative except : SEE HPI     CONSTITUTIONAL: No weight loss  PSYCHOLOGICAL : No Feelings of anxiety, depression, agitation  EYES: No blurred vision and no eye discharge. NO eye pain, double vision  ENT: No nasal discharge. No ear pain. CARDIOVASCULAR: No chest pain and no diaphoresis. RESPIRATORY: No cough, no hemoptysis. GI: No vomiting, no diarrhea   : No urinary frequency and no dysuria. MUSCULOSKELETAL: see HPI  SKIN: No rashes. NEURO:  No dizziness,weakness, headaches// No visual changes or confusion, or seizures,   ENDOCRINE: No polyphagia and no polydipsia. HEMATOLOGY: No bleeding tendencies. DIAGNOSTIC IMAGING      NONE TODAY      I have personally reviewed the results of the above study. The interpretation of this study is my professional opinion.       Ailyn Qiu MD  2/3/2021  3:12 PM

## 2021-02-22 ENCOUNTER — TRANSCRIBE ORDER (OUTPATIENT)
Dept: SCHEDULING | Age: 66
End: 2021-02-22

## 2021-02-22 DIAGNOSIS — Z12.31 ENCOUNTER FOR SCREENING MAMMOGRAM FOR BREAST CANCER: Primary | ICD-10-CM

## 2021-03-05 ENCOUNTER — TRANSCRIBE ORDER (OUTPATIENT)
Dept: SCHEDULING | Age: 66
End: 2021-03-05

## 2021-03-05 DIAGNOSIS — N63.10 MASS OF RIGHT BREAST: Primary | ICD-10-CM

## 2021-03-10 ENCOUNTER — TRANSCRIBE ORDER (OUTPATIENT)
Dept: SCHEDULING | Age: 66
End: 2021-03-10

## 2021-03-10 DIAGNOSIS — N64.89 BREAST ASYMMETRY: ICD-10-CM

## 2021-03-10 DIAGNOSIS — N63.10 LUMP OF RIGHT BREAST: Primary | ICD-10-CM

## 2021-03-10 DIAGNOSIS — N63.0 PALPABLE MASS OF BREAST: ICD-10-CM

## 2021-03-17 ENCOUNTER — HOSPITAL ENCOUNTER (OUTPATIENT)
Dept: MAMMOGRAPHY | Age: 66
Discharge: HOME | End: 2021-03-17
Attending: FAMILY MEDICINE
Payer: COMMERCIAL

## 2021-03-17 ENCOUNTER — HOSPITAL ENCOUNTER (OUTPATIENT)
Dept: ULTRASOUND IMAGING | Age: 66
Discharge: HOME | End: 2021-03-17
Attending: FAMILY MEDICINE
Payer: COMMERCIAL

## 2021-03-17 DIAGNOSIS — N63.0 PALPABLE MASS OF BREAST: ICD-10-CM

## 2021-03-17 DIAGNOSIS — N63.10 MASS OF RIGHT BREAST: ICD-10-CM

## 2021-03-17 DIAGNOSIS — N64.89 BREAST ASYMMETRY: ICD-10-CM

## 2021-03-17 DIAGNOSIS — N63.10 LUMP OF RIGHT BREAST: ICD-10-CM

## 2021-03-17 PROCEDURE — 77062 BREAST TOMOSYNTHESIS BI: CPT

## 2021-03-17 PROCEDURE — 76642 ULTRASOUND BREAST LIMITED: CPT

## 2021-03-18 RX ORDER — SIMVASTATIN 40 MG/1
TABLET, FILM COATED ORAL
Qty: 90 TAB | Refills: 2 | Status: SHIPPED | OUTPATIENT
Start: 2021-03-18 | End: 2021-04-01 | Stop reason: ALTCHOICE

## 2021-03-23 DIAGNOSIS — I25.118 CORONARY ARTERY DISEASE OF NATIVE ARTERY OF NATIVE HEART WITH STABLE ANGINA PECTORIS (HCC): Primary | ICD-10-CM

## 2021-03-23 DIAGNOSIS — E78.5 HYPERLIPIDEMIA, UNSPECIFIED HYPERLIPIDEMIA TYPE: ICD-10-CM

## 2021-03-26 ENCOUNTER — HOSPITAL ENCOUNTER (OUTPATIENT)
Dept: LAB | Age: 66
Discharge: HOME | End: 2021-03-26
Payer: COMMERCIAL

## 2021-03-26 LAB
ALBUMIN SERPL-MCNC: 3.6 G/DL (ref 3.4–5)
ALBUMIN/GLOB SERPL: 1.2 {RATIO} (ref 0.8–1.7)
ALP SERPL-CCNC: 70 U/L (ref 45–117)
ALT SERPL-CCNC: 23 U/L (ref 13–56)
AST SERPL-CCNC: 18 U/L (ref 10–38)
BILIRUB DIRECT SERPL-MCNC: 0.2 MG/DL (ref 0–0.2)
BILIRUB SERPL-MCNC: 0.7 MG/DL (ref 0.2–1)
CHOLEST SERPL-MCNC: 167 MG/DL
GLOBULIN SER CALC-MCNC: 3 G/DL (ref 2–4)
HDLC SERPL-MCNC: 51 MG/DL (ref 40–60)
HDLC SERPL: 3.3 {RATIO} (ref 0–5)
LDLC SERPL CALC-MCNC: 72.8 MG/DL (ref 0–100)
LIPID PROFILE,FLP: ABNORMAL
PROT SERPL-MCNC: 6.6 G/DL (ref 6.4–8.2)
TRIGL SERPL-MCNC: 216 MG/DL (ref ?–150)
VLDLC SERPL CALC-MCNC: 43.2 MG/DL

## 2021-03-26 PROCEDURE — 80076 HEPATIC FUNCTION PANEL: CPT

## 2021-03-26 PROCEDURE — 36415 COLL VENOUS BLD VENIPUNCTURE: CPT

## 2021-03-26 PROCEDURE — 80061 LIPID PANEL: CPT

## 2021-04-01 ENCOUNTER — OFFICE VISIT (OUTPATIENT)
Dept: CARDIOLOGY CLINIC | Age: 66
End: 2021-04-01
Payer: MEDICARE

## 2021-04-01 VITALS
DIASTOLIC BLOOD PRESSURE: 73 MMHG | HEART RATE: 63 BPM | BODY MASS INDEX: 30.29 KG/M2 | TEMPERATURE: 97 F | SYSTOLIC BLOOD PRESSURE: 135 MMHG | OXYGEN SATURATION: 97 % | HEIGHT: 65 IN

## 2021-04-01 DIAGNOSIS — E78.5 HYPERLIPIDEMIA, UNSPECIFIED HYPERLIPIDEMIA TYPE: ICD-10-CM

## 2021-04-01 DIAGNOSIS — R53.83 FATIGUE, UNSPECIFIED TYPE: ICD-10-CM

## 2021-04-01 DIAGNOSIS — I25.118 CORONARY ARTERY DISEASE OF NATIVE ARTERY OF NATIVE HEART WITH STABLE ANGINA PECTORIS (HCC): Primary | ICD-10-CM

## 2021-04-01 DIAGNOSIS — I10 ESSENTIAL HYPERTENSION: ICD-10-CM

## 2021-04-01 DIAGNOSIS — Z95.5 PRESENCE OF STENT IN LAD CORONARY ARTERY: ICD-10-CM

## 2021-04-01 PROCEDURE — G8400 PT W/DXA NO RESULTS DOC: HCPCS | Performed by: INTERNAL MEDICINE

## 2021-04-01 PROCEDURE — G8428 CUR MEDS NOT DOCUMENT: HCPCS | Performed by: INTERNAL MEDICINE

## 2021-04-01 PROCEDURE — 1101F PT FALLS ASSESS-DOCD LE1/YR: CPT | Performed by: INTERNAL MEDICINE

## 2021-04-01 PROCEDURE — G9899 SCRN MAM PERF RSLTS DOC: HCPCS | Performed by: INTERNAL MEDICINE

## 2021-04-01 PROCEDURE — G8536 NO DOC ELDER MAL SCRN: HCPCS | Performed by: INTERNAL MEDICINE

## 2021-04-01 PROCEDURE — 3017F COLORECTAL CA SCREEN DOC REV: CPT | Performed by: INTERNAL MEDICINE

## 2021-04-01 PROCEDURE — G8752 SYS BP LESS 140: HCPCS | Performed by: INTERNAL MEDICINE

## 2021-04-01 PROCEDURE — 1090F PRES/ABSN URINE INCON ASSESS: CPT | Performed by: INTERNAL MEDICINE

## 2021-04-01 PROCEDURE — G8432 DEP SCR NOT DOC, RNG: HCPCS | Performed by: INTERNAL MEDICINE

## 2021-04-01 PROCEDURE — 99214 OFFICE O/P EST MOD 30 MIN: CPT | Performed by: INTERNAL MEDICINE

## 2021-04-01 PROCEDURE — G8417 CALC BMI ABV UP PARAM F/U: HCPCS | Performed by: INTERNAL MEDICINE

## 2021-04-01 PROCEDURE — G8754 DIAS BP LESS 90: HCPCS | Performed by: INTERNAL MEDICINE

## 2021-04-01 RX ORDER — CLOPIDOGREL BISULFATE 75 MG/1
75 TABLET ORAL DAILY
Qty: 90 TAB | Refills: 2 | Status: SHIPPED | OUTPATIENT
Start: 2021-04-01 | End: 2021-06-08 | Stop reason: SDUPTHER

## 2021-04-01 RX ORDER — ATORVASTATIN CALCIUM 80 MG/1
80 TABLET, FILM COATED ORAL
Qty: 90 TAB | Refills: 2 | Status: SHIPPED | OUTPATIENT
Start: 2021-04-01 | End: 2022-02-17

## 2021-04-01 RX ORDER — ATORVASTATIN CALCIUM 40 MG/1
TABLET, FILM COATED ORAL DAILY
COMMUNITY
End: 2021-04-01 | Stop reason: DRUGHIGH

## 2021-04-01 NOTE — PROGRESS NOTES
HISTORY OF PRESENT ILLNESS  Denisha Bates is a 76 y.o. female. Hypertension  The history is provided by the patient. This is a chronic problem. The current episode started more than 1 week ago. Associated symptoms include shortness of breath. Pertinent negatives include no chest pain. Shortness of Breath  The history is provided by the patient. This is a chronic problem. The problem occurs intermittently. The current episode started more than 1 week ago. The problem has not changed since onset. Pertinent negatives include no ear pain, no neck pain, no wheezing, no chest pain, no syncope, no rash and no leg swelling. She has had prior hospitalizations. She has had prior ED visits. Associated medical issues include CAD. Associated medical issues do not include heart failure. Palpitations   The history is provided by the patient. This is a recurrent problem. The problem occurs every several days. Associated symptoms include shortness of breath. Pertinent negatives include no chest pain and no syncope. Her past medical history is significant for hypertension. Follow-up  The history is provided by the patient. This is a chronic problem. Associated symptoms include shortness of breath. Pertinent negatives include no chest pain. Review of Systems   Constitutional: Negative for chills and weight loss. HENT: Negative for congestion, ear discharge, ear pain, hearing loss, nosebleeds and tinnitus. Eyes: Negative for blurred vision. Respiratory: Positive for shortness of breath. Negative for wheezing and stridor. Cardiovascular: Positive for palpitations. Negative for chest pain, leg swelling and syncope. Gastrointestinal: Negative for heartburn. Musculoskeletal: Negative for myalgias and neck pain. Skin: Negative for itching and rash. Neurological: Negative for tingling, tremors, focal weakness and loss of consciousness. Psychiatric/Behavioral: Negative for depression and suicidal ideas. Family History   Problem Relation Age of Onset    Cancer Mother     Heart Disease Father     Hypertension Father     Elevated Lipids Father     Diabetes Neg Hx     Stroke Neg Hx        Past Medical History:   Diagnosis Date    Asthma     Under control. last attack appxox. 12/2019    CAD (coronary artery disease) 1998    \"stent in LAD\"    Cancer ([address]     squamous cell skin cancer on neck removed approx 2015    First degree AV block     Gastroesophageal reflux disease without esophagitis 6/3/2019    GERD (gastroesophageal reflux disease)     HTN (hypertension)     Hx vulvar dysplasia     Removed 08/2018    Hyperlipidemia     Hypothyroidism     no meds. under control    IC (interstitial cystitis)     Nausea & vomiting     Nausea    Pleural effusion 01/2019    Pneumonia 01/2019       Past Surgical History:   Procedure Laterality Date    HX APPENDECTOMY  1985    HX CHOLECYSTECTOMY  1982    HX CORONARY STENT PLACEMENT  2007    HX CORONARY STENT PLACEMENT      HX HYSTERECTOMY  2015    Complete    HX TUBAL LIGATION Bilateral 1981    [place] CARDIAC SURG PROCEDURE UNLIST  2016 & 2018       Social History     Tobacco Use    Smoking status: Never Smoker    Smokeless tobacco: Never Used   Substance Use Topics    Alcohol use: Not Currently     Frequency: Monthly or less     Drinks per session: 1 or 2     Binge frequency: Never     Comment: last use 06/2020       Allergies   Allergen Reactions    Latex, Natural Rubber Rash    Adhesive Rash    Bactrim [Sulfamethoprim] Hives    Macrobid [Nitrofurantoin Monohyd/M-Cryst] Nausea and Vomiting    Nsaids (Non-Steroidal Anti-Inflammatory Drug) Other (comments)     Can not take secondary to current plavix and asa use. Outpatient Medications Marked as Taking for the 4/1/21 encounter (Office Visit) with Deyvi Atkinson MD   Medication Sig Dispense Refill    atorvastatin (LIPITOR) 80 mg tablet Take 1 Tab by mouth nightly.  90 Tab 2    clopidogreL (Plavix) 75 mg tab Take 1 Tab by mouth daily. 90 Tab 2    ergocalciferol (Vitamin D2) 1,250 mcg (50,000 unit) capsule Take 50,000 Units by mouth.  metoprolol succinate (TOPROL-XL) 50 mg XL tablet TAKE 1 TABLET BY MOUTH EVERY DAY 90 Tab 3    fluticasone furoate-vilanteroL (Breo Ellipta) 200-25 mcg/dose inhaler Take 1 Puff by inhalation daily. 3 Inhaler 1    montelukast (SINGULAIR) 10 mg tablet Take 1 Tab by mouth nightly. (Patient taking differently: Take 10 mg by mouth nightly. As needed) 90 Tab 1    amLODIPine (NORVASC) 5 mg tablet Take 1 Tab by mouth daily. 90 Tab 1    nitroglycerin (NITROSTAT) 0.4 mg SL tablet 1 Tab by SubLINGual route every five (5) minutes as needed for Chest Pain. 25 Tab 1    zolpidem (AMBIEN) 10 mg tablet Take 10 mg by mouth nightly as needed for Sleep.  diazePAM (VALIUM) 10 mg tablet TAKE 1 TABLET BY MOUTH EVERY DAY AS NEEDED  4    albuterol (PROVENTIL HFA, VENTOLIN HFA, PROAIR HFA) 90 mcg/actuation inhaler Take 1 Puff by inhalation every six (6) hours as needed for Wheezing. 1 Inhaler 3    ondansetron (ZOFRAN ODT) 4 mg disintegrating tablet Take 1 Tab by mouth every eight (8) hours as needed for Nausea. 14 Tab 0    phenazopyridine (PYRIDIUM) 100 mg tablet Take 100 mg by mouth as needed. As needed          Visit Vitals  /73 (BP 1 Location: Left arm, BP Patient Position: Sitting, BP Cuff Size: Adult)   Pulse 63   Temp 97 °F (36.1 °C) (Temporal)   Ht 5' 5\" (1.651 m)   SpO2 97%   BMI 30.29 kg/m²     Physical Exam   Constitutional: She is oriented to person, place, and time. She appears well-developed and well-nourished. No distress. HENT:   Head: Atraumatic. Mouth/Throat: No oropharyngeal exudate. Eyes: Conjunctivae are normal. Right eye exhibits no discharge. Left eye exhibits no discharge. No scleral icterus. Neck: Normal range of motion. Neck supple. No JVD present. No tracheal deviation present. No thyromegaly present.    Cardiovascular: Normal rate, regular rhythm and normal heart sounds. Exam reveals no gallop. No murmur heard. Pulmonary/Chest: Effort normal and breath sounds normal. No stridor. No respiratory distress. She has no wheezes. She has no rales. She exhibits no tenderness. Abdominal: Soft. There is no abdominal tenderness. There is no rebound. Musculoskeletal: Normal range of motion. General: No tenderness or edema. Lymphadenopathy:     She has no cervical adenopathy. Neurological: She is alert and oriented to person, place, and time. She exhibits normal muscle tone. Skin: Skin is warm. She is not diaphoretic. Psychiatric: She has a normal mood and affect. Her behavior is normal.     ekg sinus rhythm with t wave inversion in anterior and inferior leads. Lexiscan 10/2017:  CONCLUSION:  1. Normal perfusion scan. 2. Normal wall motion and ejection fraction. 02/20/19   ECHO ADULT COMPLETE 02/20/2019 2/20/2019    Narrative · Estimated left ventricular ejection fraction is 61 - 65%. Left   ventricular mild concentric hypertrophy. Normal left ventricular wall   motion, no regional wall motion abnormality noted. Mild (grade 1) left   ventricular diastolic dysfunction. · Mild mitral annular calcification. Trace mitral valve regurgitation. · Mild aortic valve sclerosis. Mild aortic valve regurgitation is present. · Trace tricuspid valve regurgitation. Pulmonary arterial systolic   pressure is 04.6 mmHg. There is no evidence of pulmonary hypertension. · Mild pulmonic valve regurgitation is present. Signed by: Monster Collier MD     ASSESSMENT and PLAN    ICD-10-CM ICD-9-CM    1. Coronary artery disease of native artery of native heart with stable angina pectoris ([facility] [address]  I25.118 414.01      413.9    2. Presence of stent in LAD coronary artery  Z95.5 V45.82    3. Essential hypertension  I10 401.9    4. Hyperlipidemia, unspecified hyperlipidemia type  E78.5 272.4 LIPID PANEL      HEPATIC FUNCTION PANEL   5. Fatigue, unspecified type  R53.83 780.79      Orders Placed This Encounter    LIPID PANEL     Standing Status:   Future     Standing Expiration Date:   4/2/2022    HEPATIC FUNCTION PANEL     Standing Status:   Future     Standing Expiration Date:   4/2/2022    atorvastatin (LIPITOR) 80 mg tablet     Sig: Take 1 Tab by mouth nightly. Dispense:  90 Tab     Refill:  2    clopidogreL (Plavix) 75 mg tab     Sig: Take 1 Tab by mouth daily. Dispense:  90 Tab     Refill:  2     Follow-up and Dispositions    · Return in about 6 months (around 10/1/2021). cardiovascular risk and specific lipid/LDL goals reviewed  use of aspirin to prevent MI and TIA's discussed. Patient with history of CAD status post PCI to mid LAD followed by recent PTCA for in-stent stenosis. Reports episodes of palpitations. Thinks that she has atrial fibrillation based on her Apple Watch tracings. Unable to confirm rhythm. EKG today showed sinus rhythm. Has chronic fatigue. No chest pain. Currently on lipitor 40 mg and zocor 40 mg qhs-- LDL 72- will discontinue zocor and increase lipitor to 80 mg qhs.  F/u in 6 months.

## 2021-04-01 NOTE — PROGRESS NOTES
1. Have you been to the ER, urgent care clinic since your last visit? Hospitalized since your last visit? No    2. Have you seen or consulted any other health care providers outside of the [address] [place] [address] since your last visit? Include any pap smears or colon screening.  No

## 2021-04-23 ENCOUNTER — TELEPHONE (OUTPATIENT)
Dept: CARDIOLOGY CLINIC | Age: 66
End: 2021-04-23

## 2021-04-23 DIAGNOSIS — I25.118 CORONARY ARTERY DISEASE OF NATIVE ARTERY OF NATIVE HEART WITH STABLE ANGINA PECTORIS (HCC): Primary | ICD-10-CM

## 2021-04-30 NOTE — TELEPHONE ENCOUNTER
Patient stated that on her last visit that you recommended that she find a closer cardiologist due to living in [facility].

## 2021-06-08 ENCOUNTER — OFFICE VISIT (OUTPATIENT)
Dept: CARDIOLOGY CLINIC | Age: 66
End: 2021-06-08
Payer: COMMERCIAL

## 2021-06-08 VITALS
HEIGHT: 65 IN | BODY MASS INDEX: 30.29 KG/M2 | HEART RATE: 61 BPM | OXYGEN SATURATION: 96 % | SYSTOLIC BLOOD PRESSURE: 155 MMHG | DIASTOLIC BLOOD PRESSURE: 78 MMHG

## 2021-06-08 DIAGNOSIS — Z95.5 PRESENCE OF STENT IN LAD CORONARY ARTERY: ICD-10-CM

## 2021-06-08 DIAGNOSIS — I10 ESSENTIAL HYPERTENSION: ICD-10-CM

## 2021-06-08 DIAGNOSIS — I25.118 CORONARY ARTERY DISEASE OF NATIVE ARTERY OF NATIVE HEART WITH STABLE ANGINA PECTORIS (HCC): Primary | ICD-10-CM

## 2021-06-08 DIAGNOSIS — E78.5 HYPERLIPIDEMIA, UNSPECIFIED HYPERLIPIDEMIA TYPE: ICD-10-CM

## 2021-06-08 PROCEDURE — G8428 CUR MEDS NOT DOCUMENT: HCPCS | Performed by: INTERNAL MEDICINE

## 2021-06-08 PROCEDURE — G9899 SCRN MAM PERF RSLTS DOC: HCPCS | Performed by: INTERNAL MEDICINE

## 2021-06-08 PROCEDURE — 3017F COLORECTAL CA SCREEN DOC REV: CPT | Performed by: INTERNAL MEDICINE

## 2021-06-08 PROCEDURE — G8417 CALC BMI ABV UP PARAM F/U: HCPCS | Performed by: INTERNAL MEDICINE

## 2021-06-08 PROCEDURE — G8432 DEP SCR NOT DOC, RNG: HCPCS | Performed by: INTERNAL MEDICINE

## 2021-06-08 PROCEDURE — G8536 NO DOC ELDER MAL SCRN: HCPCS | Performed by: INTERNAL MEDICINE

## 2021-06-08 PROCEDURE — 1101F PT FALLS ASSESS-DOCD LE1/YR: CPT | Performed by: INTERNAL MEDICINE

## 2021-06-08 PROCEDURE — 99214 OFFICE O/P EST MOD 30 MIN: CPT | Performed by: INTERNAL MEDICINE

## 2021-06-08 PROCEDURE — G8754 DIAS BP LESS 90: HCPCS | Performed by: INTERNAL MEDICINE

## 2021-06-08 PROCEDURE — G8753 SYS BP > OR = 140: HCPCS | Performed by: INTERNAL MEDICINE

## 2021-06-08 PROCEDURE — 1090F PRES/ABSN URINE INCON ASSESS: CPT | Performed by: INTERNAL MEDICINE

## 2021-06-08 PROCEDURE — G8400 PT W/DXA NO RESULTS DOC: HCPCS | Performed by: INTERNAL MEDICINE

## 2021-06-08 RX ORDER — DICYCLOMINE HYDROCHLORIDE 10 MG/1
10 CAPSULE ORAL AS NEEDED
COMMUNITY
Start: 2021-02-10 | End: 2022-06-03

## 2021-06-16 NOTE — PROGRESS NOTES
HISTORY OF PRESENT ILLNESS  Glenis Epstein is a 76 y.o. female. Follow-up  The history is provided by the patient. This is a chronic problem. Associated symptoms include shortness of breath. Pertinent negatives include no chest pain. Hypertension  The history is provided by the patient. This is a chronic problem. The current episode started more than 1 week ago. Associated symptoms include shortness of breath. Pertinent negatives include no chest pain. Shortness of Breath  The history is provided by the patient. This is a chronic problem. The problem occurs frequently. The current episode started more than 1 week ago. The problem has been gradually worsening. Pertinent negatives include no ear pain, no neck pain, no wheezing, no chest pain, no syncope, no rash and no leg swelling. She has had prior hospitalizations. She has had prior ED visits. Associated medical issues include CAD. Associated medical issues do not include heart failure. Palpitations   The history is provided by the patient. This is a recurrent problem. The problem occurs every several days. Associated symptoms include shortness of breath. Pertinent negatives include no chest pain and no syncope. Her past medical history is significant for hypertension. Review of Systems   Constitutional: Negative for chills and weight loss. HENT: Negative for congestion, ear discharge, ear pain, hearing loss, nosebleeds and tinnitus. Eyes: Negative for blurred vision. Respiratory: Positive for shortness of breath. Negative for wheezing and stridor. Cardiovascular: Positive for palpitations. Negative for chest pain, leg swelling and syncope. Gastrointestinal: Negative for heartburn. Musculoskeletal: Negative for myalgias and neck pain. Skin: Negative for itching and rash. Neurological: Negative for tingling, tremors, focal weakness and loss of consciousness. Psychiatric/Behavioral: Negative for depression and suicidal ideas. Family History   Problem Relation Age of Onset    Cancer Mother     Heart Disease Father     Hypertension Father     Elevated Lipids Father     Diabetes Neg Hx     Stroke Neg Hx        Past Medical History:   Diagnosis Date    Asthma     Under control. last attack appxox. 12/2019    CAD (coronary artery disease) 1998    \"stent in LAD\"    Cancer ([address]     squamous cell skin cancer on neck removed approx 2015    First degree AV block     Gastroesophageal reflux disease without esophagitis 6/3/2019    GERD (gastroesophageal reflux disease)     HTN (hypertension)     Hx vulvar dysplasia     Removed 08/2018    Hyperlipidemia     Hypothyroidism     no meds. under control    IC (interstitial cystitis)     Nausea & vomiting     Nausea    Pleural effusion 01/2019    Pneumonia 01/2019       Past Surgical History:   Procedure Laterality Date    HX APPENDECTOMY  1985    HX CHOLECYSTECTOMY  1982    HX CORONARY STENT PLACEMENT  2007    HX CORONARY STENT PLACEMENT      HX HYSTERECTOMY  2015    Complete    HX TUBAL LIGATION Bilateral 1981    [place] CARDIAC SURG PROCEDURE UNLIST  2016 & 2018       Social History     Tobacco Use    Smoking status: Never Smoker    Smokeless tobacco: Never Used   Substance Use Topics    Alcohol use: Not Currently     Comment: last use 06/2020       Allergies   Allergen Reactions    Latex, Natural Rubber Rash    Adhesive Rash    Bactrim [Sulfamethoprim] Hives    Macrobid [Nitrofurantoin Monohyd/M-Cryst] Nausea and Vomiting    Nsaids (Non-Steroidal Anti-Inflammatory Drug) Other (comments)     Can not take secondary to current plavix and asa use. Outpatient Medications Marked as Taking for the 6/8/21 encounter (Office Visit) with Carlota Wyatt MD   Medication Sig Dispense Refill    dicyclomine (BENTYL) 10 mg capsule Take 10 mg by mouth as needed.  atorvastatin (LIPITOR) 80 mg tablet Take 1 Tab by mouth nightly.  90 Tab 2    ergocalciferol (Vitamin D2) 1,250 mcg (50,000 unit) capsule Take 50,000 Units by mouth.  metoprolol succinate (TOPROL-XL) 50 mg XL tablet TAKE 1 TABLET BY MOUTH EVERY DAY 90 Tab 3    montelukast (SINGULAIR) 10 mg tablet Take 1 Tab by mouth nightly. (Patient taking differently: Take 10 mg by mouth nightly. As needed) 90 Tab 1    amLODIPine (NORVASC) 5 mg tablet Take 1 Tab by mouth daily. 90 Tab 1    aspirin delayed-release 81 mg tablet Take 1 Tab by mouth daily. (Patient taking differently: Take 81 mg by mouth daily. Stopped Sunday 09/13/20 for surgery.) 90 Tab 3    clopidogreL (PLAVIX) 75 mg tab Take 1 Tab by mouth daily. 90 Tab 2    nitroglycerin (NITROSTAT) 0.4 mg SL tablet 1 Tab by SubLINGual route every five (5) minutes as needed for Chest Pain. 25 Tab 1    zolpidem (AMBIEN) 10 mg tablet Take 10 mg by mouth nightly as needed for Sleep.  diazePAM (VALIUM) 10 mg tablet TAKE 1 TABLET BY MOUTH EVERY DAY AS NEEDED  4    albuterol (PROVENTIL HFA, VENTOLIN HFA, PROAIR HFA) 90 mcg/actuation inhaler Take 1 Puff by inhalation every six (6) hours as needed for Wheezing. 1 Inhaler 3    ondansetron (ZOFRAN ODT) 4 mg disintegrating tablet Take 1 Tab by mouth every eight (8) hours as needed for Nausea. 14 Tab 0    phenazopyridine (PYRIDIUM) 100 mg tablet Take 100 mg by mouth as needed. As needed          Visit Vitals  BP (!) 155/78 (BP 1 Location: Right arm, BP Patient Position: Sitting, BP Cuff Size: Large adult)   Pulse 61   Ht 5' 5\" (1.651 m)   SpO2 96%   BMI 30.29 kg/m²     Physical Exam  Constitutional:       General: She is not in acute distress. Appearance: She is well-developed. She is not diaphoretic. HENT:      Head: Atraumatic. Mouth/Throat:      Pharynx: No oropharyngeal exudate. Eyes:      General: No scleral icterus. Right eye: No discharge. Left eye: No discharge. Conjunctiva/sclera: Conjunctivae normal.   Neck:      Thyroid: No thyromegaly. Vascular: No JVD.       Trachea: No tracheal deviation. Cardiovascular:      Rate and Rhythm: Normal rate and regular rhythm. Heart sounds: Normal heart sounds. No murmur heard. No gallop. Pulmonary:      Effort: Pulmonary effort is normal. No respiratory distress. Breath sounds: Normal breath sounds. No stridor. No wheezing or rales. Chest:      Chest wall: No tenderness. Abdominal:      Palpations: Abdomen is soft. Tenderness: There is no abdominal tenderness. There is no rebound. Musculoskeletal:         General: No tenderness. Normal range of motion. Cervical back: Normal range of motion and neck supple. Lymphadenopathy:      Cervical: No cervical adenopathy. Skin:     General: Skin is warm. Neurological:      Mental Status: She is alert and oriented to person, place, and time. Motor: No abnormal muscle tone. Psychiatric:         Behavior: Behavior normal.       ekg sinus rhythm with t wave inversion in anterior and inferior leads. [facility]:  CONCLUSION:  1. Normal perfusion scan. 2. Normal wall motion and ejection fraction. 02/20/19   ECHO ADULT COMPLETE 02/20/2019 2/20/2019    Narrative · Estimated left ventricular ejection fraction is 61 - 65%. Left   ventricular mild concentric hypertrophy. Normal left ventricular wall   motion, no regional wall motion abnormality noted. Mild (grade 1) left   ventricular diastolic dysfunction. · Mild mitral annular calcification. Trace mitral valve regurgitation. · Mild aortic valve sclerosis. Mild aortic valve regurgitation is present. · Trace tricuspid valve regurgitation. Pulmonary arterial systolic   pressure is 01.6 mmHg. There is no evidence of pulmonary hypertension. · Mild pulmonic valve regurgitation is present. Signed by: Jo-Ann Evans MD     ASSESSMENT and PLAN    ICD-10-CM ICD-9-CM    1.  Coronary artery disease of native artery of native heart with stable angina pectoris ([facility]  I25.118 414.01 NUCLEAR CARDIAC STRESS TEST 413.9 ECHO ADULT COMPLETE   2. Presence of stent in LAD coronary artery  Z95.5 V45.82    3. Essential hypertension  I10 401.9    4. Hyperlipidemia, unspecified hyperlipidemia type  E78.5 272.4      Orders Placed This Encounter    ECHO ADULT COMPLETE     Standing Status:   Future     Standing Expiration Date:   12/9/2021     Order Specific Question:   Contrast Enhancement (Bubble Study, Definity, Optison) may be used if criteria listed in established evidence-based protocol has been identified. Answer:   Yes       cardiovascular risk and specific lipid/LDL goals reviewed  use of aspirin to prevent MI and TIA's discussed. Patient with history of CAD status post PCI to mid LAD seen for f/u  C/o worsening sob with exertion. No palpitations-- has h/o / PAF- on aspirin. Refused coumadin/ NOVAC. Recommend plavix 75 mg daily.   Will obtain echo and stress test.

## 2021-06-24 ENCOUNTER — OFFICE VISIT (OUTPATIENT)
Dept: PULMONOLOGY | Age: 66
End: 2021-06-24
Payer: COMMERCIAL

## 2021-06-24 VITALS
HEART RATE: 63 BPM | DIASTOLIC BLOOD PRESSURE: 82 MMHG | HEIGHT: 65 IN | TEMPERATURE: 97.8 F | SYSTOLIC BLOOD PRESSURE: 164 MMHG | RESPIRATION RATE: 16 BRPM | OXYGEN SATURATION: 97 % | BODY MASS INDEX: 29.32 KG/M2 | WEIGHT: 176 LBS

## 2021-06-24 DIAGNOSIS — K21.9 GASTROESOPHAGEAL REFLUX DISEASE WITHOUT ESOPHAGITIS: ICD-10-CM

## 2021-06-24 DIAGNOSIS — J45.40 MODERATE PERSISTENT ASTHMA WITHOUT COMPLICATION: Primary | ICD-10-CM

## 2021-06-24 PROCEDURE — G8754 DIAS BP LESS 90: HCPCS | Performed by: INTERNAL MEDICINE

## 2021-06-24 PROCEDURE — 1090F PRES/ABSN URINE INCON ASSESS: CPT | Performed by: INTERNAL MEDICINE

## 2021-06-24 PROCEDURE — G8536 NO DOC ELDER MAL SCRN: HCPCS | Performed by: INTERNAL MEDICINE

## 2021-06-24 PROCEDURE — G9899 SCRN MAM PERF RSLTS DOC: HCPCS | Performed by: INTERNAL MEDICINE

## 2021-06-24 PROCEDURE — G8427 DOCREV CUR MEDS BY ELIG CLIN: HCPCS | Performed by: INTERNAL MEDICINE

## 2021-06-24 PROCEDURE — G8753 SYS BP > OR = 140: HCPCS | Performed by: INTERNAL MEDICINE

## 2021-06-24 PROCEDURE — 3017F COLORECTAL CA SCREEN DOC REV: CPT | Performed by: INTERNAL MEDICINE

## 2021-06-24 PROCEDURE — G8432 DEP SCR NOT DOC, RNG: HCPCS | Performed by: INTERNAL MEDICINE

## 2021-06-24 PROCEDURE — G8400 PT W/DXA NO RESULTS DOC: HCPCS | Performed by: INTERNAL MEDICINE

## 2021-06-24 PROCEDURE — 1101F PT FALLS ASSESS-DOCD LE1/YR: CPT | Performed by: INTERNAL MEDICINE

## 2021-06-24 PROCEDURE — G8417 CALC BMI ABV UP PARAM F/U: HCPCS | Performed by: INTERNAL MEDICINE

## 2021-06-24 PROCEDURE — 99214 OFFICE O/P EST MOD 30 MIN: CPT | Performed by: INTERNAL MEDICINE

## 2021-06-24 RX ORDER — FLUTICASONE FUROATE AND VILANTEROL TRIFENATATE 100; 25 UG/1; UG/1
1 POWDER RESPIRATORY (INHALATION) DAILY
Qty: 1 INHALER | Refills: 0 | Status: SHIPPED | COMMUNITY
Start: 2021-06-24

## 2021-06-24 RX ORDER — BUDESONIDE AND FORMOTEROL FUMARATE DIHYDRATE 160; 4.5 UG/1; UG/1
2 AEROSOL RESPIRATORY (INHALATION) 2 TIMES DAILY
Qty: 1 INHALER | Refills: 3 | Status: SHIPPED | OUTPATIENT
Start: 2021-06-24 | End: 2022-06-03

## 2021-06-24 RX ORDER — AZITHROMYCIN 250 MG/1
250 TABLET, FILM COATED ORAL DAILY
Qty: 6 TABLET | Refills: 0 | Status: SHIPPED | OUTPATIENT
Start: 2021-06-24 | End: 2021-06-29

## 2021-06-24 RX ORDER — PREDNISONE 10 MG/1
TABLET ORAL
Qty: 18 TABLET | Refills: 0 | Status: SHIPPED | OUTPATIENT
Start: 2021-06-24 | End: 2022-06-03

## 2021-06-24 NOTE — PROGRESS NOTES
[facility]  Pulmonary, Critical Care, and Sleep Medicine      Pulmonary Office follow up    Name: Aziza Dunaway     : 1955     Date: 2021        Subjective:   Patient has been referred for evaluation of: Persistent cough. Current problems -wheezing and asthma exacerbation    21     Patient has not been feeling well for the past month or so with increased symptoms of persistent cough, wheezing and shortness of breath. She states that she has started with increase in symptoms and about a week ago also developed sore throat with cough after she was exposed to her son who had similar symptoms. Since then she has been coughing up productive mucus -clear and not getting any relief from her current treatment which includes Singulair and albuterol as needed  At her last visit which was more than 2 years back patient had been using Breo which was helping. Denies any fever any chills  Denies any other systemic symptoms. She has a nebulizer at home-which is several years old and wishes to have a new nebulizer. In the interval since she was last seen she had 2 surgeries-bunion related with complications. She has been mindful of her diet and has lost 26 pounds      HPI:  Patient is a 78 y.o. female who is a non-smoker and has previously been diagnosed with asthma by Dr. Nenita Chery. She had been on Advair but has not used it in several years. She continued to use as needed albuterol infrequently. Around 2018 patient started with symptoms of cough which was persistent to the point that it was causing gagging and choking. For most part the cough was dry and intermittently productive of mucus. She eventually went to the urgent care center where she was diagnosed with a left-sided pneumonia and possible pleural effusion and was given Rocephin and discharged on Levaquin.   She continued to have symptoms of cough despite interventions and in December needed a course of Zithromax and prednisone after which the symptoms seem to subside for a short period of time and then started back again. Currently she mainly describes cough that is persistent and rather bothersome and comes in bouts she admits to some shortness of breath related with the coughing    This does not have any postnasal drip. Denies fever denies chills denies any night sweats. She has been taking cough suppressants ranging from [employer] to Tussionex with partial relief. Currently she is using nebulized albuterol and inhaled albuterol as needed    She has associated wheezing,   denies  chest pain, fever, chills, night sweats   Admits to having dyspepsia, reflux. 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 Previous owner had dogs  They do not have any pets at the present time  No other change in environment. Past Medical History:   Diagnosis Date    Asthma     Under control. last attack appxox. 12/2019    CAD (coronary artery disease) 1998    \"stent in LAD\"    Cancer ([facility]     squamous cell skin cancer on neck removed approx 2015    First degree AV block     Gastroesophageal reflux disease without esophagitis 6/3/2019    GERD (gastroesophageal reflux disease)     HTN (hypertension)     Hx vulvar dysplasia     Removed 08/2018    Hyperlipidemia     Hypothyroidism     no meds.  under control    IC (interstitial cystitis)     Nausea & vomiting     Nausea    Pleural effusion 01/2019    Pneumonia 01/2019     Past Surgical History:   Procedure Laterality Date    HX APPENDECTOMY  1985    HX CHOLECYSTECTOMY  1982    HX CORONARY STENT PLACEMENT  2007    HX CORONARY STENT PLACEMENT      HX HYSTERECTOMY  2015 Complete    HX LITHOTRIPSY  03/2021    HX TUBAL LIGATION Bilateral 1981    [place] CARDIAC SURG PROCEDURE UNLIST  2016 & 2018     Allergies   Allergen Reactions    Latex, Natural Rubber Rash    Adhesive Rash    Bactrim [Sulfamethoprim] Hives    Macrobid [Nitrofurantoin Monohyd/M-Cryst] Nausea and Vomiting    Nsaids (Non-Steroidal Anti-Inflammatory Drug) Other (comments)     Can not take secondary to current plavix and asa use. Current Outpatient Medications   Medication Sig Dispense Refill    dicyclomine (BENTYL) 10 mg capsule Take 10 mg by mouth as needed.  atorvastatin (LIPITOR) 80 mg tablet Take 1 Tab by mouth nightly. 90 Tab 2    metoprolol succinate (TOPROL-XL) 50 mg XL tablet TAKE 1 TABLET BY MOUTH EVERY DAY 90 Tab 3    montelukast (SINGULAIR) 10 mg tablet Take 1 Tab by mouth nightly. (Patient taking differently: Take 10 mg by mouth nightly. As needed) 90 Tab 1    amLODIPine (NORVASC) 5 mg tablet Take 1 Tab by mouth daily. 90 Tab 1    aspirin delayed-release 81 mg tablet Take 1 Tab by mouth daily. (Patient taking differently: Take 81 mg by mouth daily. Stopped Sunday 09/13/20 for surgery.) 90 Tab 3    clopidogreL (PLAVIX) 75 mg tab Take 1 Tab by mouth daily. 90 Tab 2    diazePAM (VALIUM) 10 mg tablet TAKE 1 TABLET BY MOUTH EVERY DAY AS NEEDED  4    albuterol (PROVENTIL HFA, VENTOLIN HFA, PROAIR HFA) 90 mcg/actuation inhaler Take 1 Puff by inhalation every six (6) hours as needed for Wheezing. 1 Inhaler 3    ergocalciferol (Vitamin D2) 1,250 mcg (50,000 unit) capsule Take 50,000 Units by mouth. (Patient not taking: Reported on 6/24/2021)      ascorbic acid, vitamin C, (Vitamin C) 250 mg tablet Take 250 mg by mouth. (Patient not taking: Reported on 6/8/2021)      B.infantis-B.ani-B.long-B.bifi (Probiotic 4X) 10-15 mg TbEC Take 1 Cap by mouth daily.  (Patient not taking: Reported on 6/8/2021)      promethazine (PHENERGAN) 25 mg tablet Take 1 Tab by mouth every six (6) hours as needed for Nausea. (Patient not taking: Reported on 6/8/2021) 30 Tab 0    polyethylene glycol (Miralax) 17 gram packet Take 1 Packet by mouth daily. (Patient not taking: Reported on 6/8/2021) 10 Packet 1    ciclopirox (Penlac) 8 % solution Apply 1 Each to affected area nightly. (Patient not taking: Reported on 6/8/2021) 2 Bottle 0    fluticasone furoate-vilanteroL (Breo Ellipta) 200-25 mcg/dose inhaler Take 1 Puff by inhalation daily. (Patient not taking: Reported on 6/8/2021) 3 Inhaler 1    fenofibrate nanocrystallized (TRICOR) 48 mg tablet Take 1 Tab by mouth daily. (Patient not taking: Reported on 6/8/2021) 90 Tab 2    losartan (COZAAR) 25 mg tablet Take 1 Tab by mouth daily. (Patient not taking: Reported on 6/8/2021) 90 Tab 2    nitroglycerin (NITROSTAT) 0.4 mg SL tablet 1 Tab by SubLINGual route every five (5) minutes as needed for Chest Pain. (Patient not taking: Reported on 6/24/2021) 25 Tab 1    icosapent ethyL (VASCEPA) 1 gram capsule Take 1 Cap by mouth two (2) times daily (with meals). (Patient not taking: Reported on 6/8/2021) 180 Cap 3    zolpidem (AMBIEN) 10 mg tablet Take 10 mg by mouth nightly as needed for Sleep. (Patient not taking: Reported on 6/24/2021)      ondansetron (ZOFRAN ODT) 4 mg disintegrating tablet Take 1 Tab by mouth every eight (8) hours as needed for Nausea. (Patient not taking: Reported on 6/24/2021) 14 Tab 0    phenazopyridine (PYRIDIUM) 100 mg tablet Take 100 mg by mouth as needed.  As needed (Patient not taking: Reported on 6/24/2021)       Review of Systems:  HEENT: No epistaxis, no nasal drainage, no difficulty in swallowing, no redness in eyes  Respiratory: as above  Cardiovascular: no chest pain, no palpitations, no chronic leg edema, no syncope  Gastrointestinal: no abd pain, no vomiting, no diarrhea, no bleeding symptoms  Genitourinary: No urinary symptoms or hematuria  Integument/breast: No ulcers or rashes  Musculoskeletal:Neg  Neurological: No focal weakness, no seizures, no headaches  Behvioral/Psych: No anxiety, no depression  Constitutional: No fever, no chills, no weight loss, no night sweats     Objective:     Visit Vitals  BP (!) 164/82 (BP 1 Location: Left upper arm, BP Patient Position: Sitting, BP Cuff Size: Large adult)   Pulse 63   Temp 97.8 °F (36.6 °C) (Oral)   Resp 16   Ht 5' 5\" (1.651 m)   Wt 81.2 kg (179 lb)   SpO2 97% Comment: RA Rest   BMI 29.79 kg/m²        Physical Exam:   General: comfortable, no acute distress  HEENT: pupils reactive, sclera anicteric, EOM intact  Neck: No adenopathy or thyroid swelling, no lymphadenopathy or JVD, supple  CVS: S1S2 no murmurs  RS: Mod AE bilaterally, no tactile fremitus or egophony, no accessory muscle use  Abd: soft, non tender, no hepatosplenomegaly  Neuro: non focal, awake, alert  Extrm: no leg edema, clubbing or cyanosis  Skin: no rash    Data review:   Pertinent labs: CBC, BMP, LFT's  PFT:    Date FVC FEV1  FEV1/FVC YGU19-03 TLC RV RV/TLC VC DLCO   2/27  84  78  72  57                                              Results for orders placed or performed during the hospital encounter of 07/22/19   EKG, 12 LEAD, INITIAL   Result Value Ref Range    Ventricular Rate 58 BPM    Atrial Rate 58 BPM    P-R Interval 214 ms    QRS Duration 88 ms    Q-T Interval 430 ms    QTC Calculation (Bezet) 422 ms    Calculated P Axis 47 degrees    Calculated R Axis 46 degrees    Calculated T Axis 60 degrees    Diagnosis       Sinus bradycardia with 1st degree AV block  Otherwise normal ECG  Confirmed by [facility] MD, Mark Mackey ([id number]) on 7/22/2019 8:48:03 PM         Imaging:  I have personally reviewed the patients radiographs and have reviewed the reports:  Chest x-ray done in clinic today was reviewed by myself and does not show any acute abnormalities.   Specifically there is no pleural effusion  XR Results (most recent):  Results from Hospital Encounter encounter on 09/18/20    XR FOOT LT MIN 3 V    Narrative  EXAM: Intraoperative fluoroscopy    INDICATION:  LEFT LAPIDUS PROCEDURE/AKIN OSTEOTOMY/SECOND, THIRD AND FOURTH  HAMMERTOE PIP RESECTION ARTHROPLASTY Intraoperative fluoroscopy    TECHNIQUE: Intraoperative fluoroscopic x-rays for Lapidus procedure and  hammertoe correction. COMPARISON: MRI of the left forefoot dated 6/15/2016    FLUOROSCOPY TIME: 20 seconds    FLUOROSCOPY IMAGES: 6    FINDINGS:  Osteotomy of the first proximal phalanx is noted with staples  traversing the osteotomy. Fusion hardware of the first TMT joint are noted with  2 side plates, screws and a perpendicular screw. These are new since prior  imaging. Mild operative changes are noted in the first metatarsal head. Fusion  hardware is noted at the proximal interphalangeal joints of the second through  fourth toes. No complications identified within the visualized views. For  further information please review the provider's notes. Impression  IMPRESSION:  1. Operative changes in the 1st ray as well as the 2nd through 4th PIP joints  without evidence of complication. CT Results (most recent):  Results from Hospital Encounter encounter on 09/13/16    CT MAXILLOFACIAL WO CONT    Narrative  FACIAL CT WITHOUT CONTRAST WITH RECONSTRUCTIONS    CPT CODE: 32917    HISTORY: Trip and fall injury several hours PTA; right-sided head/facial pain. COMPARISON: No prior imaging. TECHNIQUE: Helical 2.5 mm axial images acquired through the facial region. Sagittal and coronal reformations generated. CT scans at this facility are  performed using dose optimization technique as appropriate with performed exam,  to include automated exposure control, adjustment of mA and/or kV according to  patient's size (including appropriate matching for site-specific examinations),  or use of iterative reconstruction technique. FINDINGS:  Mild right superior lateral periorbital soft tissue swelling. No foreign body. Facial bones are intact. Orbital structures are intact.     Minimal mucosal thickening inferior left maxillary sinus. The paranasal sinuses  are otherwise clear. Mandible intact. Mild to moderate degenerative changes upper cervical spine. Impression  IMPRESSION:    1. Mild right periorbital soft tissue contusions. No facial fracture. Thank you for this referral.    .     Patient Active Problem List   Diagnosis Code    Mild intermittent asthma without complication C03.34    Hypertension I10    CAD (coronary artery disease) I25.10    Hyperlipidemia E78.5    IC (interstitial cystitis) N30.10    Presence of stent in LAD coronary artery Z95.5    Acute coronary syndrome ([address] I24.9    ACS (acute coronary syndrome) ([address] I24.9    LV dysfunction I51.9    Atypical chest pain R07.89    Coronary artery disease of bypass graft of native heart with stable angina pectoris (HCC) I25.708    Gastroesophageal reflux disease without esophagitis K21.9    Bunion of great toe M21.619     IMPRESSION:   · Acute exacerbation of asthma-moderate persistent secondary to recent loss of control as well as respiratory infection. Patient needs step up therapy and then continue maintenance therapy more aggressively. · Persistent cough with characteristics very suggestive of chronic airway inflammation likely triggered by pneumonia in October 2018 with residual hyperreactive airways  · Asthma-at baseline mild intermittent currently moderate persistent  · GERD likely also playing a role in her chronic cough  · Coronary artery disease  · Hypertension  · History of interstitial cystitis      RECOMMENDATIONS:   · Will prescribe taper of prednisone with Zithromax for acute exacerbation. · Resume combination long-acting beta agonist and ICS.   Patient's insurance covers for Symbicort and therefore Symbicort prescribed 2 puffs twice daily  · Providing her a sample of Breo to start-1 time  · Continue to optimize treatment for airway inflammation  · Pulmicort via nebulizer twice daily can be reinitiated along with as needed albuterol. Orders placed for new nebulizer machine  · Continue Singulair 10 mg daily  · Adequate ambient air humidification advised   · IgE level -normal  · GERD instructions and precautions follow-up with GI. · We will follow-up in 6 weeks to 8 weeks and sooner should there be any change     Health maintenance screens deferred to Primary care provider.      Marti Gaviria MD

## 2021-06-24 NOTE — LETTER
6/24/2021    Patient: Destinee Zuleta   YOB: 1955   Date of Visit: 6/24/2021     Nithin Pastor MD  [place] [id number]  [place] [address]  Via Fax: [phone]    Dear Nithin Pastor MD,      Thank you for referring Ms. Destinee Zuleta to [address] [place] [address] for evaluation. My notes for this consultation are attached. If you have questions, please do not hesitate to call me. I look forward to following your patient along with you.       Sincerely,    Lexi Carbajal MD

## 2021-06-24 NOTE — PROGRESS NOTES
Delisa Zamudio presents today for   Chief Complaint   Patient presents with    Cough with sputum       Is someone accompanying this pt? No    Is the patient using any DME equipment during OV? No    -DME Company NA    Depression Screening:  3 most recent PHQ Screens 12/15/2020   Little interest or pleasure in doing things Not at all   Feeling down, depressed, irritable, or hopeless Not at all   Total Score PHQ 2 0       Learning Assessment:  Learning Assessment 8/17/2016   PRIMARY LEARNER Patient   CO-LEARNER CAREGIVER -   PRIMARY LANGUAGE ENGLISH   LEARNER PREFERENCE PRIMARY LISTENING     -   ANSWERED BY patient   RELATIONSHIP SELF   ASSESSMENT COMMENT -       Abuse Screening:  No flowsheet data found. Fall Risk  Fall Risk Assessment, last 12 mths 2/3/2021   Able to walk? Yes   Fall in past 12 months? 0   Do you feel unsteady? 0   Are you worried about falling 0   Number of falls in past 12 months -   Fall with injury? -         Coordination of Care:  1. Have you been to the ER, urgent care clinic since your last visit? Hospitalized since your last visit? No    2. Have you seen or consulted any other health care providers outside of the [address] [place] [address] since your last visit? Include any pap smears or colon screening.  No

## 2022-02-17 RX ORDER — ATORVASTATIN CALCIUM 80 MG/1
TABLET, FILM COATED ORAL
Qty: 90 TABLET | Refills: 2 | Status: SHIPPED | OUTPATIENT
Start: 2022-02-17

## 2022-02-19 LAB
ALBUMIN SERPL-MCNC: 4.3 G/DL (ref 3.8–4.8)
ALP SERPL-CCNC: 80 IU/L (ref 44–121)
ALT SERPL-CCNC: 13 IU/L (ref 0–32)
AST SERPL-CCNC: 19 IU/L (ref 0–40)
BILIRUB DIRECT SERPL-MCNC: 0.13 MG/DL (ref 0–0.4)
BILIRUB SERPL-MCNC: 0.5 MG/DL (ref 0–1.2)
CHOLEST SERPL-MCNC: 347 MG/DL (ref 100–199)
HDLC SERPL-MCNC: 51 MG/DL
LDLC SERPL CALC-MCNC: 217 MG/DL (ref 0–99)
PROT SERPL-MCNC: 6.1 G/DL (ref 6–8.5)
SPECIMEN STATUS REPORT, ROLRST: NORMAL
TRIGL SERPL-MCNC: 375 MG/DL (ref 0–149)
VLDLC SERPL CALC-MCNC: 79 MG/DL (ref 5–40)

## 2022-02-28 ENCOUNTER — TELEPHONE (OUTPATIENT)
Dept: CARDIOLOGY CLINIC | Age: 67
End: 2022-02-28

## 2022-02-28 DIAGNOSIS — Z95.5 PRESENCE OF STENT IN LAD CORONARY ARTERY: Primary | ICD-10-CM

## 2022-02-28 DIAGNOSIS — E78.5 HYPERLIPIDEMIA, UNSPECIFIED HYPERLIPIDEMIA TYPE: ICD-10-CM

## 2022-02-28 NOTE — TELEPHONE ENCOUNTER
----- Message from Daylin Chacon MD sent at 2/28/2022  9:15 AM EST -----  Please call her and see if she is taking statin?

## 2022-03-01 NOTE — TELEPHONE ENCOUNTER
Advised patient to take Lipitor 80 mg every day and repeat Lipids x 1 m. Patient states understanding, lab order mailed.

## 2022-03-01 NOTE — TELEPHONE ENCOUNTER
LDL very high--- risk for recurrent restenosis-- needs to take statin daily.   Recheck lipid panel in 1 month

## 2022-03-18 PROBLEM — R07.89 ATYPICAL CHEST PAIN: Status: ACTIVE | Noted: 2017-10-09

## 2022-03-19 PROBLEM — K21.9 GASTROESOPHAGEAL REFLUX DISEASE WITHOUT ESOPHAGITIS: Status: ACTIVE | Noted: 2019-06-03

## 2022-03-19 PROBLEM — I25.708 CORONARY ARTERY DISEASE OF BYPASS GRAFT OF NATIVE HEART WITH STABLE ANGINA PECTORIS (HCC): Status: ACTIVE | Noted: 2018-09-24

## 2022-03-20 PROBLEM — M21.619 BUNION OF GREAT TOE: Status: ACTIVE | Noted: 2020-09-18

## 2022-05-27 NOTE — TELEPHONE ENCOUNTER
Dry nose:  Nasal saline a couple times/day  vaseline to nares once or twice daily    Increase nexium (esomeprazole) to twice daily. Stop doxazosin      [employer], [place]    Diarrhea: We recommend imodium AD (available over the counter) 2 tablets at the first onset of diarrhea. Then take 1 tablet with every loose stool. Please call if you still have symptoms after this. *Alternative 1 tablet with first episode  Â   Constipation:  We recommend senokot-S (also over the counter) 1-2 tablets twice daily as needed for constipation. If you are still constipated despite 2 tablets twice daily, I recommend adding once daily miralax, which is also over the counter. Make sure you are drinking 6-8 8oz glasses of water/daily.   Â  Pt requesting samples of Breo. Please advise [phone].

## 2022-06-03 ENCOUNTER — APPOINTMENT (OUTPATIENT)
Dept: CT IMAGING | Age: 67
End: 2022-06-03
Attending: EMERGENCY MEDICINE
Payer: COMMERCIAL

## 2022-06-03 ENCOUNTER — HOSPITAL ENCOUNTER (EMERGENCY)
Age: 67
Discharge: HOME | End: 2022-06-03
Attending: EMERGENCY MEDICINE
Payer: COMMERCIAL

## 2022-06-03 VITALS
DIASTOLIC BLOOD PRESSURE: 97 MMHG | TEMPERATURE: 98.3 F | RESPIRATION RATE: 18 BRPM | WEIGHT: 179 LBS | HEIGHT: 66 IN | SYSTOLIC BLOOD PRESSURE: 138 MMHG | HEART RATE: 87 BPM | BODY MASS INDEX: 28.77 KG/M2 | OXYGEN SATURATION: 95 %

## 2022-06-03 DIAGNOSIS — R51.9 NONINTRACTABLE HEADACHE, UNSPECIFIED CHRONICITY PATTERN, UNSPECIFIED HEADACHE TYPE: Primary | ICD-10-CM

## 2022-06-03 DIAGNOSIS — J01.80 OTHER ACUTE SINUSITIS, RECURRENCE NOT SPECIFIED: ICD-10-CM

## 2022-06-03 LAB
ANION GAP SERPL CALC-SCNC: 8 MMOL/L (ref 3–18)
BASOPHILS # BLD: 0 K/UL (ref 0–0.1)
BASOPHILS NFR BLD: 0 % (ref 0–2)
BUN SERPL-MCNC: 12 MG/DL (ref 7–18)
BUN/CREAT SERPL: 11 (ref 12–20)
CALCIUM SERPL-MCNC: 9 MG/DL (ref 8.5–10.1)
CHLORIDE SERPL-SCNC: 105 MMOL/L (ref 100–111)
CO2 SERPL-SCNC: 28 MMOL/L (ref 21–32)
CREAT SERPL-MCNC: 1.07 MG/DL (ref 0.6–1.3)
DIFFERENTIAL METHOD BLD: NORMAL
EOSINOPHIL # BLD: 0.2 K/UL (ref 0–0.4)
EOSINOPHIL NFR BLD: 3 % (ref 0–5)
ERYTHROCYTE [DISTWIDTH] IN BLOOD BY AUTOMATED COUNT: 11.8 % (ref 11.6–14.5)
GLUCOSE SERPL-MCNC: 126 MG/DL (ref 74–99)
HCT VFR BLD AUTO: 42.2 % (ref 35–45)
HGB BLD-MCNC: 14.8 G/DL (ref 12–16)
IMM GRANULOCYTES # BLD AUTO: 0 K/UL (ref 0–0.04)
IMM GRANULOCYTES NFR BLD AUTO: 0 % (ref 0–0.5)
LYMPHOCYTES # BLD: 2.4 K/UL (ref 0.9–3.6)
LYMPHOCYTES NFR BLD: 33 % (ref 21–52)
MAGNESIUM SERPL-MCNC: 2.1 MG/DL (ref 1.6–2.6)
MCH RBC QN AUTO: 31.9 PG (ref 24–34)
MCHC RBC AUTO-ENTMCNC: 35.1 G/DL (ref 31–37)
MCV RBC AUTO: 90.9 FL (ref 78–100)
MONOCYTES # BLD: 0.7 K/UL (ref 0.05–1.2)
MONOCYTES NFR BLD: 9 % (ref 3–10)
NEUTS SEG # BLD: 4 K/UL (ref 1.8–8)
NEUTS SEG NFR BLD: 55 % (ref 40–73)
NRBC # BLD: 0 K/UL (ref 0–0.01)
NRBC BLD-RTO: 0 PER 100 WBC
PLATELET # BLD AUTO: 267 K/UL (ref 135–420)
PMV BLD AUTO: 10.9 FL (ref 9.2–11.8)
POTASSIUM SERPL-SCNC: 3.7 MMOL/L (ref 3.5–5.5)
RBC # BLD AUTO: 4.64 M/UL (ref 4.2–5.3)
SODIUM SERPL-SCNC: 141 MMOL/L (ref 136–145)
WBC # BLD AUTO: 7.2 K/UL (ref 4.6–13.2)

## 2022-06-03 PROCEDURE — 80048 BASIC METABOLIC PNL TOTAL CA: CPT

## 2022-06-03 PROCEDURE — 96361 HYDRATE IV INFUSION ADD-ON: CPT

## 2022-06-03 PROCEDURE — 83735 ASSAY OF MAGNESIUM: CPT

## 2022-06-03 PROCEDURE — 85025 COMPLETE CBC W/AUTO DIFF WBC: CPT

## 2022-06-03 PROCEDURE — 99284 EMERGENCY DEPT VISIT MOD MDM: CPT

## 2022-06-03 PROCEDURE — 96374 THER/PROPH/DIAG INJ IV PUSH: CPT

## 2022-06-03 PROCEDURE — 70450 CT HEAD/BRAIN W/O DYE: CPT

## 2022-06-03 PROCEDURE — 96375 TX/PRO/DX INJ NEW DRUG ADDON: CPT

## 2022-06-03 PROCEDURE — 74011250636 HC RX REV CODE- 250/636: Performed by: EMERGENCY MEDICINE

## 2022-06-03 PROCEDURE — 74011250637 HC RX REV CODE- 250/637: Performed by: EMERGENCY MEDICINE

## 2022-06-03 RX ORDER — ACETAMINOPHEN 325 MG/1
650 TABLET ORAL
Qty: 30 TABLET | Refills: 0 | Status: SHIPPED | OUTPATIENT
Start: 2022-06-03

## 2022-06-03 RX ORDER — PROCHLORPERAZINE EDISYLATE 5 MG/ML
10 INJECTION INTRAMUSCULAR; INTRAVENOUS
Status: COMPLETED | OUTPATIENT
Start: 2022-06-03 | End: 2022-06-03

## 2022-06-03 RX ORDER — ACETAMINOPHEN 500 MG
1000 TABLET ORAL ONCE
Status: COMPLETED | OUTPATIENT
Start: 2022-06-03 | End: 2022-06-03

## 2022-06-03 RX ORDER — DEXAMETHASONE SODIUM PHOSPHATE 4 MG/ML
4 INJECTION, SOLUTION INTRA-ARTICULAR; INTRALESIONAL; INTRAMUSCULAR; INTRAVENOUS; SOFT TISSUE ONCE
Status: COMPLETED | OUTPATIENT
Start: 2022-06-03 | End: 2022-06-03

## 2022-06-03 RX ORDER — AMOXICILLIN 500 MG/1
500 TABLET, FILM COATED ORAL 3 TIMES DAILY
Qty: 21 TABLET | Refills: 0 | Status: SHIPPED | OUTPATIENT
Start: 2022-06-03 | End: 2022-06-10

## 2022-06-03 RX ORDER — VITAMIN A 3000 MCG
2 CAPSULE ORAL
Qty: 15 ML | Refills: 0 | Status: SHIPPED | OUTPATIENT
Start: 2022-06-03

## 2022-06-03 RX ORDER — DIPHENHYDRAMINE HYDROCHLORIDE 50 MG/ML
25 INJECTION, SOLUTION INTRAMUSCULAR; INTRAVENOUS
Status: COMPLETED | OUTPATIENT
Start: 2022-06-03 | End: 2022-06-03

## 2022-06-03 RX ADMIN — DIPHENHYDRAMINE HYDROCHLORIDE 25 MG: 50 INJECTION, SOLUTION INTRAMUSCULAR; INTRAVENOUS at 14:36

## 2022-06-03 RX ADMIN — SODIUM CHLORIDE 1000 ML: 900 INJECTION, SOLUTION INTRAVENOUS at 13:58

## 2022-06-03 RX ADMIN — PROCHLORPERAZINE EDISYLATE 10 MG: 5 INJECTION INTRAMUSCULAR; INTRAVENOUS at 14:35

## 2022-06-03 RX ADMIN — ACETAMINOPHEN 1000 MG: 500 TABLET ORAL at 13:36

## 2022-06-03 RX ADMIN — DEXAMETHASONE SODIUM PHOSPHATE 4 MG: 4 INJECTION, SOLUTION INTRAMUSCULAR; INTRAVENOUS at 14:27

## 2022-06-03 NOTE — ED PROVIDER NOTES
EMERGENCY DEPARTMENT HISTORY AND PHYSICAL EXAM    1:28 PM      Date: 6/3/2022  Patient Name: Mis Case    History of Presenting Illness     Chief Complaint   Patient presents with    Headache         History Provided By: Patient    Additional History (Context): Mis Figueroa is a 78 y.o. female with Past medical history of asthma, CAD, GERD, hypertension, hyperlipidemia who presents with chief complaint of intermittent headache for the past 2 weeks. She reports that there is no specific location as it changes in location, but is achy and throbbing. She recalls having a migraine headache only once about 10 years ago. Tried some NSAIDs with no relief, so she came to the ER for evaluation. She denies any trauma, and no dizziness, visual changes, numbness, weakness, fever, nausea, vomiting, and no chest pain, shortness of breath, neck pain, facial pain, and no other complaints. PCP: None        Past History     Past Medical History:  Past Medical History:   Diagnosis Date    Asthma     Under control. last attack appxox. 12/2019    CAD (coronary artery disease) 1998    \"stent in LAD\"    Cancer ([facility] [address]     squamous cell skin cancer on neck removed approx 2015    First degree AV block     Gastroesophageal reflux disease without esophagitis 6/3/2019    GERD (gastroesophageal reflux disease)     HTN (hypertension)     Hx vulvar dysplasia     Removed 08/2018    Hyperlipidemia     Hypothyroidism     no meds.  under control    IC (interstitial cystitis)     Nausea & vomiting     Nausea    Pleural effusion 01/2019    Pneumonia 01/2019       Past Surgical History:  Past Surgical History:   Procedure Laterality Date    HX APPENDECTOMY  1985    HX CHOLECYSTECTOMY  1982    HX CORONARY STENT PLACEMENT  2007    HX CORONARY STENT PLACEMENT      HX HYSTERECTOMY  2015    Complete    HX LITHOTRIPSY  03/2021    HX TUBAL LIGATION Bilateral 1981    AL CARDIAC SURG PROCEDURE UNLIST  2016 & 2018       Family History:  Family History   Problem Relation Age of Onset    Cancer Mother     Heart Disease Father     Hypertension Father     Elevated Lipids Father     Diabetes Neg Hx     Stroke Neg Hx        Social History:  Social History     Tobacco Use    Smoking status: Never Smoker    Smokeless tobacco: Never Used   Substance Use Topics    Alcohol use: Not Currently     Comment: last use 06/2020    Drug use: Never       Allergies: Allergies   Allergen Reactions    Latex, Natural Rubber Rash    Adhesive Rash    Bactrim [Sulfamethoprim] Hives    Macrobid [Nitrofurantoin Monohyd/M-Cryst] Nausea and Vomiting    Nsaids (Non-Steroidal Anti-Inflammatory Drug) Other (comments)     Can not take secondary to current plavix and asa use. Review of Systems       Review of Systems   Constitutional: Negative for chills and fever. HENT: Negative for congestion, rhinorrhea, sore throat and trouble swallowing. Eyes: Negative for visual disturbance. Respiratory: Negative for cough, shortness of breath and wheezing. Cardiovascular: Negative for chest pain. Gastrointestinal: Negative for abdominal pain, nausea and vomiting. Endocrine: Negative for polyuria. Genitourinary: Negative for dysuria. Musculoskeletal: Negative for arthralgias and neck stiffness. Skin: Negative for pallor and rash. Neurological: Negative for dizziness, weakness, numbness and headaches. Hematological: Does not bruise/bleed easily. Psychiatric/Behavioral: Negative for confusion and dysphoric mood. All other systems reviewed and are negative. Physical Exam     Visit Vitals  BP (!) 138/97 (BP 1 Location: Left upper arm, BP Patient Position: At rest)   Pulse 87   Temp 98.3 °F (36.8 °C)   Resp 18   Ht 5' 6\" (1.676 m)   Wt 81.2 kg (179 lb)   SpO2 95%   BMI 28.89 kg/m²         Physical Exam  Vitals and nursing note reviewed. Constitutional:       General: She is not in acute distress.      Appearance: She is well-developed. She is not toxic-appearing or diaphoretic. HENT:      Head: Normocephalic and atraumatic. Right Ear: External ear normal.      Left Ear: External ear normal.      Nose: Nose normal.      Mouth/Throat:      Mouth: Mucous membranes are moist.      Pharynx: No oropharyngeal exudate or posterior oropharyngeal erythema. Eyes:      General: No scleral icterus. Extraocular Movements: Extraocular movements intact. Conjunctiva/sclera: Conjunctivae normal.      Pupils: Pupils are equal, round, and reactive to light. Cardiovascular:      Rate and Rhythm: Normal rate. Pulses: Normal pulses. Comments: Capillary refill < 3 seconds  Pulmonary:      Effort: Pulmonary effort is normal. No respiratory distress. Breath sounds: Normal breath sounds. No wheezing. Abdominal:      General: Bowel sounds are normal. There is no distension. Palpations: Abdomen is soft. Tenderness: There is no abdominal tenderness. Musculoskeletal:         General: No tenderness. Normal range of motion. Cervical back: Normal range of motion and neck supple. No tenderness. Lymphadenopathy:      Cervical: No cervical adenopathy. Skin:     General: Skin is warm and dry. Coloration: Skin is not jaundiced or pale. Neurological:      General: No focal deficit present. Mental Status: She is alert and oriented to person, place, and time. Cranial Nerves: No cranial nerve deficit. Sensory: No sensory deficit. Motor: No weakness. Coordination: Coordination normal.      Comments: No cerebellar ataxia on finger-nose test  Sensation intact  Strength 5 out of 5 bilateral upper and lower extremities and symmetric throughout. No drifting  No slurred speech  No facial droop   Psychiatric:         Behavior: Behavior normal.         Thought Content:  Thought content normal.           Diagnostic Study Results     Labs -  Recent Results (from the past 12 hour(s))   CBC WITH AUTOMATED DIFF    Collection Time: 06/03/22  1:32 PM   Result Value Ref Range    WBC 7.2 4.6 - 13.2 K/uL    RBC 4.64 4.20 - 5.30 M/uL    HGB 14.8 12.0 - 16.0 g/dL    HCT 42.2 35.0 - 45.0 %    MCV 90.9 78.0 - 100.0 FL    MCH 31.9 24.0 - 34.0 PG    MCHC 35.1 31.0 - 37.0 g/dL    RDW 11.8 11.6 - 14.5 %    PLATELET 006 763 - 587 K/uL    MPV 10.9 9.2 - 11.8 FL    NRBC 0.0 0  WBC    ABSOLUTE NRBC 0.00 0.00 - 0.01 K/uL    NEUTROPHILS 55 40 - 73 %    LYMPHOCYTES 33 21 - 52 %    MONOCYTES 9 3 - 10 %    EOSINOPHILS 3 0 - 5 %    BASOPHILS 0 0 - 2 %    IMMATURE GRANULOCYTES 0 0.0 - 0.5 %    ABS. NEUTROPHILS 4.0 1.8 - 8.0 K/UL    ABS. LYMPHOCYTES 2.4 0.9 - 3.6 K/UL    ABS. MONOCYTES 0.7 0.05 - 1.2 K/UL    ABS. EOSINOPHILS 0.2 0.0 - 0.4 K/UL    ABS. BASOPHILS 0.0 0.0 - 0.1 K/UL    ABS. IMM. GRANS. 0.0 0.00 - 0.04 K/UL    DF AUTOMATED     METABOLIC PANEL, BASIC    Collection Time: 06/03/22  1:32 PM   Result Value Ref Range    Sodium 141 136 - 145 mmol/L    Potassium 3.7 3.5 - 5.5 mmol/L    Chloride 105 100 - 111 mmol/L    CO2 28 21 - 32 mmol/L    Anion gap 8 3.0 - 18 mmol/L    Glucose 126 (H) 74 - 99 mg/dL    BUN 12 7.0 - 18 MG/DL    Creatinine 1.07 0.6 - 1.3 MG/DL    BUN/Creatinine ratio 11 (L) 12 - 20      GFR est AA >60 >60 ml/min/1.73m2    GFR est non-AA 51 (L) >60 ml/min/1.73m2    Calcium 9.0 8.5 - 10.1 MG/DL   MAGNESIUM    Collection Time: 06/03/22  1:32 PM   Result Value Ref Range    Magnesium 2.1 1.6 - 2.6 mg/dL       Radiologic Studies -   CT HEAD WO CONT   Final Result      No acute findings to explain patient's symptoms. Mild sequela of chronic microvascular ischemic disease and mild generalized   volume loss. Mild sinus mucosal disease. Medical Decision Making   I am the first provider for this patient. I reviewed the vital signs, available nursing notes, past medical history, past surgical history, family history and social history. Vital Signs-Reviewed the patient's vital signs.     Pulse Oximetry Analysis -  95% on room air (Interpretation) normal    Cardiac Monitor:  Rate: 87  Rhythm:  Normal Sinus Rhythm         Records Reviewed: Nursing Notes and Old Medical Records (Time of Review: 1:28 PM)    Provider Notes (Medical Decision Making):  NORBERT     DDx: Migraine, cluster headache, tension headache, ICH, brain mass, metabolic    Doubt SAH    We will give IV fluid, Benadryl, Tylenol, Solu-Medrol we will get CT scan brain    Medications   sodium chloride 0.9 % bolus infusion 1,000 mL (0 mL IntraVENous IV Completed 6/3/22 1440)   acetaminophen (TYLENOL) tablet 1,000 mg (1,000 mg Oral Given 6/3/22 1336)   diphenhydrAMINE (BENADRYL) injection 25 mg (25 mg IntraVENous Given 6/3/22 1436)   prochlorperazine (COMPAZINE) injection 10 mg (10 mg IntraVENous Given 6/3/22 1435)   dexamethasone (DECADRON) 4 mg/mL injection 4 mg (4 mg IntraVENous Given 6/3/22 1427)           ED Course: Progress Notes, Reevaluation, and Consults:  No alarming labs    Has some sinus mucosal disease on CT scan, otherwise no acute process on CT scan brain    I have reassessed the patient. I have discussed the workup, results and plan with the patient and patient is in agreement. Patient is feeling better. Patient will be prescribed amoxicillin, saline nasal spray, Tylenol. Patient was discharge in stable condition. Patient was given outpatient follow up. Patient is to return to emergency department if any new or worsening condition. Diagnosis     Clinical Impression:   1. Nonintractable headache, unspecified chronicity pattern, unspecified headache type    2.  Other acute sinusitis, recurrence not specified        Disposition: Discharged    Follow-up Information     Follow up With Specialties Details Why Contact Info    [address]  Schedule an appointment as soon as possible for a visit in 3 days  [address] [facility]    [address] [facility] EMERGENCY DEPT Emergency Medicine  As needed, If symptoms worsen Evelyn Correa [id number] [phone]           Patient's Medications   Start Taking    ACETAMINOPHEN (TYLENOL) 325 MG TABLET    Take 2 Tablets by mouth every six (6) hours as needed for Pain or Fever. AMOXICILLIN 500 MG TAB    Take 500 mg by mouth three (3) times daily for 7 days. Indications: acute bacterial infection of the sinuses    SODIUM CHLORIDE (SALINE NASAL) 0.65 % NASAL SQUEEZE BOTTLE    0.1 mL by Both Nostrils route every three (3) hours as needed for Congestion. Continue Taking    ALBUTEROL (PROVENTIL HFA, VENTOLIN HFA, PROAIR HFA) 90 MCG/ACTUATION INHALER    Take 1 Puff by inhalation every six (6) hours as needed for Wheezing. AMLODIPINE (NORVASC) 5 MG TABLET    Take 1 Tab by mouth daily. ASCORBIC ACID, VITAMIN C, (VITAMIN C) 250 MG TABLET    Take 250 mg by mouth. ASPIRIN DELAYED-RELEASE 81 MG TABLET    Take 1 Tab by mouth daily. ATORVASTATIN (LIPITOR) 80 MG TABLET    TAKE 1 TABLET BY MOUTH EVERY NIGHT    B. INFANTIS-B. ANI-B. LONG-B.BIFI (PROBIOTIC 4X) 10-15 MG TBEC    Take 1 Capsule by mouth daily. DIAZEPAM (VALIUM) 10 MG TABLET    TAKE 1 TABLET BY MOUTH EVERY DAY AS NEEDED    ERGOCALCIFEROL (VITAMIN D2) 1,250 MCG (50,000 UNIT) CAPSULE    Take 50,000 Units by mouth. FLUTICASONE FUROATE-VILANTEROL (BREO ELLIPTA) 100-25 MCG/DOSE INHALER    Take 1 Puff by inhalation daily. METOPROLOL SUCCINATE (TOPROL-XL) 50 MG XL TABLET    TAKE 1 TABLET BY MOUTH EVERY DAY    MONTELUKAST (SINGULAIR) 10 MG TABLET    Take 1 Tab by mouth nightly. NITROGLYCERIN (NITROSTAT) 0.4 MG SL TABLET    1 Tab by SubLINGual route every five (5) minutes as needed for Chest Pain. ONDANSETRON (ZOFRAN ODT) 4 MG DISINTEGRATING TABLET    Take 1 Tab by mouth every eight (8) hours as needed for Nausea. PHENAZOPYRIDINE (PYRIDIUM) 100 MG TABLET    Take 100 mg by mouth as needed.  As needed    POLYETHYLENE GLYCOL (MIRALAX) 17 GRAM PACKET    Take 1 Packet by mouth daily. ZOLPIDEM (AMBIEN) 10 MG TABLET    Take 10 mg by mouth nightly as needed for Sleep. These Medications have changed    No medications on file   Stop Taking    BUDESONIDE-FORMOTEROL (SYMBICORT) 160-4.5 MCG/ACTUATION HFAA    Take 2 Puffs by inhalation two (2) times a day. CICLOPIROX (PENLAC) 8 % SOLUTION    Apply 1 Each to affected area nightly. CLOPIDOGREL (PLAVIX) 75 MG TAB    Take 1 Tab by mouth daily. DICYCLOMINE (BENTYL) 10 MG CAPSULE    Take 10 mg by mouth as needed. FENOFIBRATE NANOCRYSTALLIZED (TRICOR) 48 MG TABLET    Take 1 Tab by mouth daily. ICOSAPENT ETHYL (VASCEPA) 1 GRAM CAPSULE    Take 1 Cap by mouth two (2) times daily (with meals). LOSARTAN (COZAAR) 25 MG TABLET    Take 1 Tab by mouth daily. PREDNISONE (DELTASONE) 10 MG TABLET    30 mg po dailyx 3 days 20 mg po daily x 3 days 10 mg po daily x3 days    PROMETHAZINE (PHENERGAN) 25 MG TABLET    Take 1 Tab by mouth every six (6) hours as needed for Nausea. Angeline Rico DO    [employer] dictation software was used for portions of this report. Unintended transcription errors may occur. My signature above authenticates this document and my orders, the final    diagnosis (es), discharge prescription (s), and instructions in the Epic    record.

## 2022-06-03 NOTE — ED TRIAGE NOTES
Patient c/o \"terrible headache\" that began approximately two weeks ago. She states headache never resolves, but worsens at times. Advises that location of headache pain changes. Denies any injury to her head, dizziness, vision changes or paresthesias. She states having eye surgery in February. Patient states distant hx of migraine.

## 2022-07-06 ENCOUNTER — TRANSCRIBE ORDER (OUTPATIENT)
Dept: SCHEDULING | Age: 67
End: 2022-07-06

## 2022-07-06 DIAGNOSIS — Z12.31 VISIT FOR SCREENING MAMMOGRAM: Primary | ICD-10-CM

## 2022-07-13 ENCOUNTER — HOSPITAL ENCOUNTER (OUTPATIENT)
Dept: MAMMOGRAPHY | Age: 67
Discharge: HOME | End: 2022-07-13
Attending: EMERGENCY MEDICINE
Payer: COMMERCIAL

## 2022-07-13 DIAGNOSIS — Z12.31 VISIT FOR SCREENING MAMMOGRAM: ICD-10-CM

## 2022-07-13 PROCEDURE — 77067 SCR MAMMO BI INCL CAD: CPT

## 2022-07-13 PROCEDURE — 77063 BREAST TOMOSYNTHESIS BI: CPT

## 2023-02-22 NOTE — OP NOTES
PT Name: Yesika Degroot  MR #: [id number]     DOCUMENTATION CLARIFICATION     CDS/: RYDER Silva,RNC-MNN         Contact information:[email or web address]  This form is a permanent document in the medical record.    Query Date: February 22, 2023  By submitting this query, we are merely seeking further clarification of documentation. Please utilize your independent clinical judgment when addressing the question(s) below.      Indicators Supporting Clinical Findings Location in Medical Record   X Documentation of uterine atony with bleeding, post-partum bleeding, or hemorrhage noting uterine atony with bleeding L&D Delivery note 2/16    Documentation of retained placenta     X Delivery type with EBL or QBL Normal spontaneous vaginal delivery of live male infant    EBL: 600  L&D Delivery note 2/16   X H/H Hgb=11.1-->10.3-->9.7-->9.5-->9.3  Hct=35.4-->32.2-->31.6-->29.7 LAB 2/15-2/18    Vital signs     X Medications, Treatment IV pitocin given noting uterine atony with bleeding, bladder was emptied, fundal message was done, methergine x1 was given and bleeding slowed. L&D Delivery note 2/16    Blood transfusion      Other          Provider, please specify the diagnosis or diagnoses associated with the above clinical findings:      [   ] Immediate post-partum hemorrhage   [ x  ] Postpartum uterine atony without hemorrhage   [   ] Other hematological diagnosis (please specify): _________________   [  ] Clinically undetermined        Please document in your progress notes daily for the duration of treatment, until resolved, and include in your discharge summary.  (Form 39875)       Patient: Frederick Fields                MRN:@           SSN: [id number]   YOB: 1955         AGE: 76 y.o. SEX: female       OPERATIVE REPORT    Patient: Frederick Fields  YOB: 1955  MRN: [id number]    Date of Procedure: 9/18/2020     Pre-Op Diagnosis: M21.619 BUNION OF GREAT TOE, severe bunion deformity, left foot, rigid hammertoes, left #2 3 toes, semirigid left #4 toe    Post-Op Diagnosis: Same as preoperative diagnosis. Procedure(s):  LEFT LAPIDUS PROCEDURE/AKIN OSTEOTOMY/SECOND, THIRD AND FOURTH HAMMERTOE PIP RESECTION ARTHROPLASTY/C-ARM/LAPIPLASTY TRACE II SYSTEM, BME HAMMERLOCK IMPLANT, CANNULATED SCREWS/NERVE BLOCK    Surgeon(s):  Luis Jordan MD    Surgical Assistant: Physician Assistant: SHERIF Braun PA:  ASSISTANT    Cy Steiner MPA, MA, PAJAE, PhD was medically necessary for the procedure. She assisted in : patient positioning, surgical incision retraction, placement of hardware, incision closure, placement of DME, and transfer of the patient to the PACU. Anesthesia: General and Regional Block    Estimated Blood Loss (mL): less than 50 ml      Tourniquet Time:  120 minutes at  300  Mm HG m then 10 minutes deflation, then 96 minutes 2nd tourniquet time  Complications: None    Specimens: * No specimens in log *     Implants:   Implant Name Type Inv.  Item Serial No.  Lot No. LRB No. Used Action   SCREW BNE LNG LAPIPLASTY SYS 2 - [id number]  SCREW BNE LNG LAPIPLASTY SYS 2  [employer]_ [id number] Left 2 Implanted   SCREW BNE BIPLANAR RENÉE FOR LAPIPLASTY SYS 2 - [id number]  SCREW BNE BIPLANAR RENÉE FOR LAPIPLASTY SYS 2  [employer]_ [id number] Left 1 Implanted   SCREW BNE NANNETTE 3X32 MM HDLSS NS EPIC - [id number]  SCREW BNE NANNETTE 3X32 MM HDLSS NS EPIC  [employer]WD N/A Left 1 Implanted   STAPLE BNE FIX [id number] WIRE 1.5X1.5MM HND WRST NIT CONT - [id number]  STAPLE BNE FIX [id number] WIRE 1.5X1.5MM HND WRST NIT CONT  DEPUY SYNTHES [place] [id number] Left 1 Implanted   STIMULATOR BNE DST TIB FIB TARSAL METATARSAL CASTED APPL - [id number]  STIMULATOR BNE DST TIB FIB TARSAL METATARSAL CASTED APPL  ORTH[employer] INC_WD N/A Left 1 Implanted   STAPLE BNE FIX [id number] WIRE 1.5X1.5MM HND WRST NIT CONT - [id number]  STAPLE BNE FIX [id number] WIRE 1.5X1.5MM HND WRST NIT CONT  DEPUY SYNTHES [place]_ [id number] Left 1 Implanted   COMPONENT TOE JT [id number] L PROX DST X TYP HAMMERLOCK 2 - [id number]  COMPONENT TOE JT [id number] L PROX DST X TYP HAMMERLOCK 2  DEPUY SYNTHES [facility]_ [id number] Left 1 Implanted   IMPLANT PHLANG JT SM W5.6XL17.2MM 0DEG PROX NIT FOR HAMR - [id number]  IMPLANT PHLANG JT SM W5.6XL17.2MM 0DEG PROX NIT FOR HAMR  DEPUY SYNTHES [employer] [id number] Left 1 Implanted       Drains: * No LDAs found *    Findings: Severe bunion left deformity, left foot multiple hammertoes//     Electronically Signed by Jocelyn Nichole MD on 9/18/2020 at 2:18 PM        OPERATIVE REPORT        OPERATIVE PROCEDURE          Arpita Lewis is  taken to the operating room today on 9/18/2020 . Regional block anesthesia was already performed to the left lower extremity prior to her entering the operating room. Arpita Lewis  entered the operating room and was supine. General anesthesia was conducted. The entire left lower extremity from the tip of toes going all the way up to the knee was fully prepped with chlorhexidine gluconate scrub, chlorhexidine gluconate prep stick (2).    A formal timeout was then conducted, identifying correct patient, correct limb, correct location for surgery, confirmation the patient did receive antibiotics, and that I can see my written signature . All members of the surgical team present agreed to sign timeout, as such I elected to proceed.     I exsanguinated the left lower extremity using a tourniquet and insufflated the tourniquet to  300 mm HG. Then, I removed the Esmarch.   I used a marking pen to delineate my part anatomy. The medial portion of the great toe was demarcated. The first interdigital web space demarcation was demarcated as well as a dorsal longitudinal demarcation was centered over the first tarsometatarsal region. I first made a 4-5 cm incision over the first TMT region. Incision made through skin and subcutaneous tissue directly on the first TMT region. The first tarsometatarsal joint was identified and dissected out and the joint capsule was released. I then followed the [address] technique. I first again identified the first TMT region. I made sure that it was nice and mobile, placed a derotation pin at the proximal portion of the first metatarsal.  I then made an incision in the first webspace. Incision made through skin and subcutaneous tissues directly down to the recurrent tight structures laterally. I released the capsule laterally, a small portion of the capsule, as well as the superior suspensory ligament, above the lateral sesamoid. This allowed me to hold the toe in about neutral alignment  without MTP joint subluxation. the joint can not go beyond neutral without the joint now becoming at tilted becoming asymmetric. At this time, I then placed the fulcrum at the proximal lateral portion of the 1st metatarsal and then made a small incision in the second metatarsal such that I could place the (LAPIPLASTY Positioner) positioning instrumentation that would allow me to decrease the DERRICK angle. I then secured this in place, made sure that my first metatarsal was properly derotated (checking toe/metatarsal and sesamoid position. I was very happy with the LAPIPLASTY positioner and the confirmed improved sesamoid alignment distally as we all as correction of the DERRICK. I made sure this was secured in place. I then checked under fluoroscopy, to make sure I was happy with the overall alignment.   I then placed the LAPIPLASTY cutting guide in place (after placing the joint seeker), securing the cutting guide in place, making sure that this is positioned directly down to bone using the 3 pins. I checked under biplanar C arm to make sure that I was able to remove/resect the appropriate amount of bone. I then performed my LAPIPLASTY cuts, removed the cutting guide, removed the bone, at this proposed first tarsometatarsal joint arthrodesis using a rongeur and a pituitary rongeur. I then prepared this after a little bit of irrigation. I then prepared the first tarsometatarsal region for arthrodesis. I made sure that again the metatarsal was properly derotated. I then used a LAPIPLASTY Treace 2 compression guide, compressed quite nicely. It was difficult to get her affect bony opposition, along the lateralmost aspect, of this proposed first TMT joint arthrodesis, because the shape of the metatarsal, was wider than the cuneiform at this level and at this position. Otherwise, the bony opposition was excellent, and the overall alignment was extended also at this first TMT region. The bone was in excellent opposition. I then secured ans definitively fixed the 1st TMT with the following hardware: Orthofix, trace compression 3.0 millimeter screw. As the screw was placed across the first TMT region using standard technique, guidewire, measuring, drilling and placing the appropriate length headless compression screw. Compression screw. Excellent compression was obtained and maintained. I then used a Maria Antonia Trevino #2 dorsal and medial plates, secured them in place provisionally, then screwed them in place with appropriate length screws. Excellent stability was obtained and maintained of this construct.     Bi planar Fluoroscopic films identified excellent compression and excellent positioning of the first tarsometatarsal joint proposed arthrodesis. Tourniquet, had been deflated, after approxi-120 minutes.   The part of the case, that took the longus, was ensuring that I would get the best alignment, the first TMT region, as there was small amount of bone fragment, along the plantar portion of the first TMT region proposed TMT region arthrodesis site but again because the proximal and of the metatarsal, was wired and cuneiform, it took a while to get the best alignment. But overall is very pleased     I then made a medial incision, incision made through skin and subcutaneous tissue directly down to the very thickened capsule. I removed 5 mm of redundant capsule, making a parallel incision. An inverted L capsulotomy was  conducted and I did  have to actually shave the bone medially at the medial eminence. I then tightened my capsule, after thorough irrigation, using  3-0 Vicryl/S medial capsular reconstruction was performed, using 3-0 Vicryl. I then extended my incision distally coming to the medial portion of the great toe proximal phalanx such that this allowed me to expose the medial portion in order to allow me to perform an Akin closing wedge osteotomy. I then used a small sagittal saw to cut from lateral towards medial direction and removed a 3 mm wedge of bone. I then performed a closing wedge osteotomy, using a proper bone staple. I secured my closing wedge Richard osteotomy with a : Synthes J&J, BME bone staples, x 2 .  9 x 10 mm screws. Excellent alignment was obtained and maintained. At this point, I then performed hammertoe corrective PIP resection arthroplasties, through straight linear incisions. Layer incisions were in place, each to each left #2 toe, 3 toe, and fourth toes. Incision made through skin subcutaneous tissues directly on extensor tendon. Elliptical excision of the extensor tendon was then performed condylectomy, was performed, to each, distal end of the proximal phalanx, of each #2 3 and 4 toes making sure this cut was made perpendicular to the long axis of each toe.   I then used a small sagittal saw, to remove a small wedge of bone, from the proximal femoral and, of the middle phalanx, of each the left #2 3 and 4 toes. Thus performing PIP resection arthroplasties of each #2 3 and 4 toes. I then use a BME intramedullary devices, just.  Drilling, the distal end of the proximal phalanx, and the proximal end of the middle phalanx, each the left #2 3 and 4 toes. In doing so, I then drilled, rasped, and then placed the intramedullary implants. I checked under fluoroscopy, and under direct vision, the nature of these is implants are in excellent position. Small implants are placed to the #4, and 3 toes, middle size and an implant was placed in the #2 toe. A look carefully, under fluoroscopy, and under direct vision, make sure the implants are in excellent position. The fourth toe, it looks as if the implant, was not in bone, and the middle phalanx, but looking directly at it clinically goes directly and definitely in bone and when I took the proper view, of the x-ray, it confirmed that the implants were in excellent position and was definitely indeed in bone under multiple biplanar images. It had been deflated, for final closure. To be noted, the tourniquet had to be re inflated, after being down for about 10 minutes. This allowed me to do my modified Loza distal soft tissue procedure, the Akin closing wedge osteotomy, as well as the multiple hammertoes. After thorough irrigation, with copious amounts of antibiotic irrigation, each incision was closed. Each incision was closed with 3-0 Vicryl 3 Monocryl and 3-0 nylon. This is the first webspace incision, the dorsal medial incision, and the medial incision. The small incisions, were closed, with 3-0 Monocryl 3-0 nylon. The hammertoe incision was closed with 3-0 Monocryl 3-0 nylon. Sterile dressings consisted of Xeroform 4 x 4's sterile ABDs and then a 4 inch Ace wrap. Patient was then extubated transferred recovery in stable condition.   I spoke with this patient's , Dr. Dian Schulte, to inform him that surgery gone well. Her privacy, was respected: Before, during, and after her surgical procedure. .  She did not require Loyd. The bone was excellent to the 1st TMT regions and soft to the toes // proximal phalanx. After thorough irrigation, the tourniquet down, I was very pleased with the overall alignment. All incisions were closed with 3-0 Vicryl and then 3-0 nylon. Prior to closure, all needle counts, tape counts and instruments were noted and documented in chart.          Selective electrocautery was used for hemostasis, prior to closure. Prior to closure, correct needle count, tape counts, and instruments were noted and documented in the chart. Kady Murguia is sent to recovery room PACU in stable condition.                         Patient: Kady Murguia                                              MRN:@                                                                          SSN: [id number]   YOB: 1955                                             AGE: 76 y.o.                SEX:  female         Brunilda JIMENEZ function was my first assistant in surgery.          She assisted me in this surgery by performing the followin. Assist in [address] [place] [address] Avenue @ on the OR table. 2. Providing a constant clear visualization of my operative field by providing timely suction, operative light optimization of the surgical field  3. Providing appropriate electrocautery, and assisting in incision closure, placement of surgical dressings/splinting.  Brunilda JIMENEZ's role facilitated my conducting        The surgery more efficiently.        Asuncion Hodge MD  2020  2:22 PM  Culture emergency to consolidate to schedule a right now but given a couple months first assist 1 I like also be did transiently portions of wound was dressing was placed over          Asuncion Hodge MD  2020  2:22 PM

## 2023-06-09 ENCOUNTER — HOSPITAL ENCOUNTER (OUTPATIENT)
Facility: HOSPITAL | Age: 68
Discharge: HOME | End: 2023-06-09
Attending: OBSTETRICS & GYNECOLOGY
Payer: MEDICARE

## 2023-06-09 DIAGNOSIS — R10.31 RIGHT INGUINAL PAIN: ICD-10-CM

## 2023-06-09 PROCEDURE — 76882 US LMTD JT/FCL EVL NVASC XTR: CPT

## 2023-06-10 NOTE — PROGRESS NOTES
Problem: Pain  Goal: *Control of Pain  Outcome: Progressing Towards Goal  Goal: *PALLIATIVE CARE:  Alleviation of Pain  Outcome: Progressing Towards Goal Advancement Flap (Double) Text: The defect edges were debeveled with a #15 scalpel blade.  Given the location of the defect and the proximity to free margins a double advancement flap was deemed most appropriate.  Using a sterile surgical marker, the appropriate advancement flaps were drawn incorporating the defect and placing the expected incisions within the relaxed skin tension lines where possible.    The area thus outlined was incised deep to adipose tissue with a #15 scalpel blade.  The skin margins were undermined to an appropriate distance in all directions utilizing iris scissors.

## 2023-07-12 ENCOUNTER — HOSPITAL ENCOUNTER (OUTPATIENT)
Facility: HOSPITAL | Age: 68
Discharge: HOME | End: 2023-07-15
Attending: OBSTETRICS & GYNECOLOGY
Payer: MEDICARE

## 2023-07-12 DIAGNOSIS — R10.31 ABDOMINAL PAIN, RIGHT LOWER QUADRANT: ICD-10-CM

## 2023-07-12 LAB — CREAT UR-MCNC: 1.1 MG/DL (ref 0.6–1.3)

## 2023-07-12 PROCEDURE — 6360000004 HC RX CONTRAST MEDICATION: Performed by: OBSTETRICS & GYNECOLOGY

## 2023-07-12 PROCEDURE — A9579 GAD-BASE MR CONTRAST NOS,1ML: HCPCS | Performed by: OBSTETRICS & GYNECOLOGY

## 2023-07-12 PROCEDURE — 82565 ASSAY OF CREATININE: CPT

## 2023-07-12 PROCEDURE — 72197 MRI PELVIS W/O & W/DYE: CPT

## 2023-07-12 RX ADMIN — GADOTERIDOL 15 ML: 279.3 INJECTION, SOLUTION INTRAVENOUS at 16:40

## 2024-02-23 ENCOUNTER — TRANSCRIBE ORDERS (OUTPATIENT)
Facility: HOSPITAL | Age: 69
End: 2024-02-23

## 2024-02-23 DIAGNOSIS — N64.4 BREAST PAIN: Primary | ICD-10-CM

## 2024-04-16 ENCOUNTER — HOSPITAL ENCOUNTER (OUTPATIENT)
Facility: HOSPITAL | Age: 69
Discharge: HOME | End: 2024-04-19
Attending: OBSTETRICS & GYNECOLOGY
Payer: MEDICARE

## 2024-04-16 VITALS — HEIGHT: 66 IN | BODY MASS INDEX: 29.57 KG/M2 | WEIGHT: 184 LBS

## 2024-04-16 DIAGNOSIS — N64.4 BREAST PAIN: ICD-10-CM

## 2024-04-16 PROCEDURE — G0279 TOMOSYNTHESIS, MAMMO: HCPCS

## 2024-04-16 PROCEDURE — 76642 ULTRASOUND BREAST LIMITED: CPT

## 2024-05-13 ENCOUNTER — HOSPITAL ENCOUNTER (OUTPATIENT)
Facility: HOSPITAL | Age: 69
Discharge: HOME | End: 2024-05-16
Attending: ORTHOPAEDIC SURGERY
Payer: MEDICARE

## 2024-05-13 DIAGNOSIS — S33.5XXD SPRAIN OF LIGAMENTS OF LUMBAR SPINE, SUBSEQUENT ENCOUNTER: ICD-10-CM

## 2024-05-13 PROCEDURE — 72148 MRI LUMBAR SPINE W/O DYE: CPT

## 2024-10-25 ENCOUNTER — OFFICE VISIT (OUTPATIENT)
Age: 69
End: 2024-10-25

## 2024-10-25 VITALS
DIASTOLIC BLOOD PRESSURE: 73 MMHG | HEIGHT: 66 IN | BODY MASS INDEX: 29.86 KG/M2 | SYSTOLIC BLOOD PRESSURE: 149 MMHG | OXYGEN SATURATION: 93 % | HEART RATE: 53 BPM

## 2024-10-25 DIAGNOSIS — I44.7 LBBB (LEFT BUNDLE BRANCH BLOCK): ICD-10-CM

## 2024-10-25 DIAGNOSIS — E78.2 MIXED HYPERLIPIDEMIA: ICD-10-CM

## 2024-10-25 DIAGNOSIS — R94.31 ABNORMAL ELECTROCARDIOGRAPHY: ICD-10-CM

## 2024-10-25 DIAGNOSIS — Z95.5 PRESENCE OF STENT IN LAD CORONARY ARTERY: ICD-10-CM

## 2024-10-25 DIAGNOSIS — Z01.810 PREOP CARDIOVASCULAR EXAM: ICD-10-CM

## 2024-10-25 DIAGNOSIS — I25.708 CORONARY ARTERY DISEASE OF BYPASS GRAFT OF NATIVE HEART WITH STABLE ANGINA PECTORIS (HCC): Primary | ICD-10-CM

## 2024-10-25 DIAGNOSIS — I10 PRIMARY HYPERTENSION: ICD-10-CM

## 2024-10-25 RX ORDER — AMLODIPINE BESYLATE 5 MG/1
5 TABLET ORAL DAILY
Qty: 90 TABLET | Refills: 3 | Status: SHIPPED | OUTPATIENT
Start: 2024-10-25

## 2024-10-25 RX ORDER — ATORVASTATIN CALCIUM 80 MG/1
80 TABLET, FILM COATED ORAL NIGHTLY
Qty: 90 TABLET | Refills: 3 | Status: SHIPPED | OUTPATIENT
Start: 2024-10-25

## 2024-10-25 RX ORDER — ASPIRIN 81 MG/1
81 TABLET ORAL DAILY
Qty: 100 TABLET
Start: 2024-10-25

## 2024-10-25 ASSESSMENT — PATIENT HEALTH QUESTIONNAIRE - PHQ9
SUM OF ALL RESPONSES TO PHQ QUESTIONS 1-9: 0
2. FEELING DOWN, DEPRESSED OR HOPELESS: NOT AT ALL
SUM OF ALL RESPONSES TO PHQ9 QUESTIONS 1 & 2: 0
1. LITTLE INTEREST OR PLEASURE IN DOING THINGS: NOT AT ALL
SUM OF ALL RESPONSES TO PHQ QUESTIONS 1-9: 0

## 2024-10-25 ASSESSMENT — ENCOUNTER SYMPTOMS
RESPIRATORY NEGATIVE: 1
BACK PAIN: 1
GASTROINTESTINAL NEGATIVE: 1
EYES NEGATIVE: 1

## 2024-10-25 NOTE — PROGRESS NOTES
(PRN contrast/bubble/strain/3D); Future  -     Nuclear stress test with myocardial perfusion; Future    Preop cardiovascular exam          See patient instructions also for other medical advice given    Medications Discontinued During This Encounter   Medication Reason    ergocalciferol (ERGOCALCIFEROL) 1.25 MG (86488 UT) capsule LIST CLEANUP    montelukast (SINGULAIR) 10 MG tablet LIST CLEANUP    amLODIPine (NORVASC) 5 MG tablet REORDER    aspirin 81 MG EC tablet REORDER    atorvastatin (LIPITOR) 80 MG tablet REORDER       Follow-up and Dispositions    Return in about 3 months (around 1/25/2025), or if symptoms worsen or fail to improve, for post test/procedure.           Return to ER if any significant CP not relieved by s/l NTG, severe SOB, severe palpitations, loss of consciousness    10/25/2024  Plan for medicines : Continue current cardiac medications.  Discussed the compliance and she already understands.  Due to left bundle, unknown functional capacity and preop status as well as previous PCI, check echo and stress test for cardiac structure function and ischemia.  Exercise Plan: Encouraged to walk as much as possible.  Diet plan: Mediterranean diet guidelines explained and printed.

## 2024-10-30 ENCOUNTER — TELEPHONE (OUTPATIENT)
Age: 69
End: 2024-10-30

## 2024-10-30 NOTE — TELEPHONE ENCOUNTER
Patient wants you to call her and discuss what you found on her nuclear stress test today. She will be expecting a call today she states. [phone]

## 2024-11-02 NOTE — PROGRESS NOTES
HISTORY OF PRESENT ILLNESS  Iman Samayoa is a 69 y.o. female.    PMH Coronary artery disease history of PCI, HTN, HLD  ----  CARDIAC STUDIES  ----  Nuclear stress test 10/31/2024    Stress Combined Conclusion: Findings suggest a high risk of cardiac events.    Stress Function: Left ventricular function post-stress is abnormal. Post-stress ejection fraction is 39%. The stress end diastolic cavity size is mildly enlarged. Stress end diastolic volume: 117 mL. The stress end systolic cavity size is moderately enlarged. Stress end systolic volume: 71 mL. Stress stroke volume: 45.0 mL.    Perfusion Comments: Prone images were not obtained. Supine obtained LV perfusion is abnormal.    Perfusion Defect: There is a moderate severity left ventricular stress perfusion defect that is medium in size present in the mid to distal anterior segment(s) that is reversible. There is a moderate severity left ventricular stress perfusion defect that is medium in size present in the mid to distal inferior segment(s) that is reversible.    Perfusion Conclusion: There is no evidence of transient ischemic dilation (TID).    Image quality is good.    ECG: Resting ECG demonstrates sinus bradycardia and left bundle branch block.    Stress Test: A pharmacological stress test was performed using regadenoson (Lexiscan). Low level exercise was used during the pharmacological stress test. Hemodynamics are suboptimal due to medication. Blood pressure demonstrated a normal response and heart rate demonstrated a normal response to stress. The patient's heart rate recovery was normal.    SSS=13, SRS=4, SDS=9     High Risk stress test with anterior and inferior reversible defects with reduced ejection fraction  ----  2d echo 10/31/2024    Left Ventricle: Moderately reduced left ventricular systolic function. EF by visual approximation is 40%. Left ventricle size is normal. Increased wall thickness. Findings consistent with mild concentric hypertrophy.

## 2024-11-06 ENCOUNTER — OFFICE VISIT (OUTPATIENT)
Age: 69
End: 2024-11-06

## 2024-11-06 VITALS
OXYGEN SATURATION: 96 % | HEIGHT: 66 IN | HEART RATE: 59 BPM | BODY MASS INDEX: 28.77 KG/M2 | SYSTOLIC BLOOD PRESSURE: 133 MMHG | WEIGHT: 179 LBS | DIASTOLIC BLOOD PRESSURE: 70 MMHG

## 2024-11-06 DIAGNOSIS — I10 HYPERTENSION, UNSPECIFIED TYPE: ICD-10-CM

## 2024-11-06 DIAGNOSIS — I42.8 OTHER CARDIOMYOPATHY (HCC): ICD-10-CM

## 2024-11-06 DIAGNOSIS — E78.49 OTHER HYPERLIPIDEMIA: ICD-10-CM

## 2024-11-06 DIAGNOSIS — R07.2 PRECORDIAL PAIN: Primary | ICD-10-CM

## 2024-11-06 RX ORDER — NITROGLYCERIN 0.4 MG/1
0.4 TABLET SUBLINGUAL EVERY 5 MIN PRN
Qty: 25 TABLET | Refills: 3 | Status: SHIPPED | OUTPATIENT
Start: 2024-11-06 | End: 2024-11-06

## 2024-11-06 RX ORDER — NITROGLYCERIN 0.4 MG/1
0.4 TABLET SUBLINGUAL EVERY 5 MIN PRN
Qty: 25 TABLET | Refills: 0 | Status: SHIPPED | OUTPATIENT
Start: 2024-11-06

## 2024-11-06 ASSESSMENT — ENCOUNTER SYMPTOMS
CHEST TIGHTNESS: 1
NAUSEA: 0
BLOOD IN STOOL: 0
WHEEZING: 0
SHORTNESS OF BREATH: 0
COUGH: 0
DIARRHEA: 0
APNEA: 0
CONSTIPATION: 0
ABDOMINAL PAIN: 0
COLOR CHANGE: 0

## 2024-11-06 NOTE — PROGRESS NOTES
Have you had Fatigue?  Yes, within the past 2 months, with exertion.     2.   Have you had Chest Pain? Yes, with most recent episode 11/3/24, lasted for 10 minutes prior to self administering 4 aspirins and 2 NTG ~ Chest pain resolved within 2 min. Patient described the  pain as \"constant, deep and dull\".      3.   Have you had Dyspnea (SOB) ? No   4.   Have you had Orthopnea? No   5.   Have you had PND? No  6.   Have you had leg swelling? No   7.    Have you had any weight gain? No   8. Have you had any palpitations? No    9. Have you had any syncope? No   10. Do you have any wounds on legs? No

## 2024-11-06 NOTE — PATIENT INSTRUCTIONS
Learning About the Mediterranean Diet  What is the Mediterranean diet?     The Mediterranean diet is a style of eating rather than a diet plan. It features foods eaten in [place], [place], southern [place] and [place], and other countries along the [place] Sea. It emphasizes eating foods like fish, fruits, vegetables, beans, high-fiber breads and whole grains, nuts, and olive oil. This style of eating includes limited red meat, cheese, and sweets.  Why choose the Mediterranean diet?  A Mediterranean-style diet may improve heart health. It contains more fat than other heart-healthy diets. But the fats are mainly from nuts, unsaturated oils (such as fish oils and olive oil), and certain nut or seed oils (such as canola, soybean, or flaxseed oil). These fats may help protect the heart and blood vessels.  How can you get started on the Mediterranean diet?  Here are some things you can do to switch to a more Mediterranean way of eating.  What to eat  Eat a variety of fruits and vegetables each day, such as grapes, blueberries, tomatoes, broccoli, peppers, figs, olives, spinach, eggplant, beans, lentils, and chickpeas.  Eat a variety of whole-grain foods each day, such as oats, brown rice, and whole wheat bread, pasta, and couscous.  Eat fish at least 2 times a week. Try tuna, salmon, mackerel, lake trout, herring, or sardines.  Eat moderate amounts of low-fat dairy products, such as milk, cheese, or yogurt.  Eat moderate amounts of poultry and eggs.  Choose healthy (unsaturated) fats, such as nuts, olive oil, and certain nut or seed oils like canola, soybean, and flaxseed.  Limit unhealthy (saturated) fats, such as butter, palm oil, and coconut oil. And limit fats found in animal products, such as meat and dairy products made with whole milk. Try to eat red meat only a few times a month in very small amounts.  Limit sweets and desserts to only a few times a week. This includes sugar-sweetened drinks like soda.  The

## 2024-11-07 ENCOUNTER — TELEPHONE (OUTPATIENT)
Age: 69
End: 2024-11-07

## 2024-11-07 PROBLEM — R07.9 CHEST PAIN: Status: ACTIVE | Noted: 2024-11-06

## 2024-11-07 NOTE — TELEPHONE ENCOUNTER
[facility]      Left Heart Cath Instructions    You are scheduled to have a Left Heart Catheterization on 11/14/2024 at [facility]. Please arrive and check in at 9:15 a.m..     Please go to [facility] ([address] [place] [address]) and park in the outpatient parking lot that is located around to the back of the hospital ([facility]). You will enter through the [facility] entrance. Once you arrive, check in with the  at the [facility]  with the .     You are not to eat or drink anything after midnight the night before your procedure. Small sips of water with medications is OK.    If you are diabetic, do not take your insulin/sugar pill the morning of the procedure.     Medication Instructions:  Please Take your morning medications with the following special instructions:    [x] Please make sure to take your blood pressure medications with just enough water to swallow    [x]Take your Aspirin and or Plavix/Brilinta with just enough water to swallow    [] Hold (DO NOT TAKE) your [Eliquis, Xarelto, Coumadin, and or Metformin] on . Ask your nurse after the procedure when to resume these mediations.     []  If you have an allergy to Iodine, Contrast, or Shellfish: take Prednisone 60 mg and Benadryl 25 mg by mouth at at bed time the night before the procedure and again morning of the procedure.     6.  We encourage families to wait in the waiting room on the first floor while the procedure is being done. The Doctor will come out and talk with you as soon as the procedure is through. You will need someone to drive you home after you have been discharged from the hospital.     7.  There is a possibility you may spend the night in the hospital depending on the results of the procedure. This will be determined after the procedure is done.     8. If you or your family have any questions, please call our office Monday-Friday 8:30am

## 2024-11-07 NOTE — TELEPHONE ENCOUNTER
----- Message from JOSEFINA LA MA sent at 11/6/2024 10:51 AM EST -----  Regarding: LH cath  Can you set up LH Cath with Dr. Carias ?

## 2024-11-08 NOTE — FLOWSHEET NOTE
Called to verify arrival time of 0930 for 1030 procedure on 11/14. Pre-procedure instructions verified including NPO after midnight and which meds to take and/or hold. All questions answered, patient verbalized understanding.

## 2024-11-14 ENCOUNTER — HOSPITAL ENCOUNTER (OUTPATIENT)
Facility: HOSPITAL | Age: 69
Setting detail: OUTPATIENT SURGERY
Discharge: HOME | End: 2024-11-14
Attending: INTERNAL MEDICINE | Admitting: INTERNAL MEDICINE
Payer: MEDICARE

## 2024-11-14 VITALS
OXYGEN SATURATION: 98 % | DIASTOLIC BLOOD PRESSURE: 81 MMHG | RESPIRATION RATE: 16 BRPM | SYSTOLIC BLOOD PRESSURE: 133 MMHG | HEART RATE: 82 BPM

## 2024-11-14 DIAGNOSIS — R07.9 CHEST PAIN: ICD-10-CM

## 2024-11-14 LAB
ACT BLD: 305 SECS (ref 79–138)
ANION GAP SERPL CALC-SCNC: 8 MMOL/L (ref 3–18)
BASOPHILS # BLD: 0.1 K/UL (ref 0–0.1)
BASOPHILS NFR BLD: 0 % (ref 0–2)
BUN SERPL-MCNC: 25 MG/DL (ref 7–18)
BUN/CREAT SERPL: 22 (ref 12–20)
CALCIUM SERPL-MCNC: 9.2 MG/DL (ref 8.5–10.1)
CHLORIDE SERPL-SCNC: 112 MMOL/L (ref 100–111)
CO2 SERPL-SCNC: 23 MMOL/L (ref 21–32)
CREAT SERPL-MCNC: 1.12 MG/DL (ref 0.6–1.3)
DIFFERENTIAL METHOD BLD: ABNORMAL
EKG DIAGNOSIS: NORMAL
EKG Q-T INTERVAL: 454 MS
EKG QRS DURATION: 168 MS
EKG QTC CALCULATION (BAZETT): 540 MS
EKG R AXIS: -54 DEGREES
EKG T AXIS: 112 DEGREES
EKG VENTRICULAR RATE: 85 BPM
EOSINOPHIL # BLD: 0.2 K/UL (ref 0–0.4)
EOSINOPHIL NFR BLD: 2 % (ref 0–5)
ERYTHROCYTE [DISTWIDTH] IN BLOOD BY AUTOMATED COUNT: 11.6 % (ref 11.6–14.5)
GLUCOSE SERPL-MCNC: 129 MG/DL (ref 74–99)
HCT VFR BLD AUTO: 41 % (ref 35–45)
HGB BLD-MCNC: 13.9 G/DL (ref 12–16)
IMM GRANULOCYTES # BLD AUTO: 0.1 K/UL (ref 0–0.04)
IMM GRANULOCYTES NFR BLD AUTO: 1 % (ref 0–0.5)
INR PPP: 1 (ref 0.9–1.1)
LYMPHOCYTES # BLD: 2.2 K/UL (ref 0.9–3.6)
LYMPHOCYTES NFR BLD: 19 % (ref 21–52)
MCH RBC QN AUTO: 32.2 PG (ref 24–34)
MCHC RBC AUTO-ENTMCNC: 33.9 G/DL (ref 31–37)
MCV RBC AUTO: 94.9 FL (ref 78–100)
MONOCYTES # BLD: 0.7 K/UL (ref 0.05–1.2)
MONOCYTES NFR BLD: 6 % (ref 3–10)
NEUTS SEG # BLD: 8.1 K/UL (ref 1.8–8)
NEUTS SEG NFR BLD: 72 % (ref 40–73)
NRBC # BLD: 0 K/UL (ref 0–0.01)
NRBC BLD-RTO: 0 PER 100 WBC
PLATELET # BLD AUTO: 293 K/UL (ref 135–420)
PMV BLD AUTO: 10.5 FL (ref 9.2–11.8)
POTASSIUM SERPL-SCNC: 3.2 MMOL/L (ref 3.5–5.5)
PROTHROMBIN TIME: 13.6 SEC (ref 11.9–14.9)
RBC # BLD AUTO: 4.32 M/UL (ref 4.2–5.3)
SODIUM SERPL-SCNC: 143 MMOL/L (ref 136–145)
WBC # BLD AUTO: 11.3 K/UL (ref 4.6–13.2)

## 2024-11-14 PROCEDURE — 7100000011 HC PHASE II RECOVERY - ADDTL 15 MIN: Performed by: INTERNAL MEDICINE

## 2024-11-14 PROCEDURE — 6360000004 HC RX CONTRAST MEDICATION: Performed by: INTERNAL MEDICINE

## 2024-11-14 PROCEDURE — C1725 CATH, TRANSLUMIN NON-LASER: HCPCS | Performed by: INTERNAL MEDICINE

## 2024-11-14 PROCEDURE — 85025 COMPLETE CBC W/AUTO DIFF WBC: CPT

## 2024-11-14 PROCEDURE — C1874 STENT, COATED/COV W/DEL SYS: HCPCS | Performed by: INTERNAL MEDICINE

## 2024-11-14 PROCEDURE — 93458 L HRT ARTERY/VENTRICLE ANGIO: CPT | Performed by: INTERNAL MEDICINE

## 2024-11-14 PROCEDURE — 2709999900 HC NON-CHARGEABLE SUPPLY: Performed by: INTERNAL MEDICINE

## 2024-11-14 PROCEDURE — 6360000002 HC RX W HCPCS: Performed by: INTERNAL MEDICINE

## 2024-11-14 PROCEDURE — 92978 ENDOLUMINL IVUS OCT C 1ST: CPT | Performed by: INTERNAL MEDICINE

## 2024-11-14 PROCEDURE — 92972 PERQ TRLUML CORONRY LITHOTRP: CPT | Performed by: INTERNAL MEDICINE

## 2024-11-14 PROCEDURE — C1894 INTRO/SHEATH, NON-LASER: HCPCS | Performed by: INTERNAL MEDICINE

## 2024-11-14 PROCEDURE — C1753 CATH, INTRAVAS ULTRASOUND: HCPCS | Performed by: INTERNAL MEDICINE

## 2024-11-14 PROCEDURE — 76000 FLUOROSCOPY <1 HR PHYS/QHP: CPT | Performed by: INTERNAL MEDICINE

## 2024-11-14 PROCEDURE — C1760 CLOSURE DEV, VASC: HCPCS | Performed by: INTERNAL MEDICINE

## 2024-11-14 PROCEDURE — C1769 GUIDE WIRE: HCPCS | Performed by: INTERNAL MEDICINE

## 2024-11-14 PROCEDURE — 92928 PRQ TCAT PLMT NTRAC ST 1 LES: CPT | Performed by: INTERNAL MEDICINE

## 2024-11-14 PROCEDURE — C1761 HC CATH TRANSLUM INTRAVASCULAR LITHOTRIPSY CORONARY: HCPCS | Performed by: INTERNAL MEDICINE

## 2024-11-14 PROCEDURE — 7100000010 HC PHASE II RECOVERY - FIRST 15 MIN: Performed by: INTERNAL MEDICINE

## 2024-11-14 PROCEDURE — 85347 COAGULATION TIME ACTIVATED: CPT

## 2024-11-14 PROCEDURE — 6370000000 HC RX 637 (ALT 250 FOR IP): Performed by: INTERNAL MEDICINE

## 2024-11-14 PROCEDURE — 76937 US GUIDE VASCULAR ACCESS: CPT | Performed by: INTERNAL MEDICINE

## 2024-11-14 PROCEDURE — 85347 COAGULATION TIME ACTIVATED: CPT | Performed by: INTERNAL MEDICINE

## 2024-11-14 PROCEDURE — 2500000003 HC RX 250 WO HCPCS: Performed by: INTERNAL MEDICINE

## 2024-11-14 PROCEDURE — 80048 BASIC METABOLIC PNL TOTAL CA: CPT

## 2024-11-14 PROCEDURE — C1713 ANCHOR/SCREW BN/BN,TIS/BN: HCPCS | Performed by: INTERNAL MEDICINE

## 2024-11-14 PROCEDURE — 85610 PROTHROMBIN TIME: CPT

## 2024-11-14 PROCEDURE — C1887 CATHETER, GUIDING: HCPCS | Performed by: INTERNAL MEDICINE

## 2024-11-14 DEVICE — STENT ONYXNG35030UX ONYX 3.50X30RX
Type: IMPLANTABLE DEVICE | Status: FUNCTIONAL
Brand: ONYX FRONTIER™

## 2024-11-14 RX ORDER — SODIUM CHLORIDE 9 MG/ML
INJECTION, SOLUTION INTRAVENOUS PRN
Status: DISCONTINUED | OUTPATIENT
Start: 2024-11-14 | End: 2024-11-14 | Stop reason: HOSPADM

## 2024-11-14 RX ORDER — SODIUM CHLORIDE 9 MG/ML
INJECTION, SOLUTION INTRAVENOUS CONTINUOUS
Status: DISCONTINUED | OUTPATIENT
Start: 2024-11-14 | End: 2024-11-14 | Stop reason: HOSPADM

## 2024-11-14 RX ORDER — ACETAMINOPHEN 325 MG/1
650 TABLET ORAL EVERY 4 HOURS PRN
Status: DISCONTINUED | OUTPATIENT
Start: 2024-11-14 | End: 2024-11-14 | Stop reason: HOSPADM

## 2024-11-14 RX ORDER — HEPARIN SODIUM 1000 [USP'U]/ML
INJECTION, SOLUTION INTRAVENOUS; SUBCUTANEOUS PRN
Status: DISCONTINUED | OUTPATIENT
Start: 2024-11-14 | End: 2024-11-14 | Stop reason: HOSPADM

## 2024-11-14 RX ORDER — SODIUM CHLORIDE 0.9 % (FLUSH) 0.9 %
5-40 SYRINGE (ML) INJECTION PRN
Status: DISCONTINUED | OUTPATIENT
Start: 2024-11-14 | End: 2024-11-14 | Stop reason: HOSPADM

## 2024-11-14 RX ORDER — MIDAZOLAM HYDROCHLORIDE 2 MG/2ML
INJECTION, SOLUTION INTRAMUSCULAR; INTRAVENOUS PRN
Status: DISCONTINUED | OUTPATIENT
Start: 2024-11-14 | End: 2024-11-14 | Stop reason: HOSPADM

## 2024-11-14 RX ORDER — ASPIRIN 81 MG/1
81 TABLET, CHEWABLE ORAL DAILY
COMMUNITY

## 2024-11-14 RX ORDER — LIDOCAINE HYDROCHLORIDE 10 MG/ML
INJECTION, SOLUTION EPIDURAL; INFILTRATION; INTRACAUDAL; PERINEURAL PRN
Status: DISCONTINUED | OUTPATIENT
Start: 2024-11-14 | End: 2024-11-14 | Stop reason: HOSPADM

## 2024-11-14 RX ORDER — SODIUM CHLORIDE 0.9 % (FLUSH) 0.9 %
5-40 SYRINGE (ML) INJECTION EVERY 12 HOURS SCHEDULED
Status: DISCONTINUED | OUTPATIENT
Start: 2024-11-14 | End: 2024-11-14 | Stop reason: HOSPADM

## 2024-11-14 RX ORDER — AMLODIPINE BESYLATE 5 MG/1
5 TABLET ORAL DAILY
Status: ON HOLD | COMMUNITY
End: 2024-11-22 | Stop reason: HOSPADM

## 2024-11-14 RX ORDER — OXYCODONE AND ACETAMINOPHEN 5; 325 MG/1; MG/1
1 TABLET ORAL EVERY 4 HOURS PRN
Status: DISCONTINUED | OUTPATIENT
Start: 2024-11-14 | End: 2024-11-14 | Stop reason: HOSPADM

## 2024-11-14 RX ORDER — ASPIRIN 81 MG/1
81 TABLET, CHEWABLE ORAL ONCE
Status: DISCONTINUED | OUTPATIENT
Start: 2024-11-14 | End: 2024-11-14 | Stop reason: HOSPADM

## 2024-11-14 RX ORDER — FENTANYL CITRATE 50 UG/ML
INJECTION, SOLUTION INTRAMUSCULAR; INTRAVENOUS PRN
Status: DISCONTINUED | OUTPATIENT
Start: 2024-11-14 | End: 2024-11-14 | Stop reason: HOSPADM

## 2024-11-14 RX ORDER — IOPAMIDOL 612 MG/ML
INJECTION, SOLUTION INTRAVASCULAR PRN
Status: DISCONTINUED | OUTPATIENT
Start: 2024-11-14 | End: 2024-11-14 | Stop reason: HOSPADM

## 2024-11-14 RX ADMIN — OXYCODONE HYDROCHLORIDE AND ACETAMINOPHEN 1 TABLET: 5; 325 TABLET ORAL at 14:26

## 2024-11-14 ASSESSMENT — PAIN DESCRIPTION - ORIENTATION: ORIENTATION: LOWER;LEFT

## 2024-11-14 ASSESSMENT — PAIN DESCRIPTION - DESCRIPTORS: DESCRIPTORS: ACHING;CRAMPING;DISCOMFORT

## 2024-11-14 ASSESSMENT — PAIN SCALES - GENERAL
PAINLEVEL_OUTOF10: 6
PAINLEVEL_OUTOF10: 5

## 2024-11-14 ASSESSMENT — PAIN DESCRIPTION - FREQUENCY: FREQUENCY: CONTINUOUS

## 2024-11-14 ASSESSMENT — PAIN DESCRIPTION - LOCATION
LOCATION: BACK;GENERALIZED
LOCATION: BACK

## 2024-11-14 ASSESSMENT — PAIN DESCRIPTION - PAIN TYPE: TYPE: CHRONIC PAIN

## 2024-11-14 NOTE — PROGRESS NOTES
Randal Carias MD  Physician  Specialty: Cardiology     Progress Notes     Signed     Encounter Date: 11/6/2024   Related encounter: Office Visit from 11/6/2024 in [facility] [place]     Signed       Expand All Collapse All    HISTORY OF PRESENT ILLNESS  Iman Samayoa is a 69 y.o. female.     PMH Coronary artery disease history of PCI, HTN, HLD  ----  CARDIAC STUDIES  ----  Nuclear stress test 10/31/2024    Stress Combined Conclusion: Findings suggest a high risk of cardiac events.    Stress Function: Left ventricular function post-stress is abnormal. Post-stress ejection fraction is 39%. The stress end diastolic cavity size is mildly enlarged. Stress end diastolic volume: 117 mL. The stress end systolic cavity size is moderately enlarged. Stress end systolic volume: 71 mL. Stress stroke volume: 45.0 mL.    Perfusion Comments: Prone images were not obtained. Supine obtained LV perfusion is abnormal.    Perfusion Defect: There is a moderate severity left ventricular stress perfusion defect that is medium in size present in the mid to distal anterior segment(s) that is reversible. There is a moderate severity left ventricular stress perfusion defect that is medium in size present in the mid to distal inferior segment(s) that is reversible.    Perfusion Conclusion: There is no evidence of transient ischemic dilation (TID).    Image quality is good.    ECG: Resting ECG demonstrates sinus bradycardia and left bundle branch block.    Stress Test: A pharmacological stress test was performed using regadenoson (Lexiscan). Low level exercise was used during the pharmacological stress test. Hemodynamics are suboptimal due to medication. Blood pressure demonstrated a normal response and heart rate demonstrated a normal response to stress. The patient's heart rate recovery was normal.    SSS=13, SRS=4, SDS=9     High Risk stress test with anterior and inferior reversible defects with reduced ejection  angioplasty, for bare metal stent, and for drug eluting stent implants. The need for mandatory uninterrupted dual antiplatelet therapy with lifelong Aspirin combined with Plavix or similar for up to 12 months following drug eluting stents, and minimum 1 month following bare metal stents to prevent stent thrombosis which is the equivalent of acute heart attack has been reviewed in detail.  Patient or healthcare power of  verbalized understanding and all questions were answered.     Cardiomyopathy ejection fraction 40% - Continue with metoprolol succinate 50mg po daily, discussed GDMT.  Start entresto, and check BMP.       HTN - Stage I pressure today, has some lability of blood pressure, continue with metoprolol succinate 50mg po daily, continue with norvasc 5mg po daily.       HLD - Continue with lipitor 80mg po daily, lipid panel periodically survey.

## 2024-11-14 NOTE — PROGRESS NOTES
Cath holding summary:    0928: Patient ambulated from waiting area without difficulty, placed on monitor. A&O x 4, no c/o pain. NPO since midnight, ID and allergies verified. H&P reviewed, med rec completed. PIV x 2 inserted, blood sent to lab. Groin prep completed, consent ready for signature.    1135: Verbal report given to JOANNE Pinedo on Iman Samayoa being transferred to Cath Lab for ordered procedure. Report consisted of patient's Situation, Background, Assessment and Recommendations (SBAR). Information from the following report(s) Nurse Handoff Report, Adult Overview, and MAR was reviewed with the receiving nurse. Opportunity for questions and clarification was provided.    1314: Verbal report received from JOANNE Owen on Iman Samayoa being received from Cath Lab for ordered procedure. Report consisted of patient's Situation, Background, Assessment and Recommendations (SBAR). Information from the following report(s) Nurse Handoff Report, Adult Overview, Surgery Report, and MAR was reviewed with the receiving nurse. Pt A&O x 4, no c/o pain. Opportunity for questions and clarification provided.  Procedure: Cardiac Cath  Intervention: Yes  Site: Right, Groin    1645: AVS Discharge instructions reviewed with patient and copy given to patient.  All questions answered.  Patient verbalized understanding to all discharge instructions, including when to resume all medications prescribed.  PIV removed. Procedural site within normal limits.  No hematoma or bleeding noted from procedural and PIV site. No pain noted at discharge. Patient back to neurological baseline, A&O x 4. Patient discharged in the presence of a responsible adult (, Mr. Samayoa) who will accompany patient home and is able to report post procedure complications.

## 2024-11-15 ENCOUNTER — TELEPHONE (OUTPATIENT)
Age: 69
End: 2024-11-15

## 2024-11-15 NOTE — TELEPHONE ENCOUNTER
[facility]      Left Heart Catheterization Instructions    You are scheduled to have a Left Heart Catheterization on 11/21/2024 at [facility]. Please arrive and check in at 10:30 a.m.     Please go to [facility] ([address] [place] [address]) and park in the outpatient parking lot that is located around to the back of the hospital ([facility]). You will enter through the [facility] entrance. Once you arrive, check in with the  at the [facility]  with the .     You are not to eat or drink anything after midnight the night before your procedure. Small sips of water with medications is OK.    If you are diabetic, do not take your insulin/sugar pill the morning of the procedure.     Medication Instructions:  Please Take your morning medications with the following special instructions:    [x] Please make sure to take your blood pressure medications with just enough water to swallow    [x]Take your Aspirin and or Plavix/Brilinta with just enough water to swallow    [] Hold (DO NOT TAKE) your [Eliquis, Xarelto, Coumadin, and or Metformin] on . Ask your nurse after the procedure when to resume these mediations.     []  If you have an allergy to Iodine, Contrast, or Shellfish: take Prednisone 60 mg and Benadryl 25 mg by mouth at at bed time the night before the procedure and again morning of the procedure.     6.  We encourage families to wait in the waiting room on the first floor while the procedure is being done. The Doctor will come out and talk with you as soon as the procedure is through. You will need someone to drive you home after you have been discharged from the hospital.     7.  There is a possibility you may spend the night in the hospital depending on the results of the procedure. This will be determined after the procedure is done.     8. If you or your family have any questions, please call our office

## 2024-11-15 NOTE — TELEPHONE ENCOUNTER
----- Message from Dr. Randal Carias MD sent at 11/15/2024  9:57 AM EST -----  Please place patient for staged PCI of the RCA, can probably use the same code.  Please place this for the patient next week.      Thanks  TV

## 2024-11-19 NOTE — FLOWSHEET NOTE
Called to verify arrival time of 1045 for 1145 procedure on 11/21. Pre-procedure instructions verified including NPO after midnight and which meds to take and/or hold. All questions answered, patient verbalized understanding.

## 2024-11-21 ENCOUNTER — HOSPITAL ENCOUNTER (OUTPATIENT)
Facility: HOSPITAL | Age: 69
Setting detail: OBSERVATION
LOS: 1 days | Discharge: HOME | End: 2024-11-22
Attending: INTERNAL MEDICINE | Admitting: INTERNAL MEDICINE
Payer: MEDICARE

## 2024-11-21 DIAGNOSIS — I42.8 NON-ISCHEMIC CARDIOMYOPATHY (HCC): ICD-10-CM

## 2024-11-21 DIAGNOSIS — R07.2 PRECORDIAL PAIN: ICD-10-CM

## 2024-11-21 PROBLEM — Z98.890 S/P CARDIAC CATH: Status: ACTIVE | Noted: 2024-11-21

## 2024-11-21 LAB
ACT BLD: 311 SECS (ref 79–138)
ACT BLD: 360 SECS (ref 79–138)
ACT BLD: 611 SECS (ref 79–138)
ALBUMIN SERPL-MCNC: 3.3 G/DL (ref 3.4–5)
ALBUMIN/GLOB SERPL: 1.2 (ref 0.8–1.7)
ALP SERPL-CCNC: 58 U/L (ref 45–117)
ALT SERPL-CCNC: 15 U/L (ref 13–56)
ANION GAP SERPL CALC-SCNC: 4 MMOL/L (ref 3–18)
AST SERPL-CCNC: 23 U/L (ref 10–38)
BASOPHILS # BLD: 0.1 K/UL (ref 0–0.1)
BASOPHILS NFR BLD: 0 % (ref 0–2)
BILIRUB SERPL-MCNC: 0.6 MG/DL (ref 0.2–1)
BUN SERPL-MCNC: 20 MG/DL (ref 7–18)
BUN/CREAT SERPL: 20 (ref 12–20)
CALCIUM SERPL-MCNC: 8.9 MG/DL (ref 8.5–10.1)
CHLORIDE SERPL-SCNC: 112 MMOL/L (ref 100–111)
CO2 SERPL-SCNC: 25 MMOL/L (ref 21–32)
CREAT SERPL-MCNC: 0.98 MG/DL (ref 0.6–1.3)
DIFFERENTIAL METHOD BLD: ABNORMAL
ECHO BSA: 1.91 M2
EOSINOPHIL # BLD: 0.2 K/UL (ref 0–0.4)
EOSINOPHIL NFR BLD: 1 % (ref 0–5)
ERYTHROCYTE [DISTWIDTH] IN BLOOD BY AUTOMATED COUNT: 11.9 % (ref 11.6–14.5)
GLOBULIN SER CALC-MCNC: 2.7 G/DL (ref 2–4)
GLUCOSE SERPL-MCNC: 149 MG/DL (ref 74–99)
HCT VFR BLD AUTO: 37.7 % (ref 35–45)
HGB BLD-MCNC: 12.9 G/DL (ref 12–16)
IMM GRANULOCYTES # BLD AUTO: 0.1 K/UL (ref 0–0.04)
IMM GRANULOCYTES NFR BLD AUTO: 0 % (ref 0–0.5)
LYMPHOCYTES # BLD: 1.8 K/UL (ref 0.9–3.6)
LYMPHOCYTES NFR BLD: 15 % (ref 21–52)
MCH RBC QN AUTO: 32.3 PG (ref 24–34)
MCHC RBC AUTO-ENTMCNC: 34.2 G/DL (ref 31–37)
MCV RBC AUTO: 94.3 FL (ref 78–100)
MONOCYTES # BLD: 0.8 K/UL (ref 0.05–1.2)
MONOCYTES NFR BLD: 7 % (ref 3–10)
NEUTS SEG # BLD: 9 K/UL (ref 1.8–8)
NEUTS SEG NFR BLD: 76 % (ref 40–73)
NRBC # BLD: 0 K/UL (ref 0–0.01)
NRBC BLD-RTO: 0 PER 100 WBC
PLATELET # BLD AUTO: 289 K/UL (ref 135–420)
PMV BLD AUTO: 10.4 FL (ref 9.2–11.8)
POTASSIUM SERPL-SCNC: 3.8 MMOL/L (ref 3.5–5.5)
PROT SERPL-MCNC: 6 G/DL (ref 6.4–8.2)
RBC # BLD AUTO: 4 M/UL (ref 4.2–5.3)
SODIUM SERPL-SCNC: 141 MMOL/L (ref 136–145)
WBC # BLD AUTO: 11.9 K/UL (ref 4.6–13.2)

## 2024-11-21 PROCEDURE — 6360000002 HC RX W HCPCS: Performed by: INTERNAL MEDICINE

## 2024-11-21 PROCEDURE — 92978 ENDOLUMINL IVUS OCT C 1ST: CPT | Performed by: INTERNAL MEDICINE

## 2024-11-21 PROCEDURE — 6360000004 HC RX CONTRAST MEDICATION: Performed by: INTERNAL MEDICINE

## 2024-11-21 PROCEDURE — 99222 1ST HOSP IP/OBS MODERATE 55: CPT | Performed by: INTERNAL MEDICINE

## 2024-11-21 PROCEDURE — C1874 STENT, COATED/COV W/DEL SYS: HCPCS | Performed by: INTERNAL MEDICINE

## 2024-11-21 PROCEDURE — C1725 CATH, TRANSLUMIN NON-LASER: HCPCS | Performed by: INTERNAL MEDICINE

## 2024-11-21 PROCEDURE — C1894 INTRO/SHEATH, NON-LASER: HCPCS | Performed by: INTERNAL MEDICINE

## 2024-11-21 PROCEDURE — 2580000003 HC RX 258: Performed by: INTERNAL MEDICINE

## 2024-11-21 PROCEDURE — 96372 THER/PROPH/DIAG INJ SC/IM: CPT

## 2024-11-21 PROCEDURE — 93005 ELECTROCARDIOGRAM TRACING: CPT | Performed by: INTERNAL MEDICINE

## 2024-11-21 PROCEDURE — 99152 MOD SED SAME PHYS/QHP 5/>YRS: CPT | Performed by: INTERNAL MEDICINE

## 2024-11-21 PROCEDURE — C9600 PERC DRUG-EL COR STENT SING: HCPCS | Performed by: INTERNAL MEDICINE

## 2024-11-21 PROCEDURE — 6370000000 HC RX 637 (ALT 250 FOR IP): Performed by: INTERNAL MEDICINE

## 2024-11-21 PROCEDURE — 36415 COLL VENOUS BLD VENIPUNCTURE: CPT

## 2024-11-21 PROCEDURE — C1753 CATH, INTRAVAS ULTRASOUND: HCPCS | Performed by: INTERNAL MEDICINE

## 2024-11-21 PROCEDURE — 85347 COAGULATION TIME ACTIVATED: CPT

## 2024-11-21 PROCEDURE — C1769 GUIDE WIRE: HCPCS | Performed by: INTERNAL MEDICINE

## 2024-11-21 PROCEDURE — C1713 ANCHOR/SCREW BN/BN,TIS/BN: HCPCS | Performed by: INTERNAL MEDICINE

## 2024-11-21 PROCEDURE — C1887 CATHETER, GUIDING: HCPCS | Performed by: INTERNAL MEDICINE

## 2024-11-21 PROCEDURE — 7100000010 HC PHASE II RECOVERY - FIRST 15 MIN: Performed by: INTERNAL MEDICINE

## 2024-11-21 PROCEDURE — 2500000003 HC RX 250 WO HCPCS: Performed by: INTERNAL MEDICINE

## 2024-11-21 PROCEDURE — 76937 US GUIDE VASCULAR ACCESS: CPT | Performed by: INTERNAL MEDICINE

## 2024-11-21 PROCEDURE — 85025 COMPLETE CBC W/AUTO DIFF WBC: CPT

## 2024-11-21 PROCEDURE — G0378 HOSPITAL OBSERVATION PER HR: HCPCS

## 2024-11-21 PROCEDURE — 2709999900 HC NON-CHARGEABLE SUPPLY: Performed by: INTERNAL MEDICINE

## 2024-11-21 PROCEDURE — 92928 PRQ TCAT PLMT NTRAC ST 1 LES: CPT | Performed by: INTERNAL MEDICINE

## 2024-11-21 PROCEDURE — 93458 L HRT ARTERY/VENTRICLE ANGIO: CPT | Performed by: INTERNAL MEDICINE

## 2024-11-21 PROCEDURE — 76000 FLUOROSCOPY <1 HR PHYS/QHP: CPT | Performed by: INTERNAL MEDICINE

## 2024-11-21 PROCEDURE — C1760 CLOSURE DEV, VASC: HCPCS | Performed by: INTERNAL MEDICINE

## 2024-11-21 PROCEDURE — 7100000011 HC PHASE II RECOVERY - ADDTL 15 MIN: Performed by: INTERNAL MEDICINE

## 2024-11-21 PROCEDURE — 80053 COMPREHEN METABOLIC PANEL: CPT

## 2024-11-21 DEVICE — STENT ONYXNG25038UX ONYX 2.50X38RX
Type: IMPLANTABLE DEVICE | Status: FUNCTIONAL
Brand: ONYX FRONTIER™

## 2024-11-21 RX ORDER — AMLODIPINE BESYLATE 5 MG/1
5 TABLET ORAL DAILY
Status: DISCONTINUED | OUTPATIENT
Start: 2024-11-22 | End: 2024-11-22

## 2024-11-21 RX ORDER — ACETAMINOPHEN 650 MG/1
650 SUPPOSITORY RECTAL EVERY 6 HOURS PRN
Status: DISCONTINUED | OUTPATIENT
Start: 2024-11-21 | End: 2024-11-22 | Stop reason: HOSPADM

## 2024-11-21 RX ORDER — ATORVASTATIN CALCIUM 40 MG/1
80 TABLET, FILM COATED ORAL NIGHTLY
Status: DISCONTINUED | OUTPATIENT
Start: 2024-11-21 | End: 2024-11-22 | Stop reason: HOSPADM

## 2024-11-21 RX ORDER — SODIUM CHLORIDE 9 MG/ML
INJECTION, SOLUTION INTRAVENOUS PRN
Status: DISCONTINUED | OUTPATIENT
Start: 2024-11-21 | End: 2024-11-22 | Stop reason: HOSPADM

## 2024-11-21 RX ORDER — ONDANSETRON 4 MG/1
4 TABLET, ORALLY DISINTEGRATING ORAL EVERY 8 HOURS PRN
Status: DISCONTINUED | OUTPATIENT
Start: 2024-11-21 | End: 2024-11-22 | Stop reason: HOSPADM

## 2024-11-21 RX ORDER — ASPIRIN 81 MG/1
81 TABLET, CHEWABLE ORAL DAILY
Status: DISCONTINUED | OUTPATIENT
Start: 2024-11-22 | End: 2024-11-22 | Stop reason: HOSPADM

## 2024-11-21 RX ORDER — ENOXAPARIN SODIUM 100 MG/ML
40 INJECTION SUBCUTANEOUS DAILY
Status: DISCONTINUED | OUTPATIENT
Start: 2024-11-21 | End: 2024-11-22 | Stop reason: HOSPADM

## 2024-11-21 RX ORDER — NITROGLYCERIN 0.4 MG/1
0.4 TABLET SUBLINGUAL EVERY 5 MIN PRN
Status: DISCONTINUED | OUTPATIENT
Start: 2024-11-21 | End: 2024-11-22 | Stop reason: HOSPADM

## 2024-11-21 RX ORDER — SODIUM CHLORIDE 9 MG/ML
INJECTION, SOLUTION INTRAVENOUS PRN
Status: DISCONTINUED | OUTPATIENT
Start: 2024-11-21 | End: 2024-11-21 | Stop reason: SDUPTHER

## 2024-11-21 RX ORDER — MORPHINE SULFATE 2 MG/ML
2 INJECTION, SOLUTION INTRAMUSCULAR; INTRAVENOUS EVERY 4 HOURS PRN
Status: DISCONTINUED | OUTPATIENT
Start: 2024-11-21 | End: 2024-11-22 | Stop reason: HOSPADM

## 2024-11-21 RX ORDER — MAGNESIUM SULFATE IN WATER 40 MG/ML
2000 INJECTION, SOLUTION INTRAVENOUS PRN
Status: DISCONTINUED | OUTPATIENT
Start: 2024-11-21 | End: 2024-11-22 | Stop reason: HOSPADM

## 2024-11-21 RX ORDER — ONDANSETRON 2 MG/ML
4 INJECTION INTRAMUSCULAR; INTRAVENOUS EVERY 6 HOURS PRN
Status: DISCONTINUED | OUTPATIENT
Start: 2024-11-21 | End: 2024-11-22 | Stop reason: HOSPADM

## 2024-11-21 RX ORDER — POLYETHYLENE GLYCOL 3350 17 G/17G
17 POWDER, FOR SOLUTION ORAL DAILY PRN
Status: DISCONTINUED | OUTPATIENT
Start: 2024-11-21 | End: 2024-11-22 | Stop reason: HOSPADM

## 2024-11-21 RX ORDER — ZOLPIDEM TARTRATE 5 MG/1
10 TABLET ORAL NIGHTLY
Status: DISCONTINUED | OUTPATIENT
Start: 2024-11-21 | End: 2024-11-22 | Stop reason: HOSPADM

## 2024-11-21 RX ORDER — POTASSIUM CHLORIDE 7.45 MG/ML
10 INJECTION INTRAVENOUS PRN
Status: DISCONTINUED | OUTPATIENT
Start: 2024-11-21 | End: 2024-11-22 | Stop reason: HOSPADM

## 2024-11-21 RX ORDER — METOPROLOL SUCCINATE 50 MG/1
50 TABLET, EXTENDED RELEASE ORAL DAILY
Status: DISCONTINUED | OUTPATIENT
Start: 2024-11-22 | End: 2024-11-22 | Stop reason: HOSPADM

## 2024-11-21 RX ORDER — SODIUM CHLORIDE 9 MG/ML
INJECTION, SOLUTION INTRAVENOUS CONTINUOUS
Status: DISCONTINUED | OUTPATIENT
Start: 2024-11-21 | End: 2024-11-21 | Stop reason: HOSPADM

## 2024-11-21 RX ORDER — IOPAMIDOL 612 MG/ML
INJECTION, SOLUTION INTRAVASCULAR PRN
Status: DISCONTINUED | OUTPATIENT
Start: 2024-11-21 | End: 2024-11-21 | Stop reason: HOSPADM

## 2024-11-21 RX ORDER — SODIUM CHLORIDE 0.9 % (FLUSH) 0.9 %
5-40 SYRINGE (ML) INJECTION PRN
Status: DISCONTINUED | OUTPATIENT
Start: 2024-11-21 | End: 2024-11-21 | Stop reason: SDUPTHER

## 2024-11-21 RX ORDER — NITROGLYCERIN 0.4 MG/1
0.4 TABLET SUBLINGUAL ONCE
Status: COMPLETED | OUTPATIENT
Start: 2024-11-21 | End: 2024-11-21

## 2024-11-21 RX ORDER — FENTANYL CITRATE 50 UG/ML
INJECTION, SOLUTION INTRAMUSCULAR; INTRAVENOUS PRN
Status: DISCONTINUED | OUTPATIENT
Start: 2024-11-21 | End: 2024-11-21 | Stop reason: HOSPADM

## 2024-11-21 RX ORDER — PHENAZOPYRIDINE HYDROCHLORIDE 100 MG/1
100 TABLET, FILM COATED ORAL 3 TIMES DAILY PRN
Status: DISCONTINUED | OUTPATIENT
Start: 2024-11-21 | End: 2024-11-22 | Stop reason: HOSPADM

## 2024-11-21 RX ORDER — ASPIRIN 81 MG/1
81 TABLET, CHEWABLE ORAL ONCE
Status: DISCONTINUED | OUTPATIENT
Start: 2024-11-21 | End: 2024-11-21

## 2024-11-21 RX ORDER — SODIUM CHLORIDE 0.9 % (FLUSH) 0.9 %
5-40 SYRINGE (ML) INJECTION EVERY 12 HOURS SCHEDULED
Status: DISCONTINUED | OUTPATIENT
Start: 2024-11-21 | End: 2024-11-21 | Stop reason: SDUPTHER

## 2024-11-21 RX ORDER — SODIUM CHLORIDE 0.9 % (FLUSH) 0.9 %
5-40 SYRINGE (ML) INJECTION PRN
Status: DISCONTINUED | OUTPATIENT
Start: 2024-11-21 | End: 2024-11-22 | Stop reason: HOSPADM

## 2024-11-21 RX ORDER — OXYCODONE HYDROCHLORIDE 5 MG/1
5 TABLET ORAL EVERY 8 HOURS PRN
Status: DISCONTINUED | OUTPATIENT
Start: 2024-11-21 | End: 2024-11-22 | Stop reason: HOSPADM

## 2024-11-21 RX ORDER — ACETAMINOPHEN 325 MG/1
650 TABLET ORAL EVERY 4 HOURS PRN
Status: DISCONTINUED | OUTPATIENT
Start: 2024-11-21 | End: 2024-11-21

## 2024-11-21 RX ORDER — 0.9 % SODIUM CHLORIDE 0.9 %
INTRAVENOUS SOLUTION INTRAVENOUS CONTINUOUS PRN
Status: COMPLETED | OUTPATIENT
Start: 2024-11-21 | End: 2024-11-21

## 2024-11-21 RX ORDER — ACETAMINOPHEN 325 MG/1
650 TABLET ORAL EVERY 6 HOURS PRN
Status: DISCONTINUED | OUTPATIENT
Start: 2024-11-21 | End: 2024-11-21 | Stop reason: SDUPTHER

## 2024-11-21 RX ORDER — HEPARIN SODIUM 1000 [USP'U]/ML
INJECTION, SOLUTION INTRAVENOUS; SUBCUTANEOUS PRN
Status: DISCONTINUED | OUTPATIENT
Start: 2024-11-21 | End: 2024-11-21 | Stop reason: HOSPADM

## 2024-11-21 RX ORDER — MIDAZOLAM HYDROCHLORIDE 1 MG/ML
INJECTION, SOLUTION INTRAMUSCULAR; INTRAVENOUS PRN
Status: DISCONTINUED | OUTPATIENT
Start: 2024-11-21 | End: 2024-11-21 | Stop reason: HOSPADM

## 2024-11-21 RX ORDER — SODIUM CHLORIDE 0.9 % (FLUSH) 0.9 %
5-40 SYRINGE (ML) INJECTION EVERY 12 HOURS SCHEDULED
Status: DISCONTINUED | OUTPATIENT
Start: 2024-11-21 | End: 2024-11-22 | Stop reason: HOSPADM

## 2024-11-21 RX ORDER — ACETAMINOPHEN 325 MG/1
650 TABLET ORAL EVERY 6 HOURS PRN
Status: DISCONTINUED | OUTPATIENT
Start: 2024-11-21 | End: 2024-11-22 | Stop reason: HOSPADM

## 2024-11-21 RX ADMIN — Medication 0.4 MG: at 17:26

## 2024-11-21 RX ADMIN — TICAGRELOR 90 MG: 90 TABLET ORAL at 23:34

## 2024-11-21 RX ADMIN — ZOLPIDEM TARTRATE 10 MG: 5 TABLET ORAL at 23:34

## 2024-11-21 RX ADMIN — ATORVASTATIN CALCIUM 80 MG: 40 TABLET, FILM COATED ORAL at 23:34

## 2024-11-21 RX ADMIN — ENOXAPARIN SODIUM 40 MG: 100 INJECTION SUBCUTANEOUS at 20:28

## 2024-11-21 RX ADMIN — ACETAMINOPHEN 325MG 650 MG: 325 TABLET ORAL at 16:48

## 2024-11-21 RX ADMIN — SODIUM CHLORIDE, PRESERVATIVE FREE 10 ML: 5 INJECTION INTRAVENOUS at 20:27

## 2024-11-21 ASSESSMENT — PAIN SCALES - GENERAL
PAINLEVEL_OUTOF10: 8
PAINLEVEL_OUTOF10: 0
PAINLEVEL_OUTOF10: 8
PAINLEVEL_OUTOF10: 0

## 2024-11-21 ASSESSMENT — PAIN DESCRIPTION - DESCRIPTORS: DESCRIPTORS: PRESSURE;HEAVINESS

## 2024-11-21 ASSESSMENT — PAIN DESCRIPTION - LOCATION
LOCATION: CHEST
LOCATION: CHEST

## 2024-11-21 NOTE — CONSULTS
Mental Status: She is alert. Mental status is at baseline.      Motor: No weakness.      Gait: Gait normal.   Psychiatric:         Mood and Affect: Mood normal.         Behavior: Behavior normal.         Thought Content: Thought content normal.            ASSESSMENT and PLAN  Ms. Samayoa has a reminder for a \"due or due soon\" health maintenance. I have asked that she contact her primary care provider for follow-up on this health maintenance.     Coronary artery disease history of PCI - Nuclear stress test 10/2024 with high risk finding anterior and inferior reversible defects present.  On maximal anti-anginal therapy with some volatility to blood pressure, on metoprolol succinate 50mg po daily, Substitute the norvasc instead for entresto GDMT  Continue with aspirin 81mg po daily.  The benefits and risks of cardiac catheterization have been discussed in detail with the patient or healthcare power of . Patient understands  risk of potential cath complications including but not limited to bleeding, infection, dialysis or renal function decline, difficulty healing the arteriotomy access site which may require surgical repair, potential thromboembolic complications which could result in stroke, myocardial infarction, vascular injury, loss of limb or organ function and/or death and potential allergic reaction to contrast dye or other medication used during the procedure. Patient is also aware of the therapeutic implications for medical management vs coronary artery bypass surgery vs percutaneous coronary intervention in treatment of coronary artery disease. The additional risks for percutaneous coronary artery intervention include the need for emergent bypass surgery  and for restenosis for plain balloon angioplasty, for bare metal stent, and for drug eluting stent implants. The need for mandatory uninterrupted dual antiplatelet therapy with lifelong Aspirin combined with Plavix or similar for up to 12 months

## 2024-11-21 NOTE — PROGRESS NOTES
Cath holding summary:    1045: Patient ambulated from waiting area without difficulty, placed on monitor SR. A&O x4, no c/o pain. NPO since midnight, ID and allergies verified. H&P reviewed, med rec completed. PIV x2 inserted, blood sent to lab. Groin prep completed, consent ready for signature.    1215: Verbal report given to Sita on Iman Samayoa being transferred to Cath Lab for ordered procedure. Report consisted of patient's Situation, Background, Assessment and Recommendations (SBAR). Information from the following report(s) Nurse Handoff Report, Intake/Output, MAR, Recent Results, Med Rec Status, Cardiac Rhythm SR, Pre Procedure Checklist, and Procedure Verification was reviewed with the receiving nurse. Opportunity for questions and clarification was provided.    1406: Verbal report received from Alf on Iman Samayoa being received from Cath Lab for routine post-op. Report consisted of patient's Situation, Background, Assessment and Recommendations (SBAR). Information from the following report(s) Nurse Handoff Report, Surgery Report, Intake/Output, MAR, Recent Results, Med Rec Status, Cardiac Rhythm Sinus Dimas, and Event Log was reviewed with the receiving nurse. Pt A&O x4, no c/o pain. Opportunity for questions and clarification provided.  Procedure: Cardiac Cath  Intervention: Yes  Site: Left, Groin    1515: Post procedure EKG completed.    1555: Lunch box provided to pt.    1705: Subsequent EKG performed due to pt complaining of chest pain 8/10 described as \"pressure\". Pt requesting a sublingual Nitro. Awaiting reply from Dr. Carias.    1800: Pt with non symptomatic hypotension post Nitro administration. Improving with IV fluid bolus. Current BP 93/62.    1840: TRANSFER - OUT REPORT:    Verbal report given to GERARD Vicente on Iman Samayoa  being transferred to  for routine progression of patient care       Report consisted of patient's Situation, Background, Assessment and

## 2024-11-22 VITALS
HEART RATE: 68 BPM | RESPIRATION RATE: 18 BRPM | WEIGHT: 172 LBS | DIASTOLIC BLOOD PRESSURE: 70 MMHG | SYSTOLIC BLOOD PRESSURE: 123 MMHG | HEIGHT: 66 IN | TEMPERATURE: 97.7 F | OXYGEN SATURATION: 97 % | BODY MASS INDEX: 27.64 KG/M2

## 2024-11-22 LAB
ANION GAP SERPL CALC-SCNC: 7 MMOL/L (ref 3–18)
BASOPHILS # BLD: 0.1 K/UL (ref 0–0.1)
BASOPHILS NFR BLD: 1 % (ref 0–2)
BUN SERPL-MCNC: 22 MG/DL (ref 7–18)
BUN/CREAT SERPL: 25 (ref 12–20)
CALCIUM SERPL-MCNC: 8.7 MG/DL (ref 8.5–10.1)
CHLORIDE SERPL-SCNC: 114 MMOL/L (ref 100–111)
CO2 SERPL-SCNC: 22 MMOL/L (ref 21–32)
CREAT SERPL-MCNC: 0.87 MG/DL (ref 0.6–1.3)
DIFFERENTIAL METHOD BLD: ABNORMAL
EKG ATRIAL RATE: 45 BPM
EKG ATRIAL RATE: 49 BPM
EKG DIAGNOSIS: NORMAL
EKG DIAGNOSIS: NORMAL
EKG P AXIS: 29 DEGREES
EKG P AXIS: 37 DEGREES
EKG P-R INTERVAL: 212 MS
EKG P-R INTERVAL: 214 MS
EKG Q-T INTERVAL: 544 MS
EKG Q-T INTERVAL: 572 MS
EKG QRS DURATION: 162 MS
EKG QRS DURATION: 170 MS
EKG QTC CALCULATION (BAZETT): 491 MS
EKG QTC CALCULATION (BAZETT): 494 MS
EKG R AXIS: -48 DEGREES
EKG R AXIS: -51 DEGREES
EKG T AXIS: 136 DEGREES
EKG T AXIS: 141 DEGREES
EKG VENTRICULAR RATE: 45 BPM
EKG VENTRICULAR RATE: 49 BPM
EOSINOPHIL # BLD: 0.2 K/UL (ref 0–0.4)
EOSINOPHIL NFR BLD: 2 % (ref 0–5)
ERYTHROCYTE [DISTWIDTH] IN BLOOD BY AUTOMATED COUNT: 12 % (ref 11.6–14.5)
GLUCOSE SERPL-MCNC: 93 MG/DL (ref 74–99)
HCT VFR BLD AUTO: 34.3 % (ref 35–45)
HGB BLD-MCNC: 11.7 G/DL (ref 12–16)
IMM GRANULOCYTES # BLD AUTO: 0 K/UL (ref 0–0.04)
IMM GRANULOCYTES NFR BLD AUTO: 0 % (ref 0–0.5)
LYMPHOCYTES # BLD: 2.6 K/UL (ref 0.9–3.6)
LYMPHOCYTES NFR BLD: 30 % (ref 21–52)
MCH RBC QN AUTO: 32.2 PG (ref 24–34)
MCHC RBC AUTO-ENTMCNC: 34.1 G/DL (ref 31–37)
MCV RBC AUTO: 94.5 FL (ref 78–100)
MONOCYTES # BLD: 0.9 K/UL (ref 0.05–1.2)
MONOCYTES NFR BLD: 10 % (ref 3–10)
NEUTS SEG # BLD: 5 K/UL (ref 1.8–8)
NEUTS SEG NFR BLD: 57 % (ref 40–73)
NRBC # BLD: 0 K/UL (ref 0–0.01)
NRBC BLD-RTO: 0 PER 100 WBC
PLATELET # BLD AUTO: 255 K/UL (ref 135–420)
PMV BLD AUTO: 10.8 FL (ref 9.2–11.8)
POTASSIUM SERPL-SCNC: 3.6 MMOL/L (ref 3.5–5.5)
RBC # BLD AUTO: 3.63 M/UL (ref 4.2–5.3)
SODIUM SERPL-SCNC: 143 MMOL/L (ref 136–145)
WBC # BLD AUTO: 8.7 K/UL (ref 4.6–13.2)

## 2024-11-22 PROCEDURE — 6360000002 HC RX W HCPCS: Performed by: INTERNAL MEDICINE

## 2024-11-22 PROCEDURE — 99232 SBSQ HOSP IP/OBS MODERATE 35: CPT | Performed by: INTERNAL MEDICINE

## 2024-11-22 PROCEDURE — 6370000000 HC RX 637 (ALT 250 FOR IP): Performed by: INTERNAL MEDICINE

## 2024-11-22 PROCEDURE — 96372 THER/PROPH/DIAG INJ SC/IM: CPT

## 2024-11-22 PROCEDURE — 85025 COMPLETE CBC W/AUTO DIFF WBC: CPT

## 2024-11-22 PROCEDURE — G0378 HOSPITAL OBSERVATION PER HR: HCPCS

## 2024-11-22 PROCEDURE — 93010 ELECTROCARDIOGRAM REPORT: CPT | Performed by: INTERNAL MEDICINE

## 2024-11-22 PROCEDURE — 2580000003 HC RX 258: Performed by: INTERNAL MEDICINE

## 2024-11-22 PROCEDURE — 97165 OT EVAL LOW COMPLEX 30 MIN: CPT

## 2024-11-22 PROCEDURE — 94761 N-INVAS EAR/PLS OXIMETRY MLT: CPT

## 2024-11-22 PROCEDURE — 36415 COLL VENOUS BLD VENIPUNCTURE: CPT

## 2024-11-22 PROCEDURE — 80048 BASIC METABOLIC PNL TOTAL CA: CPT

## 2024-11-22 RX ORDER — CLOPIDOGREL BISULFATE 75 MG/1
75 TABLET ORAL DAILY
Status: DISCONTINUED | OUTPATIENT
Start: 2024-11-22 | End: 2024-11-22 | Stop reason: HOSPADM

## 2024-11-22 RX ORDER — FAMOTIDINE 20 MG/1
20 TABLET, FILM COATED ORAL 2 TIMES DAILY
Qty: 60 TABLET | Refills: 0 | Status: SHIPPED | OUTPATIENT
Start: 2024-11-22

## 2024-11-22 RX ORDER — LOSARTAN POTASSIUM 25 MG/1
25 TABLET ORAL DAILY
Status: DISCONTINUED | OUTPATIENT
Start: 2024-11-23 | End: 2024-11-22

## 2024-11-22 RX ORDER — CLOPIDOGREL BISULFATE 75 MG/1
75 TABLET ORAL DAILY
Qty: 30 TABLET | Refills: 3 | Status: SHIPPED | OUTPATIENT
Start: 2024-11-23

## 2024-11-22 RX ADMIN — AMLODIPINE BESYLATE 5 MG: 5 TABLET ORAL at 08:47

## 2024-11-22 RX ADMIN — SODIUM CHLORIDE, PRESERVATIVE FREE 10 ML: 5 INJECTION INTRAVENOUS at 11:06

## 2024-11-22 RX ADMIN — ASPIRIN 81 MG CHEWABLE TABLET 81 MG: 81 TABLET CHEWABLE at 08:47

## 2024-11-22 RX ADMIN — CLOPIDOGREL BISULFATE 75 MG: 75 TABLET ORAL at 08:47

## 2024-11-22 RX ADMIN — ENOXAPARIN SODIUM 40 MG: 100 INJECTION SUBCUTANEOUS at 08:47

## 2024-11-22 RX ADMIN — METOPROLOL SUCCINATE 50 MG: 50 TABLET, EXTENDED RELEASE ORAL at 08:47

## 2024-11-22 ASSESSMENT — PAIN SCALES - GENERAL
PAINLEVEL_OUTOF10: 0

## 2024-11-22 NOTE — PROGRESS NOTES
4 Eyes Skin Assessment     NAME:  Iman Samayoa  YOB: 1955  MEDICAL RECORD NUMBER:  [id number]    The patient is being assessed for  Shift Handoff    I agree that at least one RN has performed a thorough Head to Toe Skin Assessment on the patient. ALL assessment sites listed below have been assessed.      Areas assessed by both nurses:    Head, Face, Ears and Other ***Patient refused skin assessment of posterior         Does the Patient have a Wound? No noted wound(s)       Fede Prevention initiated by RN: No  Wound Care Orders initiated by RN: No    Pressure Injury (Stage 3,4, Unstageable, DTI, NWPT, and Complex wounds) if present, place Wound referral order by RN under : No    New Ostomies, if present place, Ostomy referral order under : No     Nurse 1 eSignature: Electronically signed by Lynne Arrington RN on 11/22/24 at 7:21 AM EST    **SHARE this note so that the co-signing nurse can place an eSignature**    Nurse 2 eSignature: {Esignature:[id number]}

## 2024-11-22 NOTE — PROGRESS NOTES
Cardiology Progress Note    Admit Date: 11/21/2024  Attending Cardiologist: Dr. King Jefferson     Assessment:     -CAD, PCI and BESSY of mLAD 11/14/24; PCI/BESSY of mid-distal RCA 11/21/24.   -Cardiomyopathy, Echo 10/2024 LVEF 40%, previously normal in 2019.    -HTN  -HLD   -Lymphedema     Primary Cardiologist, Dr. Nunn     Plan:       I saw, evaluated, interviewed and examined the patient personally.  No chest pain or chest tightness.  Hemodynamically stable  No rales or JVD.  Left groin small hematoma and bruise noted.  Good pulse  Patient since starting Brilinta has been complaining of some exertional dyspnea.  Would like to discontinue and like to try different medication.    Will discontinue Brilinta and will use Plavix instead  Aspirin lifelong and Plavix at least for a year  Continue rest of the cardiac medication as outlined below.    King Jefferson MD       -ASA, plavix for one year for RCA stent. Importance of compliance d/w patient at length. Intolerant to Brilinta due to s/e of dyspnea.   -Continue high intensity statin.   -GDMT, discontinue Amlodipine to optimize CMY medications.  Continue Toprol XL, resume Entresto at discharge. Further optimization per primary cardiologist at follow up appt.   -Patient stable for discharge from a cardiac standpoint.   -Patient to follow up with Dr. Nunn post discharge.     Subjective:     No new complaints.     Objective:      Patient Vitals for the past 8 hrs:   Temp Pulse Resp BP SpO2   11/22/24 0715 97.7 °F (36.5 °C) 67 18 123/70 97 %   11/22/24 0454 97.5 °F (36.4 °C) 60 16 120/75 97 %         Patient Vitals for the past 96 hrs:   Weight   11/21/24 1059 78 kg (172 lb)       TELE: normal sinus rhythm               Current Facility-Administered Medications   Medication Dose Route Frequency    0.9 % sodium chloride infusion   IntraVENous PRN    sodium chloride flush 0.9 % injection 5-40 mL  5-40 mL IntraVENous 2 times per day    sodium chloride flush 0.9 % injection 5-40  pulses palpable.     Visit Vitals  /70   Pulse 67   Temp 97.7 °F (36.5 °C) (Oral)   Resp 18   Ht 1.676 m (5' 6\")   Wt 78 kg (172 lb)   SpO2 97%   BMI 27.76 kg/m²       Data Review:     Labs: Results:       Chemistry Recent Labs     11/21/24 2101 11/22/24  0436    143   K 3.8 3.6   * 114*   CO2 25 22   BUN 20* 22*   GLOB 2.7  --       CBC w/Diff Recent Labs     11/21/24 2101 11/22/24  0436   WBC 11.9 8.7   RBC 4.00* 3.63*   HGB 12.9 11.7*   HCT 37.7 34.3*    255      Cardiac Enzymes No results found for: \"TROPHS\"   Coagulation No results for input(s): \"INR\", \"APTT\" in the last 72 hours.    Lipid Panel Lab Results   Component Value Date/Time    CHOL 347 02/18/2022 02:30 PM    HDL 51 02/18/2022 02:30 PM     02/18/2022 02:30 PM    VLDL 79 02/18/2022 02:30 PM      BNP No results found for: \"BNP\", \"BNPPOC\"    No results found for: \"BNP\"   Liver Enzymes Lab Results   Component Value Date    ALT 15 11/21/2024    AST 23 11/21/2024    ALKPHOS 58 11/21/2024    BILITOT 0.6 11/21/2024      Thyroid Studies No results found for: \"T4\", \"T3RU\", \"TSH\"       Signed By: Melissa Gutierrez PA-C     November 22, 2024

## 2024-11-22 NOTE — DISCHARGE SUMMARY
[facility]    Discharge Summary    Patient: Iman Samayoa MRN: [id number]  [facility]: [id number]    YOB: 1955  Age: 69 y.o.  Sex: female    DOA: 11/21/2024 LOS:  LOS: 1 day   Discharge Date:          Discharge Diagnoses:   CAD status post stent to RCA and recent stenting to the mLAD  Hypertension  GERD  Lymphedema  Hypothyroidism    Discharge Condition: Good    Discharge To: Home    Consults: Cardiology    HPI: per Admitting MD \" Iman Samayoa is a 69 y.o.  female with a pmh of CAD s/p recent stents, GERD, HTN, HLD, hypothyroidism who presents after a coronary angiogram and recent stent placement for observation.  Patient was noted to have a recent nuclear stress test in October 2024 with high risk finding anterior and inferior reversible defects present.  Patient was also noted to have a cardiomyopathy with an ejection fraction of 40%.  Patient received a left heart cath on 11/14/2024 and at that time received a drug-eluting stent to the mid LAD.  She returned today for a repeat PCI of the RCA lesion and she received a drug-eluting stent today as well.  Patient was noted to have residual chest pain, jaw pain and arm pain after her procedure and was instructed to stay in the hospital overnight for close observation.  Upon exam patient reports that her chest pain had mostly gone away and she had mild arm pain that was residual.     After the procedure the patient was noted to have a heart rate of 44, a respiratory rate of 16, was afebrile, blood pressure of 116/92 and the patient was 96% on 2 L of oxygen.  Labs are notable for chloride of 112, glucose 149, and albumin of 3.3.  Coronary angiogram reports were reviewed.  The patient was admitted for observation overnight.       Hospital Course:  69-year-old female past medical history of CAD s/p stent GERD hypertension hyperlipidemia hypothyroidism was admitted to medical service after initial cardiac  145 mmol/L    Potassium 3.6 3.5 - 5.5 mmol/L    Chloride 114 (H) 100 - 111 mmol/L    CO2 22 21 - 32 mmol/L    Anion Gap 7 3.0 - 18 mmol/L    Glucose 93 74 - 99 mg/dL    BUN 22 (H) 7.0 - 18 MG/DL    Creatinine 0.87 0.6 - 1.3 MG/DL    BUN/Creatinine Ratio 25 (H) 12 - 20      Est, Glom Filt Rate 72 >60 ml/min/1.73m2    Calcium 8.7 8.5 - 10.1 MG/DL   CBC with Auto Differential    Collection Time: 11/22/24  4:36 AM   Result Value Ref Range    WBC 8.7 4.6 - 13.2 K/uL    RBC 3.63 (L) 4.20 - 5.30 M/uL    Hemoglobin 11.7 (L) 12.0 - 16.0 g/dL    Hematocrit 34.3 (L) 35.0 - 45.0 %    MCV 94.5 78.0 - 100.0 FL    MCH 32.2 24.0 - 34.0 PG    MCHC 34.1 31.0 - 37.0 g/dL    RDW 12.0 11.6 - 14.5 %    Platelets 255 135 - 420 K/uL    MPV 10.8 9.2 - 11.8 FL    Nucleated RBCs 0.0 0  WBC    nRBC 0.00 0.00 - 0.01 K/uL    Neutrophils % 57 40 - 73 %    Lymphocytes % 30 21 - 52 %    Monocytes % 10 3 - 10 %    Eosinophils % 2 0 - 5 %    Basophils % 1 0 - 2 %    Immature Granulocytes % 0 0.0 - 0.5 %    Neutrophils Absolute 5.0 1.8 - 8.0 K/UL    Lymphocytes Absolute 2.6 0.9 - 3.6 K/UL    Monocytes Absolute 0.9 0.05 - 1.2 K/UL    Eosinophils Absolute 0.2 0.0 - 0.4 K/UL    Basophils Absolute 0.1 0.0 - 0.1 K/UL    Immature Granulocytes Absolute 0.0 0.00 - 0.04 K/UL    Differential Type AUTOMATED         Cardiac procedure    Result Date: 11/21/2024  Staged PCI of the RCA Successful balloon angioplasty and BESSY of the mRCA to dRCA 70% to 0% MARIBELL III to MARIBELL III flow utilizing 2.5x38mm BESSY postdilated with 2.5 and 3.0mm NC balloons Plan: Continue with aspirin 81mg po daily, plavix 75mg po daily, statin as can tolerate, bb as can tolerate, ACC provisions diet and exercise, cardiac rehab.      Cardiac procedure    Result Date: 11/15/2024  Coronary Artery Disease LM 0% focal stenosis mRCA 50% with distal portion 70-80% LCFX luminal irregularities with OM1 10-30% stenosis mLAD 70% stenosis partial ISR and involving distal to stent LVEDP 14mmHg mildly

## 2024-11-22 NOTE — CARE COORDINATION
11/22/24 1223   Service Assessment   Patient Orientation Alert and Oriented;Person;Place;Situation   Cognition Alert   History Provided By Patient   Primary Caregiver Self   Accompanied By/Relationship n/a   Support Systems Spouse/Significant Other   Patient's Healthcare Decision Maker is: Named in Scanned ACP Document   PCP Verified by CM Yes   Prior Functional Level Independent in ADLs/IADLs   Current Functional Level Independent in ADLs/IADLs   Can patient return to prior living arrangement No   Ability to make needs known: Good   Family able to assist with home care needs: Yes   Would you like for me to discuss the discharge plan with any other family members/significant others, and if so, who? No   Financial Resources Medicare   Social/Functional History   Lives With Spouse   Type of Home House   Home Layout One level   Home Access Stairs to enter with rails   Entrance Stairs - Number of Steps 5   Bathroom Shower/Tub Walk-in shower   Bathroom Equipment Shower chair   Home Equipment Cane;Walker - Rolling   ADL Assistance Independent   Homemaking Assistance Independent   Ambulation Assistance Independent   Transfer Assistance Independent   Discharge Planning   Type of Residence House   Living Arrangements Spouse/Significant Other   Current Services Prior To Admission None   Potential Assistance Needed N/A   Potential Assistance Purchasing Medications No   Type of Home Care Services None   Patient expects to be discharged to: House   Services At/After Discharge   Transition of Care Consult (CM Consult) N/A   Services At/After Discharge None   [place] Resource Information Provided? No   Mode of Transport at Discharge Other (see comment)  (family to transport)   Confirm Follow Up Transport Family   Condition of Participation: Discharge Planning   The Plan for Transition of Care is related to the following treatment goals: Home with support from spouse   The Patient and/or Patient Representative was provided with  a Choice of Provider? Patient   The Patient and/Or Patient Representative agree with the Discharge Plan? Yes   Freedom of Choice list was provided with basic dialogue that supports the patient's individualized plan of care/goals, treatment preferences, and shares the quality data associated with the providers?  No  (declined)     No needs identified at this time. Case management remains available as needed.

## 2024-11-22 NOTE — CARE COORDINATION
11/22/24 1222   IMM Letter   Observation Status Letter date given: 11/22/24   Observation Status Letter time given: 1204   Observation Status Letter given to Patient/Family/Significant other/Guardian/POA/by: Rocío Hamilton RN     Patient refused to sign medicare observation notice, copy left with patient and in bedside chart

## 2024-11-22 NOTE — PLAN OF CARE
bleeding. Patient also on brilinta and lovenox. Monitor labs.     Problem: Skin/Tissue Integrity  Goal: Absence of new skin breakdown  Description: 1.  Monitor for areas of redness and/or skin breakdown  2.  Assess vascular access sites hourly  3.  Every 4-6 hours minimum:  Change oxygen saturation probe site  4.  Every 4-6 hours:  If on nasal continuous positive airway pressure, respiratory therapy assess nares and determine need for appliance change or resting period.  11/22/2024 0245 by Lynne Arrington RN  Outcome: Progressing  Note: Patient with generalized ecchymosis due to use of blood thinners. Monitor left femoral site s/p heart cath. Dressing clean,dry and intact. Localized swelling and bruising to surrounding skin.

## 2024-11-22 NOTE — PROGRESS NOTES
SUMMARY:     Past Medical History:   Diagnosis Date    Asthma     Under control. last attack appxox. 12/2019    CAD (coronary artery disease) 1998    \"stent in LAD\"    Cancer (HCC)     squamous cell skin cancer on neck removed approx 2015    First degree AV block     Gastroesophageal reflux disease without esophagitis 6/3/2019    GERD (gastroesophageal reflux disease)     HTN (hypertension)     Hx vulvar dysplasia     Removed 08/2018    Hyperlipidemia     Hypothyroidism     no meds. under control    IC (interstitial cystitis)     Nausea & vomiting     Nausea    Pleural effusion 01/2019    Pneumonia 01/2019     Past Surgical History:   Procedure Laterality Date    APPENDECTOMY  1985    CARDIAC PROCEDURE N/A 11/14/2024    Coronary angiography performed by Randal Carias MD at [facility] CARDIAC CATH LAB    CARDIAC PROCEDURE N/A 11/14/2024    Insert stent [place] coronary performed by Randal Carias MD at [facility] CARDIAC CATH LAB    CARDIAC PROCEDURE N/A 11/14/2024    Intravascular ultrasound performed by Randal Carias MD at [facility] CARDIAC CATH LAB    CARDIAC PROCEDURE N/A 11/14/2024    Percutaneous coronary intervention performed by Randal Carias MD at [facility] CARDIAC CATH LAB    CARDIAC PROCEDURE N/A 11/21/2024    Insert stent [place] coronary performed by Randal Carias MD at [facility] CARDIAC CATH LAB    CARDIAC PROCEDURE N/A 11/21/2024    Left heart cath / coronary angiography performed by Randal Carias MD at [facility] CARDIAC CATH LAB    CARDIAC PROCEDURE N/A 11/21/2024    Intravascular ultrasound performed by Randal Carias MD at [facility] CARDIAC CATH LAB    CHOLECYSTECTOMY  1982    CORONARY ANGIOPLASTY WITH STENT PLACEMENT  2007    CORONARY ANGIOPLASTY WITH STENT PLACEMENT      HYSTERECTOMY (CERVIX STATUS UNKNOWN)  2015    Complete    LITHOTRIPSY  03/2021    FL UNLISTED PROCEDURE CARDIAC SURGERY  2016 & 2018    TUBAL LIGATION Bilateral 1981       Home Situation:   Social/Functional History  Lives With: Spouse  Type of Home: House  Home  Layout: One level  Home Access: Stairs to enter with rails  Entrance Stairs - Number of Steps: 5  Bathroom Shower/Tub: Walk-in shower  Bathroom Equipment: Shower chair  Home Equipment: Cane, Walker - Rolling  ADL Assistance: Independent  Homemaking Assistance: Independent  Ambulation Assistance: Independent  Transfer Assistance: Independent  []  Right hand dominant   []  Left hand dominant    Cognitive/Behavioral Status:  Orientation  Overall Orientation Status: Within Functional Limits  Orientation Level: Oriented X4  Cognition  Overall Cognitive Status: WFL    Skin: visible skin intact  Edema: none noted    Vision/Perceptual:    Vision  Vision: Within Functional Limits       Coordination: BUE  Coordination: Within functional limits       Balance:     Balance  Sitting: Intact  Standing: Impaired;Without support  Standing - Static: Good  Standing - Dynamic: Good    Strength: BUE  Strength: Within functional limits    Tone & Sensation: BUE  Tone: Normal  Sensation: Intact    Range of Motion: BUE  AROM: Within functional limits       Functional Mobility and Transfers for ADLs:  Bed Mobility:     Bed Mobility Training  Bed Mobility Training: Yes  Rolling: Modified independent  Supine to Sit: Modified independent  Sit to Supine: Modified independent  Scooting: Modified independent  Transfers:   Transfer Training  Transfer Training: Yes  Sit to Stand: Modified independent  Stand to Sit: Modified independent  Toilet Transfer: Modified independent    ADL Assessment:   Feeding: Independent  Grooming: Independent  UE Bathing: Independent  LE Bathing: Modified independent   UE Dressing: Independent  LE Dressing: Modified independent   Toileting: Modified independent      Pain:  Intensity Pre-treatment: 0/10   Intensity Post-treatment: 0/10  Scale: Numeric Rating Scale    Activity Tolerance:   Activity Tolerance: Patient Tolerated treatment well  Please refer to the flowsheet for vital signs taken during this

## 2024-11-22 NOTE — PROGRESS NOTES
Dressing to left femoral changed. Dressing moderately saturated with blood. New gauze and Tegaderm applied. Site assessed and is ecchymotic. Localized swelling but no hematoma palpated. Pulse is palpable. Scant amount of sanguineous drainage coming from site. Pressure dressing applied. Patient is ambulatory but was encouraged to not be up out of of bed as frequent. Will continue to monitor site.     2330 Dressing remains clean,dry and intact. Bleeding has subsided. Surrounding area is ecchymotic. No hematoma, localized swelling.       0215 Dressing to left femoral clean,dry and intact. Surrounding area is ecchymotic. No hematoma, localized swelling.

## 2024-11-22 NOTE — H&P
History & Physical    Patient: Iman Samayoa MRN: [id number]  [facility]: [id number]    YOB: 1955  Age: 69 y.o.  Sex: female      DOA: 11/21/2024    Chief Complaint: s/p cardiac cath monitoring       HPI:     Iman Samayoa is a 69 y.o.  female with a pmh of CAD s/p recent stents, GERD, HTN, HLD, hypothyroidism who presents after a coronary angiogram and recent stent placement for observation.  Patient was noted to have a recent nuclear stress test in October 2024 with high risk finding anterior and inferior reversible defects present.  Patient was also noted to have a cardiomyopathy with an ejection fraction of 40%.  Patient received a left heart cath on 11/14/2024 and at that time received a drug-eluting stent to the mid LAD.  She returned today for a repeat PCI of the RCA lesion and she received a drug-eluting stent today as well.  Patient was noted to have residual chest pain, jaw pain and arm pain after her procedure and was instructed to stay in the hospital overnight for close observation.  Upon exam patient reports that her chest pain had mostly gone away and she had mild arm pain that was residual.    After the procedure the patient was noted to have a heart rate of 44, a respiratory rate of 16, was afebrile, blood pressure of 116/92 and the patient was 96% on 2 L of oxygen.  Labs are notable for chloride of 112, glucose 149, and albumin of 3.3.  Coronary angiogram reports were reviewed.  The patient was admitted for observation overnight.    Past Medical History:   Diagnosis Date    Asthma     Under control. last attack appxox. 12/2019    CAD (coronary artery disease) 1998    \"stent in LAD\"    Cancer (HCC)     squamous cell skin cancer on neck removed approx 2015    First degree AV block     Gastroesophageal reflux disease without esophagitis 6/3/2019    GERD (gastroesophageal reflux disease)     HTN (hypertension)     Hx vulvar dysplasia     Removed 08/2018    Hyperlipidemia

## 2024-11-22 NOTE — PROGRESS NOTES
PT orders received and chart was reviewed.  Pt was seen for screen only as she reports she is transferring and ambulating independently in her room and declines ambulation in the hallway.  Will discontinue current order as patient has no needs at this time.  Jojo Ferrer, PT

## 2024-11-22 NOTE — PROGRESS NOTES
Verbal and written discharge instructions given to patient. Questions asked and answered. No further needs identified.

## 2024-11-22 NOTE — DISCHARGE INSTRUCTIONS
DISCHARGE SUMMARY from Nurse    PATIENT INSTRUCTIONS:  Report the following to your primary care physician or return to Emergency Department:  Excessive pain, swelling, redness or odor of or around the surgical area  Temperature over 100.5  Nausea and vomiting lasting longer than 4 hours or if unable to take medications  Any signs of decreased circulation or nerve impairment to extremity: change in color, persistent  numbness, tingling, coldness or increase pain  Any questions    What to do at Home:  Recommended activity: activity as tolerated    If you experience any of the following symptoms elevated temperature, increased chest pain please follow up with Primary care physician or return to Emergency Dept.    *  Please give a list of your current medications to your Primary Care Provider.    *  Please update this list whenever your medications are discontinued, doses are      changed, or new medications (including over-the-counter products) are added.    *  Please carry medication information at all times in case of emergency situations.    These are general instructions for a healthy lifestyle:    No smoking/ No tobacco products/ Avoid exposure to second hand smoke  Surgeon General's Warning:  Quitting smoking now greatly reduces serious risk to your health.    Obesity, smoking, and sedentary lifestyle greatly increases your risk for illness    A healthy diet, regular physical exercise & weight monitoring are important for maintaining a healthy lifestyle    You may be retaining fluid if you have a history of heart failure or if you experience any of the following symptoms:  Weight gain of 3 pounds or more overnight or 5 pounds in a week, increased swelling in our hands or feet or shortness of breath while lying flat in bed.  Please call your doctor as soon as you notice any of these symptoms; do not wait until your next office visit.        The discharge information has been reviewed with the patient.  The

## 2024-11-22 NOTE — PLAN OF CARE
Problem: Pain  Goal: Verbalizes/displays adequate comfort level or baseline comfort level  11/22/2024 1220 by Citnhia Moeller RN  Outcome: Progressing  11/22/2024 0245 by Lynne Arrington RN  Outcome: Progressing  Flowsheets (Taken 11/22/2024 0245)  Verbalizes/displays adequate comfort level or baseline comfort level: Assess pain using appropriate pain scale  Note: Monitor for pain. Patient denies chest pain at this time. Mild tenderness to left femoral site when palpated Patient s/p heart cath.  11/22/2024 0245 by Lynne Arrington RN  Outcome: Progressing  Flowsheets (Taken 11/22/2024 0245)  Verbalizes/displays adequate comfort level or baseline comfort level: Assess pain using appropriate pain scale     Problem: Discharge Planning  Goal: Discharge to home or other facility with appropriate resources  11/22/2024 1220 by Cinthia Moeller RN  Outcome: Progressing  11/22/2024 0245 by Lynne Arrintgon RN  Outcome: Progressing  Flowsheets (Taken 11/22/2024 0245)  Discharge to home or other facility with appropriate resources: Identify barriers to discharge with patient and caregiver  Note: Possible discharge this AM. Pending clearance from cardiology.  11/22/2024 0245 by Lynne Arrington RN  Outcome: Progressing  Flowsheets  Taken 11/22/2024 0245  Discharge to home or other facility with appropriate resources: Identify barriers to discharge with patient and caregiver  Taken 11/21/2024 2000  Discharge to home or other facility with appropriate resources: Identify barriers to discharge with patient and caregiver     Problem: Cardiovascular - Adult  Goal: Maintains optimal cardiac output and hemodynamic stability  11/22/2024 1220 by Cinthia Moeller RN  Outcome: Progressing  11/22/2024 0245 by Lynne Arrington RN  Outcome: Progressing  Flowsheets (Taken 11/22/2024 0245)  Maintains optimal cardiac output and hemodynamic stability:   Monitor blood pressure and heart rate   Assess for signs of decreased cardiac  to surrounding skin.  11/22/2024 0245 by Lynne Arrington, RN  Outcome: Progressing

## 2024-11-22 NOTE — PROGRESS NOTES
Dressing to left femoral changed. Dressing moderately saturated with blood. New gauze and Tegaderm applied. Site assessed and is ecchymotic. Localized swelling but no hematoma palpated. Pulse is palpable. Scant amount of sanguineous drainage coming from site. Pressure dressing applied. Patient is ambulatory but was encouraged  to not be up out of of bed as frequent. Will continue to monitor site.

## 2024-11-23 PROBLEM — I42.8 NON-ISCHEMIC CARDIOMYOPATHY (HCC): Status: ACTIVE | Noted: 2024-11-23

## 2024-11-24 PROBLEM — Z01.810 PREOP CARDIOVASCULAR EXAM: Status: RESOLVED | Noted: 2024-10-25 | Resolved: 2024-11-24

## 2025-01-23 ENCOUNTER — HOSPITAL ENCOUNTER (OUTPATIENT)
Facility: HOSPITAL | Age: 70
Discharge: HOME | End: 2025-01-26
Payer: MEDICARE

## 2025-01-23 DIAGNOSIS — E78.2 MIXED HYPERLIPIDEMIA: ICD-10-CM

## 2025-01-23 LAB
ALBUMIN SERPL-MCNC: 3.5 G/DL (ref 3.4–5)
ALBUMIN/GLOB SERPL: 1.3 (ref 0.8–1.7)
ALP SERPL-CCNC: 67 U/L (ref 45–117)
ALT SERPL-CCNC: 15 U/L (ref 13–56)
AST SERPL-CCNC: 16 U/L (ref 10–38)
BILIRUB DIRECT SERPL-MCNC: 0.2 MG/DL (ref 0–0.2)
BILIRUB SERPL-MCNC: 0.6 MG/DL (ref 0.2–1)
CHOLEST SERPL-MCNC: 153 MG/DL
GLOBULIN SER CALC-MCNC: 2.8 G/DL (ref 2–4)
HDLC SERPL-MCNC: 54 MG/DL (ref 40–60)
HDLC SERPL: 2.8 (ref 0–5)
LDLC SERPL CALC-MCNC: 57 MG/DL (ref 0–100)
LIPID PANEL: ABNORMAL
PROT SERPL-MCNC: 6.3 G/DL (ref 6.4–8.2)
TRIGL SERPL-MCNC: 210 MG/DL
VLDLC SERPL CALC-MCNC: 42 MG/DL

## 2025-01-23 PROCEDURE — 80061 LIPID PANEL: CPT

## 2025-01-23 PROCEDURE — 36415 COLL VENOUS BLD VENIPUNCTURE: CPT

## 2025-01-23 PROCEDURE — 80076 HEPATIC FUNCTION PANEL: CPT

## 2025-01-24 ENCOUNTER — TELEPHONE (OUTPATIENT)
Age: 70
End: 2025-01-24

## 2025-01-24 DIAGNOSIS — K92.1 BLOOD IN STOOL: Primary | ICD-10-CM

## 2025-01-24 LAB
FAX TO NUMBER: NORMAL
TEST RESULTS FAXED TO: NORMAL

## 2025-01-24 NOTE — TELEPHONE ENCOUNTER
----- Message from Dr. Bon Nunn MD sent at 1/24/2025  1:06 PM EST -----  Please contact patient and do the following asap    Let her know that lipids are significantly improved.  Please continue to try to lose weight few more pounds.  Please reinforce diet and exercise.

## 2025-01-24 NOTE — RESULT ENCOUNTER NOTE
Please contact patient and do the following asap    Let her know that lipids are significantly improved.  Please continue to try to lose weight few more pounds.  Please reinforce diet and exercise.

## 2025-01-24 NOTE — PROGRESS NOTES
Spoke to patient per Dr. Nunn to get CBC done and if you feel bad go to ER. She voices understanding and acceptance of this advice and will call back if any further questions or concerns.

## 2025-01-24 NOTE — TELEPHONE ENCOUNTER
Patient was called and informed Per Dr. Nunn ipids are significantly improved. Please continue to try to lose weight few more pounds. Please reinforce diet and exercise.

## 2025-01-27 ENCOUNTER — HOSPITAL ENCOUNTER (OUTPATIENT)
Facility: HOSPITAL | Age: 70
Discharge: HOME | End: 2025-01-30
Payer: MEDICARE

## 2025-01-27 DIAGNOSIS — K92.1 BLOOD IN STOOL: ICD-10-CM

## 2025-01-27 LAB
ERYTHROCYTE [DISTWIDTH] IN BLOOD BY AUTOMATED COUNT: 12.1 % (ref 11.6–14.5)
HCT VFR BLD AUTO: 38 % (ref 35–45)
HGB BLD-MCNC: 13 G/DL (ref 12–16)
MCH RBC QN AUTO: 32.2 PG (ref 24–34)
MCHC RBC AUTO-ENTMCNC: 34.2 G/DL (ref 31–37)
MCV RBC AUTO: 94.1 FL (ref 78–100)
NRBC # BLD: 0 K/UL (ref 0–0.01)
NRBC BLD-RTO: 0 PER 100 WBC
PLATELET # BLD AUTO: 242 K/UL (ref 135–420)
PMV BLD AUTO: 10.8 FL (ref 9.2–11.8)
RBC # BLD AUTO: 4.04 M/UL (ref 4.2–5.3)
WBC # BLD AUTO: 8.9 K/UL (ref 4.6–13.2)

## 2025-01-27 PROCEDURE — 85027 COMPLETE CBC AUTOMATED: CPT

## 2025-01-27 PROCEDURE — 36415 COLL VENOUS BLD VENIPUNCTURE: CPT

## 2025-01-28 ENCOUNTER — OFFICE VISIT (OUTPATIENT)
Age: 70
End: 2025-01-28
Payer: MEDICARE

## 2025-01-28 VITALS
HEIGHT: 66 IN | WEIGHT: 178 LBS | HEART RATE: 62 BPM | SYSTOLIC BLOOD PRESSURE: 137 MMHG | BODY MASS INDEX: 28.61 KG/M2 | DIASTOLIC BLOOD PRESSURE: 61 MMHG

## 2025-01-28 DIAGNOSIS — I25.708 CORONARY ARTERY DISEASE OF BYPASS GRAFT OF NATIVE HEART WITH STABLE ANGINA PECTORIS (HCC): Primary | ICD-10-CM

## 2025-01-28 DIAGNOSIS — Z95.5 HISTORY OF CORONARY ARTERY STENT PLACEMENT: ICD-10-CM

## 2025-01-28 DIAGNOSIS — E78.2 MIXED HYPERLIPIDEMIA: ICD-10-CM

## 2025-01-28 DIAGNOSIS — I25.5 ISCHEMIC CARDIOMYOPATHY: ICD-10-CM

## 2025-01-28 DIAGNOSIS — I10 PRIMARY HYPERTENSION: ICD-10-CM

## 2025-01-28 DIAGNOSIS — I44.7 LBBB (LEFT BUNDLE BRANCH BLOCK): ICD-10-CM

## 2025-01-28 LAB
FAX TO NUMBER: NORMAL
TEST RESULTS FAXED TO: NORMAL

## 2025-01-28 PROCEDURE — G8427 DOCREV CUR MEDS BY ELIG CLIN: HCPCS | Performed by: INTERNAL MEDICINE

## 2025-01-28 PROCEDURE — 1126F AMNT PAIN NOTED NONE PRSNT: CPT | Performed by: INTERNAL MEDICINE

## 2025-01-28 PROCEDURE — 3078F DIAST BP <80 MM HG: CPT | Performed by: INTERNAL MEDICINE

## 2025-01-28 PROCEDURE — 3075F SYST BP GE 130 - 139MM HG: CPT | Performed by: INTERNAL MEDICINE

## 2025-01-28 PROCEDURE — 1160F RVW MEDS BY RX/DR IN RCRD: CPT | Performed by: INTERNAL MEDICINE

## 2025-01-28 PROCEDURE — G8400 PT W/DXA NO RESULTS DOC: HCPCS | Performed by: INTERNAL MEDICINE

## 2025-01-28 PROCEDURE — 1123F ACP DISCUSS/DSCN MKR DOCD: CPT | Performed by: INTERNAL MEDICINE

## 2025-01-28 PROCEDURE — 99214 OFFICE O/P EST MOD 30 MIN: CPT | Performed by: INTERNAL MEDICINE

## 2025-01-28 PROCEDURE — 1090F PRES/ABSN URINE INCON ASSESS: CPT | Performed by: INTERNAL MEDICINE

## 2025-01-28 PROCEDURE — 1036F TOBACCO NON-USER: CPT | Performed by: INTERNAL MEDICINE

## 2025-01-28 PROCEDURE — G8419 CALC BMI OUT NRM PARAM NOF/U: HCPCS | Performed by: INTERNAL MEDICINE

## 2025-01-28 PROCEDURE — 3017F COLORECTAL CA SCREEN DOC REV: CPT | Performed by: INTERNAL MEDICINE

## 2025-01-28 PROCEDURE — 1159F MED LIST DOCD IN RCRD: CPT | Performed by: INTERNAL MEDICINE

## 2025-01-28 RX ORDER — CLOPIDOGREL BISULFATE 75 MG/1
75 TABLET ORAL DAILY
Qty: 90 TABLET | Refills: 3 | Status: SHIPPED | OUTPATIENT
Start: 2025-01-28

## 2025-01-28 RX ORDER — ONDANSETRON 4 MG/1
4 TABLET, FILM COATED ORAL EVERY 8 HOURS PRN
COMMUNITY

## 2025-01-28 RX ORDER — FENOFIBRATE 48 MG/1
48 TABLET, COATED ORAL DAILY
Qty: 30 TABLET | Refills: 3 | Status: SHIPPED | OUTPATIENT
Start: 2025-01-28

## 2025-01-28 RX ORDER — CARVEDILOL 12.5 MG/1
12.5 TABLET ORAL 2 TIMES DAILY
Qty: 180 TABLET | Refills: 3 | Status: SHIPPED | OUTPATIENT
Start: 2025-01-28

## 2025-01-28 RX ORDER — ATORVASTATIN CALCIUM 40 MG/1
80 TABLET, FILM COATED ORAL NIGHTLY
Qty: 90 TABLET | Refills: 3 | Status: SHIPPED | OUTPATIENT
Start: 2025-01-28

## 2025-01-28 ASSESSMENT — PATIENT HEALTH QUESTIONNAIRE - PHQ9
1. LITTLE INTEREST OR PLEASURE IN DOING THINGS: NOT AT ALL
SUM OF ALL RESPONSES TO PHQ QUESTIONS 1-9: 0
SUM OF ALL RESPONSES TO PHQ9 QUESTIONS 1 & 2: 0
2. FEELING DOWN, DEPRESSED OR HOPELESS: NOT AT ALL
SUM OF ALL RESPONSES TO PHQ QUESTIONS 1-9: 0
SUM OF ALL RESPONSES TO PHQ QUESTIONS 1-9: 0
DEPRESSION UNABLE TO ASSESS: FUNCTIONAL CAPACITY MOTIVATION LIMITS ACCURACY
SUM OF ALL RESPONSES TO PHQ QUESTIONS 1-9: 0

## 2025-01-28 ASSESSMENT — ENCOUNTER SYMPTOMS
EYES NEGATIVE: 1
BACK PAIN: 1
RESPIRATORY NEGATIVE: 1
GASTROINTESTINAL NEGATIVE: 1

## 2025-01-28 NOTE — PROGRESS NOTES
1. Have you been to the ER, urgent care clinic since your last visit?  Hospitalized since your last visit?     No      2.  Where do you normally have your labs drawn?   [facility]    3. Do you need any refills today?   No    4. Which local pharmacy do you use (enter pharmacy)?   [facility]    5. Which mail order pharmacy do you use (enter pharmacy)?   No     6. Are you here for surgical clearance and if so who will be doing your     procedure/surgery (care team)?   No      
possible.  Diet plan: Mediterranean diet guidelines explained and printed.    Iman was seen today for follow-up.    Diagnoses and all orders for this visit:    Coronary artery disease of bypass graft of native heart with stable angina pectoris (HCC)    Primary hypertension    Ischemic cardiomyopathy  -     carvedilol (COREG) 12.5 MG tablet; Take 1 tablet by mouth 2 times daily  -     empagliflozin (JARDIANCE) 10 MG tablet; Take 1 tablet by mouth daily    History of coronary artery stent placement  -     clopidogrel (PLAVIX) 75 MG tablet; Take 1 tablet by mouth daily    LBBB (left bundle branch block)    Mixed hyperlipidemia  -     Hepatic Function Panel; Future  -     Lipid Panel; Future  -     atorvastatin (LIPITOR) 40 MG tablet; Take 2 tablets by mouth nightly  -     fenofibrate (TRICOR) 48 MG tablet; Take 1 tablet by mouth daily          See patient instructions also for other medical advice given    Medications Discontinued During This Encounter   Medication Reason    famotidine (PEPCID) 20 MG tablet LIST CLEANUP    clopidogrel (PLAVIX) 75 MG tablet LIST CLEANUP    metoprolol succinate (TOPROL XL) 50 MG extended release tablet Alternate therapy    atorvastatin (LIPITOR) 80 MG tablet REORDER       Follow-up and Dispositions    Return in about 3 months (around 4/28/2025), or if symptoms worsen or fail to improve, for post test/procedure.           Return to ER if any significant CP not relieved by s/l NTG, severe SOB, severe palpitations, loss of consciousness    1/28/2025  Plan for medicines : Lipids show excellent reduction of LDL but triglycerides still elevated.  Will reduce Lipitor to 40 mg a day and add fenofibrate 48 mg a day.  Check labs in 6 weeks.  Recommended weight loss also that she will try.  CAD clinically stable but seems to have ischemic cardiomyopathy.  Will follow-up on GDMT and repeat echo in future.  Since hemoglobin stable and had recent BESSY, continue aspirin and Plavix and watch for

## 2025-03-19 ENCOUNTER — TELEPHONE (OUTPATIENT)
Age: 70
End: 2025-03-19

## 2025-03-19 NOTE — TELEPHONE ENCOUNTER
Patient states that you told her to call you back if she wanted to switch for Farxiga to Ozempic and she states she would like to take then Ozempic. Please advise

## 2025-03-19 NOTE — TELEPHONE ENCOUNTER
Discussed with patient myself on phone.  I think she should continue Jardiance as Wegovy will not be approved because her BMI is less than 30.

## 2025-05-25 DIAGNOSIS — E78.2 MIXED HYPERLIPIDEMIA: ICD-10-CM

## 2025-05-27 RX ORDER — SACUBITRIL AND VALSARTAN 24; 26 MG/1; MG/1
1 TABLET, FILM COATED ORAL 2 TIMES DAILY
Qty: 60 TABLET | Refills: 3 | OUTPATIENT
Start: 2025-05-27

## 2025-05-27 RX ORDER — FENOFIBRATE 48 MG/1
48 TABLET, FILM COATED ORAL DAILY
Qty: 30 TABLET | Refills: 3 | OUTPATIENT
Start: 2025-05-27

## 2025-06-04 ENCOUNTER — TELEPHONE (OUTPATIENT)
Age: 70
End: 2025-06-04

## 2025-06-04 NOTE — TELEPHONE ENCOUNTER
Received a call from patient that she will need back surgery and it cannot wait any longer as she is hurting too much.  It is becoming difficult to walk.  She has to hold onto the kendall.  She had LAD and then RCA stents done in November 2024 and has been on dual antiplatelets.  For spinal surgery, if both of these agents need to be held, there will be a moderate risk for stent thrombosis resulting in acute MI.  I discussed with patient in detail regarding this and recommended that in case we hold both of these agents, we will use subcu Lovenox until 12 hours prior to surgery starting 48 hours after Plavix is held.  This will be given for about 3 days.  If she should develop any chest discomfort or significant shortness of breath during this.  Of holding dual antiplatelets, she should take aspirin and Plavix immediately at home and come to emergency room.  Surgery will be canceled at that time.

## 2025-08-08 ENCOUNTER — HOSPITAL ENCOUNTER (OUTPATIENT)
Facility: HOSPITAL | Age: 70
Discharge: HOME | End: 2025-08-11
Payer: MEDICARE

## 2025-08-08 ENCOUNTER — TRANSCRIBE ORDERS (OUTPATIENT)
Facility: HOSPITAL | Age: 70
End: 2025-08-08

## 2025-08-08 DIAGNOSIS — Z01.812 BLOOD TESTS PRIOR TO TREATMENT OR PROCEDURE: ICD-10-CM

## 2025-08-08 DIAGNOSIS — Z01.812 BLOOD TESTS PRIOR TO TREATMENT OR PROCEDURE: Primary | ICD-10-CM

## 2025-08-08 LAB
ALBUMIN SERPL-MCNC: 3.5 G/DL (ref 3.4–5)
ALBUMIN/GLOB SERPL: 1.2 (ref 0.8–1.7)
ALP SERPL-CCNC: 101 U/L (ref 45–117)
ALT SERPL-CCNC: 10 U/L (ref 10–35)
ANION GAP SERPL CALC-SCNC: 9 MMOL/L (ref 3–18)
AST SERPL-CCNC: 18 U/L (ref 10–38)
BILIRUB SERPL-MCNC: 0.4 MG/DL (ref 0.2–1)
BUN SERPL-MCNC: 15 MG/DL (ref 6–23)
BUN/CREAT SERPL: 16 (ref 12–20)
CALCIUM SERPL-MCNC: 10.1 MG/DL (ref 8.5–10.1)
CHLORIDE SERPL-SCNC: 107 MMOL/L (ref 98–107)
CO2 SERPL-SCNC: 28 MMOL/L (ref 21–32)
CREAT SERPL-MCNC: 0.9 MG/DL (ref 0.6–1.3)
ERYTHROCYTE [DISTWIDTH] IN BLOOD BY AUTOMATED COUNT: 12 % (ref 11.6–14.5)
GLOBULIN SER CALC-MCNC: 2.9 G/DL (ref 2–4)
GLUCOSE SERPL-MCNC: 109 MG/DL (ref 74–108)
HCT VFR BLD AUTO: 42.6 % (ref 35–45)
HGB BLD-MCNC: 14.1 G/DL (ref 12–16)
MCH RBC QN AUTO: 31.3 PG (ref 24–34)
MCHC RBC AUTO-ENTMCNC: 33.1 G/DL (ref 31–37)
MCV RBC AUTO: 94.5 FL (ref 78–100)
NRBC # BLD: 0 K/UL (ref 0–0.01)
NRBC BLD-RTO: 0 PER 100 WBC
PLATELET # BLD AUTO: 265 K/UL (ref 135–420)
PMV BLD AUTO: 11 FL (ref 9.2–11.8)
POTASSIUM SERPL-SCNC: 5 MMOL/L (ref 3.5–5.5)
PROT SERPL-MCNC: 6.5 G/DL (ref 6.4–8.2)
RBC # BLD AUTO: 4.51 M/UL (ref 4.2–5.3)
SODIUM SERPL-SCNC: 143 MMOL/L (ref 136–145)
WBC # BLD AUTO: 8.5 K/UL (ref 4.6–13.2)

## 2025-08-08 PROCEDURE — 85027 COMPLETE CBC AUTOMATED: CPT

## 2025-08-08 PROCEDURE — 36415 COLL VENOUS BLD VENIPUNCTURE: CPT

## 2025-08-08 PROCEDURE — 80053 COMPREHEN METABOLIC PANEL: CPT

## 2025-08-11 ENCOUNTER — ANESTHESIA EVENT (OUTPATIENT)
Facility: HOSPITAL | Age: 70
End: 2025-08-11
Payer: MEDICARE

## 2025-08-11 ENCOUNTER — APPOINTMENT (OUTPATIENT)
Facility: HOSPITAL | Age: 70
DRG: 451 | End: 2025-08-11
Attending: ORTHOPAEDIC SURGERY
Payer: MEDICARE

## 2025-08-11 ENCOUNTER — ANESTHESIA (OUTPATIENT)
Facility: HOSPITAL | Age: 70
End: 2025-08-11
Payer: MEDICARE

## 2025-08-11 ENCOUNTER — HOSPITAL ENCOUNTER (INPATIENT)
Facility: HOSPITAL | Age: 70
LOS: 3 days | Discharge: NURSING, REHAB OR LONG-TERM CARE | DRG: 451 | End: 2025-08-16
Attending: ORTHOPAEDIC SURGERY | Admitting: ORTHOPAEDIC SURGERY
Payer: MEDICARE

## 2025-08-11 DIAGNOSIS — Z98.1 S/P LUMBAR SPINAL FUSION: Primary | ICD-10-CM

## 2025-08-11 PROBLEM — M43.16 SPONDYLOLISTHESIS AT L4-L5 LEVEL: Status: ACTIVE | Noted: 2025-08-11

## 2025-08-11 LAB
EKG ATRIAL RATE: 61 BPM
EKG DIAGNOSIS: NORMAL
EKG P AXIS: 48 DEGREES
EKG P-R INTERVAL: 202 MS
EKG Q-T INTERVAL: 472 MS
EKG QRS DURATION: 162 MS
EKG QTC CALCULATION (BAZETT): 475 MS
EKG R AXIS: -54 DEGREES
EKG T AXIS: 116 DEGREES
EKG VENTRICULAR RATE: 61 BPM

## 2025-08-11 PROCEDURE — G0378 HOSPITAL OBSERVATION PER HR: HCPCS

## 2025-08-11 PROCEDURE — 3700000000 HC ANESTHESIA ATTENDED CARE: Performed by: ORTHOPAEDIC SURGERY

## 2025-08-11 PROCEDURE — C1889 IMPLANT/INSERT DEVICE, NOC: HCPCS | Performed by: ORTHOPAEDIC SURGERY

## 2025-08-11 PROCEDURE — C1729 CATH, DRAINAGE: HCPCS | Performed by: ORTHOPAEDIC SURGERY

## 2025-08-11 PROCEDURE — 93010 ELECTROCARDIOGRAM REPORT: CPT | Performed by: INTERNAL MEDICINE

## 2025-08-11 PROCEDURE — 2500000003 HC RX 250 WO HCPCS: Performed by: NURSE ANESTHETIST, CERTIFIED REGISTERED

## 2025-08-11 PROCEDURE — 93005 ELECTROCARDIOGRAM TRACING: CPT | Performed by: PHYSICIAN ASSISTANT

## 2025-08-11 PROCEDURE — 6360000002 HC RX W HCPCS: Performed by: ANESTHESIOLOGY

## 2025-08-11 PROCEDURE — 0ST20ZZ RESECTION OF LUMBAR VERTEBRAL DISC, OPEN APPROACH: ICD-10-PCS | Performed by: ORTHOPAEDIC SURGERY

## 2025-08-11 PROCEDURE — 3600000004 HC SURGERY LEVEL 4 BASE: Performed by: ORTHOPAEDIC SURGERY

## 2025-08-11 PROCEDURE — 2580000003 HC RX 258: Performed by: ORTHOPAEDIC SURGERY

## 2025-08-11 PROCEDURE — 3600000014 HC SURGERY LEVEL 4 ADDTL 15MIN: Performed by: ORTHOPAEDIC SURGERY

## 2025-08-11 PROCEDURE — 6370000000 HC RX 637 (ALT 250 FOR IP): Performed by: ORTHOPAEDIC SURGERY

## 2025-08-11 PROCEDURE — 2720000010 HC SURG SUPPLY STERILE: Performed by: ORTHOPAEDIC SURGERY

## 2025-08-11 PROCEDURE — 7100000000 HC PACU RECOVERY - FIRST 15 MIN: Performed by: ORTHOPAEDIC SURGERY

## 2025-08-11 PROCEDURE — 6360000002 HC RX W HCPCS: Performed by: ORTHOPAEDIC SURGERY

## 2025-08-11 PROCEDURE — 2500000003 HC RX 250 WO HCPCS: Performed by: ORTHOPAEDIC SURGERY

## 2025-08-11 PROCEDURE — 2709999900 HC NON-CHARGEABLE SUPPLY: Performed by: ORTHOPAEDIC SURGERY

## 2025-08-11 PROCEDURE — C1713 ANCHOR/SCREW BN/BN,TIS/BN: HCPCS | Performed by: ORTHOPAEDIC SURGERY

## 2025-08-11 PROCEDURE — 0SG00K1 FUSION OF LUMBAR VERTEBRAL JOINT WITH NONAUTOLOGOUS TISSUE SUBSTITUTE, POSTERIOR APPROACH, POSTERIOR COLUMN, OPEN APPROACH: ICD-10-PCS | Performed by: ORTHOPAEDIC SURGERY

## 2025-08-11 PROCEDURE — 3700000001 HC ADD 15 MINUTES (ANESTHESIA): Performed by: ORTHOPAEDIC SURGERY

## 2025-08-11 PROCEDURE — 2580000003 HC RX 258: Performed by: ANESTHESIOLOGY

## 2025-08-11 PROCEDURE — 6370000000 HC RX 637 (ALT 250 FOR IP): Performed by: ANESTHESIOLOGY

## 2025-08-11 PROCEDURE — 7100000001 HC PACU RECOVERY - ADDTL 15 MIN: Performed by: ORTHOPAEDIC SURGERY

## 2025-08-11 PROCEDURE — 2780000010 HC IMPLANT OTHER: Performed by: ORTHOPAEDIC SURGERY

## 2025-08-11 PROCEDURE — 99222 1ST HOSP IP/OBS MODERATE 55: CPT | Performed by: INTERNAL MEDICINE

## 2025-08-11 PROCEDURE — 6360000002 HC RX W HCPCS: Performed by: NURSE ANESTHETIST, CERTIFIED REGISTERED

## 2025-08-11 PROCEDURE — 51702 INSERT TEMP BLADDER CATH: CPT

## 2025-08-11 DEVICE — IMPLANTABLE DEVICE: Type: IMPLANTABLE DEVICE | Site: SPINE LUMBAR | Status: FUNCTIONAL

## 2025-08-11 DEVICE — IMPL SPINE OPTIMESH MEDIUM: Type: IMPLANTABLE DEVICE | Site: SPINE LUMBAR | Status: FUNCTIONAL

## 2025-08-11 DEVICE — ALLOGRAFT BNE DIVERTED TUBE 3/4 FILL PREFIL FOR E: Type: IMPLANTABLE DEVICE | Site: SPINE LUMBAR | Status: FUNCTIONAL

## 2025-08-11 RX ORDER — ACETAMINOPHEN 325 MG/1
650 TABLET ORAL EVERY 6 HOURS
Status: DISCONTINUED | OUTPATIENT
Start: 2025-08-11 | End: 2025-08-16 | Stop reason: HOSPADM

## 2025-08-11 RX ORDER — DEXAMETHASONE SODIUM PHOSPHATE 4 MG/ML
8 INJECTION, SOLUTION INTRA-ARTICULAR; INTRALESIONAL; INTRAMUSCULAR; INTRAVENOUS; SOFT TISSUE ONCE
Status: COMPLETED | OUTPATIENT
Start: 2025-08-11 | End: 2025-08-11

## 2025-08-11 RX ORDER — DEXMEDETOMIDINE HYDROCHLORIDE 100 UG/ML
INJECTION, SOLUTION INTRAVENOUS
Status: DISCONTINUED | OUTPATIENT
Start: 2025-08-11 | End: 2025-08-11 | Stop reason: SDUPTHER

## 2025-08-11 RX ORDER — PREGABALIN 75 MG/1
75 CAPSULE ORAL ONCE
Status: COMPLETED | OUTPATIENT
Start: 2025-08-11 | End: 2025-08-11

## 2025-08-11 RX ORDER — EPHEDRINE SULFATE/0.9% NACL/PF 50 MG/5 ML
SYRINGE (ML) INTRAVENOUS
Status: DISCONTINUED | OUTPATIENT
Start: 2025-08-11 | End: 2025-08-11 | Stop reason: SDUPTHER

## 2025-08-11 RX ORDER — OXYCODONE HYDROCHLORIDE 5 MG/1
5 TABLET ORAL ONCE
Status: DISCONTINUED | OUTPATIENT
Start: 2025-08-11 | End: 2025-08-11 | Stop reason: HOSPADM

## 2025-08-11 RX ORDER — FAMOTIDINE 20 MG/1
20 TABLET, FILM COATED ORAL ONCE
Status: COMPLETED | OUTPATIENT
Start: 2025-08-11 | End: 2025-08-11

## 2025-08-11 RX ORDER — SODIUM CHLORIDE 9 MG/ML
INJECTION, SOLUTION INTRAVENOUS PRN
Status: DISCONTINUED | OUTPATIENT
Start: 2025-08-11 | End: 2025-08-11 | Stop reason: HOSPADM

## 2025-08-11 RX ORDER — DIPHENHYDRAMINE HCL 25 MG
25 CAPSULE ORAL EVERY 6 HOURS PRN
Status: DISCONTINUED | OUTPATIENT
Start: 2025-08-11 | End: 2025-08-16 | Stop reason: HOSPADM

## 2025-08-11 RX ORDER — ONDANSETRON 2 MG/ML
INJECTION INTRAMUSCULAR; INTRAVENOUS
Status: DISCONTINUED | OUTPATIENT
Start: 2025-08-11 | End: 2025-08-11 | Stop reason: SDUPTHER

## 2025-08-11 RX ORDER — SODIUM CHLORIDE 0.9 % (FLUSH) 0.9 %
5-40 SYRINGE (ML) INJECTION EVERY 12 HOURS SCHEDULED
Status: DISCONTINUED | OUTPATIENT
Start: 2025-08-11 | End: 2025-08-16 | Stop reason: HOSPADM

## 2025-08-11 RX ORDER — ONDANSETRON 2 MG/ML
4 INJECTION INTRAMUSCULAR; INTRAVENOUS EVERY 6 HOURS PRN
Status: DISCONTINUED | OUTPATIENT
Start: 2025-08-11 | End: 2025-08-16 | Stop reason: HOSPADM

## 2025-08-11 RX ORDER — ONDANSETRON 2 MG/ML
4 INJECTION INTRAMUSCULAR; INTRAVENOUS
Status: DISCONTINUED | OUTPATIENT
Start: 2025-08-11 | End: 2025-08-11 | Stop reason: HOSPADM

## 2025-08-11 RX ORDER — OXYCODONE HYDROCHLORIDE 5 MG/1
5 TABLET ORAL EVERY 6 HOURS PRN
Qty: 28 TABLET | Refills: 0 | Status: SHIPPED | OUTPATIENT
Start: 2025-08-11 | End: 2025-08-18

## 2025-08-11 RX ORDER — METAXALONE 800 MG/1
800 TABLET ORAL EVERY 8 HOURS PRN
Status: DISCONTINUED | OUTPATIENT
Start: 2025-08-11 | End: 2025-08-14

## 2025-08-11 RX ORDER — PHENAZOPYRIDINE HYDROCHLORIDE 100 MG/1
100 TABLET, FILM COATED ORAL PRN
Status: DISCONTINUED | OUTPATIENT
Start: 2025-08-11 | End: 2025-08-16 | Stop reason: HOSPADM

## 2025-08-11 RX ORDER — OXYCODONE HYDROCHLORIDE 5 MG/1
5 TABLET ORAL EVERY 4 HOURS PRN
Status: DISCONTINUED | OUTPATIENT
Start: 2025-08-11 | End: 2025-08-14

## 2025-08-11 RX ORDER — SODIUM CHLORIDE, SODIUM LACTATE, POTASSIUM CHLORIDE, CALCIUM CHLORIDE 600; 310; 30; 20 MG/100ML; MG/100ML; MG/100ML; MG/100ML
INJECTION, SOLUTION INTRAVENOUS CONTINUOUS
Status: DISCONTINUED | OUTPATIENT
Start: 2025-08-11 | End: 2025-08-11 | Stop reason: HOSPADM

## 2025-08-11 RX ORDER — GLYCOPYRROLATE 0.2 MG/ML
INJECTION INTRAMUSCULAR; INTRAVENOUS
Status: DISCONTINUED | OUTPATIENT
Start: 2025-08-11 | End: 2025-08-11 | Stop reason: SDUPTHER

## 2025-08-11 RX ORDER — SODIUM CHLORIDE 450 MG/100ML
INJECTION, SOLUTION INTRAVENOUS CONTINUOUS
Status: DISCONTINUED | OUTPATIENT
Start: 2025-08-11 | End: 2025-08-14

## 2025-08-11 RX ORDER — FENTANYL CITRATE 50 UG/ML
INJECTION, SOLUTION INTRAMUSCULAR; INTRAVENOUS
Status: DISCONTINUED | OUTPATIENT
Start: 2025-08-11 | End: 2025-08-11 | Stop reason: SDUPTHER

## 2025-08-11 RX ORDER — ACETAMINOPHEN 325 MG/1
650 TABLET ORAL
Status: DISCONTINUED | OUTPATIENT
Start: 2025-08-11 | End: 2025-08-11 | Stop reason: HOSPADM

## 2025-08-11 RX ORDER — OXYCODONE HYDROCHLORIDE 5 MG/1
5 TABLET ORAL EVERY 6 HOURS PRN
Qty: 28 TABLET | Refills: 0 | Status: SHIPPED | OUTPATIENT
Start: 2025-08-11 | End: 2025-08-11

## 2025-08-11 RX ORDER — SODIUM CHLORIDE 0.9 % (FLUSH) 0.9 %
5-40 SYRINGE (ML) INJECTION PRN
Status: DISCONTINUED | OUTPATIENT
Start: 2025-08-11 | End: 2025-08-11 | Stop reason: HOSPADM

## 2025-08-11 RX ORDER — ONDANSETRON 4 MG/1
4 TABLET, ORALLY DISINTEGRATING ORAL EVERY 8 HOURS PRN
Status: DISCONTINUED | OUTPATIENT
Start: 2025-08-11 | End: 2025-08-16 | Stop reason: HOSPADM

## 2025-08-11 RX ORDER — POLYETHYLENE GLYCOL 3350 17 G/17G
17 POWDER, FOR SOLUTION ORAL DAILY
Status: DISCONTINUED | OUTPATIENT
Start: 2025-08-11 | End: 2025-08-16 | Stop reason: HOSPADM

## 2025-08-11 RX ORDER — MIDAZOLAM HYDROCHLORIDE 1 MG/ML
INJECTION, SOLUTION INTRAMUSCULAR; INTRAVENOUS
Status: DISCONTINUED | OUTPATIENT
Start: 2025-08-11 | End: 2025-08-11 | Stop reason: SDUPTHER

## 2025-08-11 RX ORDER — HYDROMORPHONE HYDROCHLORIDE 2 MG/ML
INJECTION, SOLUTION INTRAMUSCULAR; INTRAVENOUS; SUBCUTANEOUS
Status: DISCONTINUED | OUTPATIENT
Start: 2025-08-11 | End: 2025-08-11 | Stop reason: SDUPTHER

## 2025-08-11 RX ORDER — SODIUM CHLORIDE 0.9 % (FLUSH) 0.9 %
5-40 SYRINGE (ML) INJECTION EVERY 12 HOURS SCHEDULED
Status: DISCONTINUED | OUTPATIENT
Start: 2025-08-11 | End: 2025-08-11 | Stop reason: HOSPADM

## 2025-08-11 RX ORDER — BISACODYL 5 MG/1
5 TABLET, DELAYED RELEASE ORAL DAILY
Status: DISCONTINUED | OUTPATIENT
Start: 2025-08-11 | End: 2025-08-16 | Stop reason: HOSPADM

## 2025-08-11 RX ORDER — DIPHENHYDRAMINE HYDROCHLORIDE 50 MG/ML
25 INJECTION, SOLUTION INTRAMUSCULAR; INTRAVENOUS EVERY 6 HOURS PRN
Status: DISCONTINUED | OUTPATIENT
Start: 2025-08-11 | End: 2025-08-16 | Stop reason: HOSPADM

## 2025-08-11 RX ORDER — OXYCODONE HYDROCHLORIDE 5 MG/1
10 TABLET ORAL EVERY 4 HOURS PRN
Status: DISCONTINUED | OUTPATIENT
Start: 2025-08-11 | End: 2025-08-14

## 2025-08-11 RX ORDER — ONDANSETRON 4 MG/1
4 TABLET, FILM COATED ORAL EVERY 8 HOURS PRN
Status: DISCONTINUED | OUTPATIENT
Start: 2025-08-11 | End: 2025-08-11

## 2025-08-11 RX ORDER — ACETAMINOPHEN 325 MG/1
650 TABLET ORAL EVERY 6 HOURS PRN
Status: DISCONTINUED | OUTPATIENT
Start: 2025-08-11 | End: 2025-08-16 | Stop reason: HOSPADM

## 2025-08-11 RX ORDER — ACETAMINOPHEN 500 MG
1000 TABLET ORAL ONCE
Status: COMPLETED | OUTPATIENT
Start: 2025-08-11 | End: 2025-08-11

## 2025-08-11 RX ORDER — MIDAZOLAM HYDROCHLORIDE 2 MG/2ML
2 INJECTION, SOLUTION INTRAMUSCULAR; INTRAVENOUS ONCE
Status: COMPLETED | OUTPATIENT
Start: 2025-08-11 | End: 2025-08-11

## 2025-08-11 RX ORDER — FAMOTIDINE 20 MG/1
20 TABLET, FILM COATED ORAL 2 TIMES DAILY
Status: DISCONTINUED | OUTPATIENT
Start: 2025-08-11 | End: 2025-08-16 | Stop reason: HOSPADM

## 2025-08-11 RX ORDER — HYDROMORPHONE HYDROCHLORIDE 2 MG/ML
0.5 INJECTION, SOLUTION INTRAMUSCULAR; INTRAVENOUS; SUBCUTANEOUS
Status: DISCONTINUED | OUTPATIENT
Start: 2025-08-11 | End: 2025-08-14

## 2025-08-11 RX ORDER — FENTANYL CITRATE 50 UG/ML
25 INJECTION, SOLUTION INTRAMUSCULAR; INTRAVENOUS EVERY 5 MIN PRN
Status: DISCONTINUED | OUTPATIENT
Start: 2025-08-11 | End: 2025-08-11 | Stop reason: HOSPADM

## 2025-08-11 RX ORDER — HYDROMORPHONE HYDROCHLORIDE 2 MG/ML
0.25 INJECTION, SOLUTION INTRAMUSCULAR; INTRAVENOUS; SUBCUTANEOUS
Status: DISCONTINUED | OUTPATIENT
Start: 2025-08-11 | End: 2025-08-14

## 2025-08-11 RX ORDER — PROMETHAZINE HYDROCHLORIDE 12.5 MG/1
12.5 TABLET ORAL ONCE
Status: COMPLETED | OUTPATIENT
Start: 2025-08-11 | End: 2025-08-11

## 2025-08-11 RX ORDER — LIDOCAINE HYDROCHLORIDE 20 MG/ML
INJECTION, SOLUTION EPIDURAL; INFILTRATION; INTRACAUDAL; PERINEURAL
Status: DISCONTINUED | OUTPATIENT
Start: 2025-08-11 | End: 2025-08-11 | Stop reason: SDUPTHER

## 2025-08-11 RX ORDER — SACUBITRIL AND VALSARTAN 24; 26 MG/1; MG/1
1 TABLET ORAL 2 TIMES DAILY
Status: DISCONTINUED | OUTPATIENT
Start: 2025-08-11 | End: 2025-08-16 | Stop reason: HOSPADM

## 2025-08-11 RX ORDER — ZOLPIDEM TARTRATE 5 MG/1
10 TABLET ORAL NIGHTLY
Status: DISCONTINUED | OUTPATIENT
Start: 2025-08-11 | End: 2025-08-16 | Stop reason: HOSPADM

## 2025-08-11 RX ORDER — SUCCINYLCHOLINE/SOD CL,ISO/PF 100 MG/5ML
SYRINGE (ML) INTRAVENOUS
Status: DISCONTINUED | OUTPATIENT
Start: 2025-08-11 | End: 2025-08-11 | Stop reason: SDUPTHER

## 2025-08-11 RX ORDER — VANCOMYCIN HYDROCHLORIDE 1 G/20ML
INJECTION, POWDER, LYOPHILIZED, FOR SOLUTION INTRAVENOUS PRN
Status: DISCONTINUED | OUTPATIENT
Start: 2025-08-11 | End: 2025-08-11 | Stop reason: ALTCHOICE

## 2025-08-11 RX ORDER — PROPOFOL 10 MG/ML
INJECTION, EMULSION INTRAVENOUS
Status: DISCONTINUED | OUTPATIENT
Start: 2025-08-11 | End: 2025-08-11 | Stop reason: SDUPTHER

## 2025-08-11 RX ORDER — CARVEDILOL 12.5 MG/1
12.5 TABLET ORAL 2 TIMES DAILY
Status: DISCONTINUED | OUTPATIENT
Start: 2025-08-11 | End: 2025-08-16 | Stop reason: HOSPADM

## 2025-08-11 RX ORDER — ATORVASTATIN CALCIUM 40 MG/1
80 TABLET, FILM COATED ORAL NIGHTLY
Status: DISCONTINUED | OUTPATIENT
Start: 2025-08-11 | End: 2025-08-16 | Stop reason: HOSPADM

## 2025-08-11 RX ADMIN — SODIUM CHLORIDE, SODIUM LACTATE, POTASSIUM CHLORIDE, AND CALCIUM CHLORIDE: 600; 310; 30; 20 INJECTION, SOLUTION INTRAVENOUS at 08:25

## 2025-08-11 RX ADMIN — DEXMEDETOMIDINE 2 MCG: 200 INJECTION, SOLUTION INTRAVENOUS at 13:17

## 2025-08-11 RX ADMIN — PREGABALIN 75 MG: 75 CAPSULE ORAL at 08:25

## 2025-08-11 RX ADMIN — ACETAMINOPHEN 650 MG: 325 TABLET ORAL at 21:07

## 2025-08-11 RX ADMIN — SACUBITRIL AND VALSARTAN 1 TABLET: 24; 26 TABLET, FILM COATED ORAL at 15:30

## 2025-08-11 RX ADMIN — TRANEXAMIC ACID 1000 MG: 100 INJECTION, SOLUTION INTRAVENOUS at 11:33

## 2025-08-11 RX ADMIN — HYDROMORPHONE HYDROCHLORIDE 0.2 MG: 2 INJECTION INTRAMUSCULAR; INTRAVENOUS; SUBCUTANEOUS at 11:59

## 2025-08-11 RX ADMIN — DEXMEDETOMIDINE 2 MCG: 200 INJECTION, SOLUTION INTRAVENOUS at 13:09

## 2025-08-11 RX ADMIN — DEXMEDETOMIDINE 2 MCG: 200 INJECTION, SOLUTION INTRAVENOUS at 13:12

## 2025-08-11 RX ADMIN — ATORVASTATIN CALCIUM 80 MG: 40 TABLET, FILM COATED ORAL at 21:07

## 2025-08-11 RX ADMIN — Medication 5 MG: at 11:05

## 2025-08-11 RX ADMIN — ACETAMINOPHEN 1000 MG: 500 TABLET ORAL at 08:24

## 2025-08-11 RX ADMIN — TRANEXAMIC ACID 1000 MG: 100 INJECTION, SOLUTION INTRAVENOUS at 12:43

## 2025-08-11 RX ADMIN — DEXMEDETOMIDINE 2 MCG: 200 INJECTION, SOLUTION INTRAVENOUS at 13:15

## 2025-08-11 RX ADMIN — FENTANYL CITRATE 25 MCG: 50 INJECTION INTRAMUSCULAR; INTRAVENOUS at 10:57

## 2025-08-11 RX ADMIN — ACETAMINOPHEN 650 MG: 325 TABLET ORAL at 15:30

## 2025-08-11 RX ADMIN — DIPHENHYDRAMINE HYDROCHLORIDE 25 MG: 50 INJECTION INTRAMUSCULAR; INTRAVENOUS at 23:58

## 2025-08-11 RX ADMIN — DEXAMETHASONE SODIUM PHOSPHATE 8 MG: 4 INJECTION, SOLUTION INTRAMUSCULAR; INTRAVENOUS at 11:45

## 2025-08-11 RX ADMIN — FAMOTIDINE 20 MG: 20 TABLET, FILM COATED ORAL at 21:08

## 2025-08-11 RX ADMIN — OXYCODONE HYDROCHLORIDE 10 MG: 5 TABLET ORAL at 15:31

## 2025-08-11 RX ADMIN — CARVEDILOL 12.5 MG: 12.5 TABLET, FILM COATED ORAL at 21:08

## 2025-08-11 RX ADMIN — WATER 2000 MG: 1 INJECTION INTRAMUSCULAR; INTRAVENOUS; SUBCUTANEOUS at 11:27

## 2025-08-11 RX ADMIN — CARVEDILOL 12.5 MG: 12.5 TABLET, FILM COATED ORAL at 15:30

## 2025-08-11 RX ADMIN — HYDROMORPHONE HYDROCHLORIDE 0.2 MG: 2 INJECTION INTRAMUSCULAR; INTRAVENOUS; SUBCUTANEOUS at 12:36

## 2025-08-11 RX ADMIN — SODIUM CHLORIDE: 0.45 INJECTION, SOLUTION INTRAVENOUS at 14:56

## 2025-08-11 RX ADMIN — FAMOTIDINE 20 MG: 20 TABLET, FILM COATED ORAL at 08:25

## 2025-08-11 RX ADMIN — FENTANYL CITRATE 25 MCG: 50 INJECTION INTRAMUSCULAR; INTRAVENOUS at 11:54

## 2025-08-11 RX ADMIN — CEFAZOLIN 2000 MG: 1 INJECTION, POWDER, FOR SOLUTION INTRAMUSCULAR; INTRAVENOUS at 21:08

## 2025-08-11 RX ADMIN — Medication 5 MG: at 11:04

## 2025-08-11 RX ADMIN — PROMETHAZINE HYDROCHLORIDE 12.5 MG: 12.5 TABLET ORAL at 08:24

## 2025-08-11 RX ADMIN — GLYCOPYRROLATE 0.4 MG: 0.2 INJECTION, SOLUTION INTRAMUSCULAR; INTRAVENOUS at 11:12

## 2025-08-11 RX ADMIN — PROPOFOL 85 MCG/KG/MIN: 10 INJECTION, EMULSION INTRAVENOUS at 11:32

## 2025-08-11 RX ADMIN — Medication 5 MG: at 12:52

## 2025-08-11 RX ADMIN — BISACODYL 5 MG: 5 TABLET, COATED ORAL at 15:30

## 2025-08-11 RX ADMIN — HYDROMORPHONE HYDROCHLORIDE 0.2 MG: 2 INJECTION INTRAMUSCULAR; INTRAVENOUS; SUBCUTANEOUS at 13:08

## 2025-08-11 RX ADMIN — PROPOFOL 150 MG: 10 INJECTION, EMULSION INTRAVENOUS at 11:01

## 2025-08-11 RX ADMIN — SODIUM CHLORIDE, SODIUM LACTATE, POTASSIUM CHLORIDE, AND CALCIUM CHLORIDE: 600; 310; 30; 20 INJECTION, SOLUTION INTRAVENOUS at 12:17

## 2025-08-11 RX ADMIN — FENTANYL CITRATE 25 MCG: 50 INJECTION INTRAMUSCULAR; INTRAVENOUS at 11:01

## 2025-08-11 RX ADMIN — ZOLPIDEM TARTRATE 10 MG: 5 TABLET ORAL at 21:08

## 2025-08-11 RX ADMIN — SODIUM CHLORIDE, PRESERVATIVE FREE 10 ML: 5 INJECTION INTRAVENOUS at 21:08

## 2025-08-11 RX ADMIN — MIDAZOLAM 2 MG: 1 INJECTION, SOLUTION INTRAMUSCULAR; INTRAVENOUS at 10:57

## 2025-08-11 RX ADMIN — DEXMEDETOMIDINE 4 MCG: 200 INJECTION, SOLUTION INTRAVENOUS at 10:57

## 2025-08-11 RX ADMIN — HYDROMORPHONE HYDROCHLORIDE 0.4 MG: 2 INJECTION INTRAMUSCULAR; INTRAVENOUS; SUBCUTANEOUS at 13:19

## 2025-08-11 RX ADMIN — FENTANYL CITRATE 25 MCG: 50 INJECTION INTRAMUSCULAR; INTRAVENOUS at 12:00

## 2025-08-11 RX ADMIN — Medication 100 MG: at 11:01

## 2025-08-11 RX ADMIN — POLYETHYLENE GLYCOL 3350 17 G: 17 POWDER, FOR SOLUTION ORAL at 15:41

## 2025-08-11 RX ADMIN — Medication 5 MG: at 11:35

## 2025-08-11 RX ADMIN — HYDROMORPHONE HYDROCHLORIDE 0.5 MG: 2 INJECTION, SOLUTION INTRAMUSCULAR; INTRAVENOUS; SUBCUTANEOUS at 23:58

## 2025-08-11 RX ADMIN — SACUBITRIL AND VALSARTAN 1 TABLET: 24; 26 TABLET, FILM COATED ORAL at 21:07

## 2025-08-11 RX ADMIN — LIDOCAINE HYDROCHLORIDE 100 MG: 20 INJECTION, SOLUTION EPIDURAL; INFILTRATION; INTRACAUDAL; PERINEURAL at 11:01

## 2025-08-11 RX ADMIN — ONDANSETRON 4 MG: 2 INJECTION, SOLUTION INTRAMUSCULAR; INTRAVENOUS at 12:42

## 2025-08-11 RX ADMIN — MIDAZOLAM HYDROCHLORIDE 1 MG: 1 INJECTION, SOLUTION INTRAMUSCULAR; INTRAVENOUS at 09:56

## 2025-08-11 ASSESSMENT — PAIN SCALES - GENERAL
PAINLEVEL_OUTOF10: 7
PAINLEVEL_OUTOF10: 0
PAINLEVEL_OUTOF10: 7
PAINLEVEL_OUTOF10: 0
PAINLEVEL_OUTOF10: 4
PAINLEVEL_OUTOF10: 0

## 2025-08-11 ASSESSMENT — PAIN DESCRIPTION - ORIENTATION
ORIENTATION: POSTERIOR
ORIENTATION: RIGHT;LEFT
ORIENTATION: LOWER
ORIENTATION: POSTERIOR

## 2025-08-11 ASSESSMENT — PAIN DESCRIPTION - FREQUENCY
FREQUENCY: INTERMITTENT
FREQUENCY: INTERMITTENT

## 2025-08-11 ASSESSMENT — PAIN DESCRIPTION - DESCRIPTORS
DESCRIPTORS: ACHING
DESCRIPTORS: ACHING

## 2025-08-11 ASSESSMENT — PAIN DESCRIPTION - LOCATION
LOCATION: BACK

## 2025-08-11 ASSESSMENT — PAIN - FUNCTIONAL ASSESSMENT
PAIN_FUNCTIONAL_ASSESSMENT: ACTIVITIES ARE NOT PREVENTED

## 2025-08-11 ASSESSMENT — PAIN DESCRIPTION - DIRECTION: RADIATING_TOWARDS: NO

## 2025-08-11 ASSESSMENT — PAIN DESCRIPTION - PAIN TYPE
TYPE: SURGICAL PAIN
TYPE: SURGICAL PAIN;OTHER (COMMENT)
TYPE: SURGICAL PAIN
TYPE: SURGICAL PAIN

## 2025-08-11 ASSESSMENT — PAIN DESCRIPTION - ONSET
ONSET: GRADUAL
ONSET: GRADUAL

## 2025-08-12 ENCOUNTER — APPOINTMENT (OUTPATIENT)
Facility: HOSPITAL | Age: 70
DRG: 451 | End: 2025-08-12
Attending: ORTHOPAEDIC SURGERY
Payer: MEDICARE

## 2025-08-12 PROBLEM — R29.898 WEAKNESS OF LEFT LOWER EXTREMITY: Status: ACTIVE | Noted: 2025-08-12

## 2025-08-12 PROBLEM — Z98.1 S/P LUMBAR SPINAL FUSION: Status: ACTIVE | Noted: 2025-08-12

## 2025-08-12 LAB
ANION GAP SERPL CALC-SCNC: 12 MMOL/L (ref 3–18)
BUN SERPL-MCNC: 17 MG/DL (ref 6–23)
BUN/CREAT SERPL: 15 (ref 12–20)
CALCIUM SERPL-MCNC: 9.1 MG/DL (ref 8.5–10.1)
CHLORIDE SERPL-SCNC: 105 MMOL/L (ref 98–107)
CHOLEST SERPL-MCNC: 258 MG/DL
CO2 SERPL-SCNC: 21 MMOL/L (ref 21–32)
CREAT SERPL-MCNC: 1.16 MG/DL (ref 0.6–1.3)
ECHO BSA: 1.98 M2
ERYTHROCYTE [DISTWIDTH] IN BLOOD BY AUTOMATED COUNT: 12 % (ref 11.6–14.5)
EST. AVERAGE GLUCOSE BLD GHB EST-MCNC: 119 MG/DL
GLUCOSE BLD STRIP.AUTO-MCNC: 115 MG/DL (ref 70–110)
GLUCOSE BLD STRIP.AUTO-MCNC: 170 MG/DL (ref 70–110)
GLUCOSE SERPL-MCNC: 139 MG/DL (ref 74–108)
HBA1C MFR BLD: 5.8 % (ref 4.2–5.6)
HCT VFR BLD AUTO: 41.6 % (ref 35–45)
HDLC SERPL-MCNC: 58 MG/DL (ref 40–60)
HDLC SERPL: 4.4 (ref 0–5)
HGB BLD-MCNC: 13.8 G/DL (ref 12–16)
LDLC SERPL CALC-MCNC: 156 MG/DL (ref 0–100)
MCH RBC QN AUTO: 31.9 PG (ref 24–34)
MCHC RBC AUTO-ENTMCNC: 33.2 G/DL (ref 31–37)
MCV RBC AUTO: 96.3 FL (ref 78–100)
NRBC # BLD: 0 K/UL (ref 0–0.01)
NRBC BLD-RTO: 0 PER 100 WBC
PLATELET # BLD AUTO: 242 K/UL (ref 135–420)
PMV BLD AUTO: 11.7 FL (ref 9.2–11.8)
POTASSIUM SERPL-SCNC: 4.4 MMOL/L (ref 3.5–5.5)
RBC # BLD AUTO: 4.32 M/UL (ref 4.2–5.3)
SODIUM SERPL-SCNC: 139 MMOL/L (ref 136–145)
TRIGL SERPL-MCNC: 222 MG/DL (ref 0–150)
VAS LEFT CCA DIST EDV: 12.8 CM/S
VAS LEFT CCA DIST PSV: 60.7 CM/S
VAS LEFT CCA MID EDV: 10.22 CM/S
VAS LEFT CCA MID PSV: 64.55 CM/S
VAS LEFT CCA PROX EDV: 15.4 CM/S
VAS LEFT CCA PROX PSV: 71 CM/S
VAS LEFT ECA EDV: 7.63 CM/S
VAS LEFT ECA PSV: 84 CM/S
VAS LEFT ICA DIST EDV: 15.3 CM/S
VAS LEFT ICA DIST PSV: 57.4 CM/S
VAS LEFT ICA MID EDV: 10.6 CM/S
VAS LEFT ICA MID PSV: 40.1 CM/S
VAS LEFT ICA PROX EDV: 12.8 CM/S
VAS LEFT ICA PROX PSV: 64.5 CM/S
VAS LEFT ICA/CCA PSV: 1.06 NO UNITS
VAS LEFT SUBCLAVIAN PROX EDV: 0 CM/S
VAS LEFT SUBCLAVIAN PROX PSV: 130.7 CM/S
VAS LEFT VERTEBRAL EDV: 17.47 CM/S
VAS LEFT VERTEBRAL PSV: 61.4 CM/S
VAS RIGHT CCA DIST EDV: 9.8 CM/S
VAS RIGHT CCA DIST PSV: 57.1 CM/S
VAS RIGHT CCA MID EDV: 14.22 CM/S
VAS RIGHT CCA MID PSV: 88.56 CM/S
VAS RIGHT CCA PROX EDV: 8.8 CM/S
VAS RIGHT CCA PROX PSV: 76.5 CM/S
VAS RIGHT ECA EDV: 7.36 CM/S
VAS RIGHT ECA PSV: 96 CM/S
VAS RIGHT ICA DIST EDV: 10.6 CM/S
VAS RIGHT ICA DIST PSV: 56.9 CM/S
VAS RIGHT ICA MID EDV: 12.7 CM/S
VAS RIGHT ICA MID PSV: 52.2 CM/S
VAS RIGHT ICA PROX EDV: 5.8 CM/S
VAS RIGHT ICA PROX PSV: 49.2 CM/S
VAS RIGHT ICA/CCA PSV: 1 NO UNITS
VAS RIGHT SUBCLAVIAN PROX EDV: 0 CM/S
VAS RIGHT SUBCLAVIAN PROX PSV: 71 CM/S
VAS RIGHT VERTEBRAL EDV: 9.89 CM/S
VAS RIGHT VERTEBRAL PSV: 38.4 CM/S
VLDLC SERPL CALC-MCNC: 44 MG/DL
WBC # BLD AUTO: 10.7 K/UL (ref 4.6–13.2)

## 2025-08-12 PROCEDURE — 97162 PT EVAL MOD COMPLEX 30 MIN: CPT

## 2025-08-12 PROCEDURE — 70498 CT ANGIOGRAPHY NECK: CPT

## 2025-08-12 PROCEDURE — 6360000004 HC RX CONTRAST MEDICATION

## 2025-08-12 PROCEDURE — 2500000003 HC RX 250 WO HCPCS: Performed by: ORTHOPAEDIC SURGERY

## 2025-08-12 PROCEDURE — 99223 1ST HOSP IP/OBS HIGH 75: CPT | Performed by: STUDENT IN AN ORGANIZED HEALTH CARE EDUCATION/TRAINING PROGRAM

## 2025-08-12 PROCEDURE — 72131 CT LUMBAR SPINE W/O DYE: CPT

## 2025-08-12 PROCEDURE — 96374 THER/PROPH/DIAG INJ IV PUSH: CPT

## 2025-08-12 PROCEDURE — 36415 COLL VENOUS BLD VENIPUNCTURE: CPT

## 2025-08-12 PROCEDURE — 92610 EVALUATE SWALLOWING FUNCTION: CPT

## 2025-08-12 PROCEDURE — 6370000000 HC RX 637 (ALT 250 FOR IP): Performed by: ORTHOPAEDIC SURGERY

## 2025-08-12 PROCEDURE — 94761 N-INVAS EAR/PLS OXIMETRY MLT: CPT

## 2025-08-12 PROCEDURE — 6370000000 HC RX 637 (ALT 250 FOR IP)

## 2025-08-12 PROCEDURE — 97112 NEUROMUSCULAR REEDUCATION: CPT

## 2025-08-12 PROCEDURE — 2580000003 HC RX 258: Performed by: ORTHOPAEDIC SURGERY

## 2025-08-12 PROCEDURE — 80061 LIPID PANEL: CPT

## 2025-08-12 PROCEDURE — 6360000002 HC RX W HCPCS: Performed by: ORTHOPAEDIC SURGERY

## 2025-08-12 PROCEDURE — 70553 MRI BRAIN STEM W/O & W/DYE: CPT

## 2025-08-12 PROCEDURE — G0378 HOSPITAL OBSERVATION PER HR: HCPCS

## 2025-08-12 PROCEDURE — A9575 INJ GADOTERATE MEGLUMI 0.1ML: HCPCS

## 2025-08-12 PROCEDURE — 93880 EXTRACRANIAL BILAT STUDY: CPT

## 2025-08-12 PROCEDURE — 70544 MR ANGIOGRAPHY HEAD W/O DYE: CPT

## 2025-08-12 PROCEDURE — 93880 EXTRACRANIAL BILAT STUDY: CPT | Performed by: INTERNAL MEDICINE

## 2025-08-12 PROCEDURE — 72158 MRI LUMBAR SPINE W/O & W/DYE: CPT

## 2025-08-12 PROCEDURE — 51702 INSERT TEMP BLADDER CATH: CPT

## 2025-08-12 PROCEDURE — 80048 BASIC METABOLIC PNL TOTAL CA: CPT

## 2025-08-12 PROCEDURE — 97530 THERAPEUTIC ACTIVITIES: CPT

## 2025-08-12 PROCEDURE — 97166 OT EVAL MOD COMPLEX 45 MIN: CPT

## 2025-08-12 PROCEDURE — 82962 GLUCOSE BLOOD TEST: CPT

## 2025-08-12 PROCEDURE — 70450 CT HEAD/BRAIN W/O DYE: CPT

## 2025-08-12 PROCEDURE — 83036 HEMOGLOBIN GLYCOSYLATED A1C: CPT

## 2025-08-12 PROCEDURE — 92526 ORAL FUNCTION THERAPY: CPT

## 2025-08-12 PROCEDURE — 85027 COMPLETE CBC AUTOMATED: CPT

## 2025-08-12 PROCEDURE — 97535 SELF CARE MNGMENT TRAINING: CPT

## 2025-08-12 PROCEDURE — 99221 1ST HOSP IP/OBS SF/LOW 40: CPT | Performed by: STUDENT IN AN ORGANIZED HEALTH CARE EDUCATION/TRAINING PROGRAM

## 2025-08-12 PROCEDURE — 6360000004 HC RX CONTRAST MEDICATION: Performed by: ORTHOPAEDIC SURGERY

## 2025-08-12 RX ORDER — LABETALOL HYDROCHLORIDE 5 MG/ML
10 INJECTION, SOLUTION INTRAVENOUS EVERY 10 MIN PRN
Status: DISCONTINUED | OUTPATIENT
Start: 2025-08-12 | End: 2025-08-16 | Stop reason: HOSPADM

## 2025-08-12 RX ORDER — IOPAMIDOL 755 MG/ML
100 INJECTION, SOLUTION INTRAVASCULAR
Status: COMPLETED | OUTPATIENT
Start: 2025-08-12 | End: 2025-08-12

## 2025-08-12 RX ORDER — ASPIRIN 81 MG/1
81 TABLET, CHEWABLE ORAL DAILY
Status: DISCONTINUED | OUTPATIENT
Start: 2025-08-12 | End: 2025-08-16 | Stop reason: HOSPADM

## 2025-08-12 RX ADMIN — ACETAMINOPHEN 650 MG: 325 TABLET ORAL at 04:09

## 2025-08-12 RX ADMIN — ZOLPIDEM TARTRATE 10 MG: 5 TABLET ORAL at 22:10

## 2025-08-12 RX ADMIN — CARVEDILOL 12.5 MG: 12.5 TABLET, FILM COATED ORAL at 10:00

## 2025-08-12 RX ADMIN — SODIUM CHLORIDE: 0.45 INJECTION, SOLUTION INTRAVENOUS at 04:22

## 2025-08-12 RX ADMIN — OXYCODONE HYDROCHLORIDE 10 MG: 5 TABLET ORAL at 15:53

## 2025-08-12 RX ADMIN — OXYCODONE HYDROCHLORIDE 10 MG: 5 TABLET ORAL at 06:09

## 2025-08-12 RX ADMIN — FAMOTIDINE 20 MG: 20 TABLET, FILM COATED ORAL at 22:11

## 2025-08-12 RX ADMIN — ACETAMINOPHEN 650 MG: 325 TABLET ORAL at 10:00

## 2025-08-12 RX ADMIN — CEFAZOLIN 2000 MG: 1 INJECTION, POWDER, FOR SOLUTION INTRAMUSCULAR; INTRAVENOUS at 04:09

## 2025-08-12 RX ADMIN — ASPIRIN 81 MG CHEWABLE TABLET 81 MG: 81 TABLET CHEWABLE at 10:00

## 2025-08-12 RX ADMIN — BISACODYL 5 MG: 5 TABLET, COATED ORAL at 10:00

## 2025-08-12 RX ADMIN — HYDROMORPHONE HYDROCHLORIDE 0.5 MG: 2 INJECTION, SOLUTION INTRAMUSCULAR; INTRAVENOUS; SUBCUTANEOUS at 22:11

## 2025-08-12 RX ADMIN — ACETAMINOPHEN 650 MG: 325 TABLET ORAL at 22:10

## 2025-08-12 RX ADMIN — IOPAMIDOL 100 ML: 755 INJECTION, SOLUTION INTRAVENOUS at 09:36

## 2025-08-12 RX ADMIN — SACUBITRIL AND VALSARTAN 1 TABLET: 24; 26 TABLET, FILM COATED ORAL at 10:00

## 2025-08-12 RX ADMIN — GADOTERATE MEGLUMINE 15 ML: 376.9 INJECTION INTRAVENOUS at 14:37

## 2025-08-12 RX ADMIN — ATORVASTATIN CALCIUM 80 MG: 40 TABLET, FILM COATED ORAL at 22:10

## 2025-08-12 RX ADMIN — FAMOTIDINE 20 MG: 20 TABLET, FILM COATED ORAL at 10:00

## 2025-08-12 RX ADMIN — POLYETHYLENE GLYCOL 3350 17 G: 17 POWDER, FOR SOLUTION ORAL at 10:00

## 2025-08-12 RX ADMIN — SODIUM CHLORIDE, PRESERVATIVE FREE 10 ML: 5 INJECTION INTRAVENOUS at 22:11

## 2025-08-12 ASSESSMENT — PAIN SCALES - GENERAL
PAINLEVEL_OUTOF10: 7
PAINLEVEL_OUTOF10: 0
PAINLEVEL_OUTOF10: 4
PAINLEVEL_OUTOF10: 7
PAINLEVEL_OUTOF10: 7

## 2025-08-12 ASSESSMENT — PAIN DESCRIPTION - LOCATION
LOCATION: BACK

## 2025-08-12 ASSESSMENT — PAIN DESCRIPTION - DIRECTION
RADIATING_TOWARDS: NO
RADIATING_TOWARDS: NO

## 2025-08-12 ASSESSMENT — PAIN DESCRIPTION - DESCRIPTORS
DESCRIPTORS: SHOOTING
DESCRIPTORS: ACHING
DESCRIPTORS: SHOOTING

## 2025-08-12 ASSESSMENT — PAIN DESCRIPTION - FREQUENCY
FREQUENCY: INTERMITTENT

## 2025-08-12 ASSESSMENT — PAIN DESCRIPTION - ONSET
ONSET: GRADUAL

## 2025-08-12 ASSESSMENT — PAIN - FUNCTIONAL ASSESSMENT
PAIN_FUNCTIONAL_ASSESSMENT: 0-10
PAIN_FUNCTIONAL_ASSESSMENT: 0-10
PAIN_FUNCTIONAL_ASSESSMENT: ACTIVITIES ARE NOT PREVENTED
PAIN_FUNCTIONAL_ASSESSMENT: 0-10
PAIN_FUNCTIONAL_ASSESSMENT: PREVENTS OR INTERFERES SOME ACTIVE ACTIVITIES AND ADLS
PAIN_FUNCTIONAL_ASSESSMENT: ACTIVITIES ARE NOT PREVENTED

## 2025-08-12 ASSESSMENT — PAIN DESCRIPTION - ORIENTATION
ORIENTATION: LOWER
ORIENTATION: LOWER
ORIENTATION: RIGHT;LEFT

## 2025-08-12 ASSESSMENT — PAIN DESCRIPTION - PAIN TYPE
TYPE: SURGICAL PAIN

## 2025-08-13 PROBLEM — M48.061 SPINAL STENOSIS AT L4-L5 LEVEL: Status: ACTIVE | Noted: 2025-08-13

## 2025-08-13 LAB
ANION GAP SERPL CALC-SCNC: 10 MMOL/L (ref 3–18)
APPEARANCE UR: CLEAR
BACTERIA URNS QL MICRO: ABNORMAL /HPF
BILIRUB UR QL: NEGATIVE
BUN SERPL-MCNC: 18 MG/DL (ref 6–23)
BUN/CREAT SERPL: 21 (ref 12–20)
CALCIUM SERPL-MCNC: 8.6 MG/DL (ref 8.5–10.1)
CHLORIDE SERPL-SCNC: 109 MMOL/L (ref 98–107)
CO2 SERPL-SCNC: 23 MMOL/L (ref 21–32)
COLOR UR: ABNORMAL
CREAT SERPL-MCNC: 0.88 MG/DL (ref 0.6–1.3)
EPITH CASTS URNS QL MICRO: ABNORMAL /LPF (ref 0–5)
ERYTHROCYTE [DISTWIDTH] IN BLOOD BY AUTOMATED COUNT: 12.4 % (ref 11.6–14.5)
GLUCOSE BLD STRIP.AUTO-MCNC: 113 MG/DL (ref 70–110)
GLUCOSE BLD STRIP.AUTO-MCNC: 118 MG/DL (ref 70–110)
GLUCOSE BLD STRIP.AUTO-MCNC: 139 MG/DL (ref 70–110)
GLUCOSE SERPL-MCNC: 105 MG/DL (ref 74–108)
GLUCOSE UR STRIP.AUTO-MCNC: NEGATIVE MG/DL
HCT VFR BLD AUTO: 38.1 % (ref 35–45)
HGB BLD-MCNC: 12.6 G/DL (ref 12–16)
HGB UR QL STRIP: NEGATIVE
KETONES UR QL STRIP.AUTO: NEGATIVE MG/DL
LEUKOCYTE ESTERASE UR QL STRIP.AUTO: ABNORMAL
MAGNESIUM SERPL-MCNC: 2 MG/DL (ref 1.6–2.6)
MCH RBC QN AUTO: 31.2 PG (ref 24–34)
MCHC RBC AUTO-ENTMCNC: 33.1 G/DL (ref 31–37)
MCV RBC AUTO: 94.3 FL (ref 78–100)
NITRITE UR QL STRIP.AUTO: POSITIVE
NRBC # BLD: 0 K/UL (ref 0–0.01)
NRBC BLD-RTO: 0 PER 100 WBC
PH UR STRIP: 6 (ref 5–8)
PLATELET # BLD AUTO: 205 K/UL (ref 135–420)
PMV BLD AUTO: 11.4 FL (ref 9.2–11.8)
POTASSIUM SERPL-SCNC: 3.4 MMOL/L (ref 3.5–5.5)
PROT UR STRIP-MCNC: NEGATIVE MG/DL
RBC # BLD AUTO: 4.04 M/UL (ref 4.2–5.3)
RBC #/AREA URNS HPF: NEGATIVE /HPF (ref 0–5)
SODIUM SERPL-SCNC: 141 MMOL/L (ref 136–145)
SP GR UR REFRACTOMETRY: 1.02 (ref 1–1.03)
UROBILINOGEN UR QL STRIP.AUTO: 1 EU/DL (ref 0.2–1)
WBC # BLD AUTO: 11.4 K/UL (ref 4.6–13.2)
WBC URNS QL MICRO: ABNORMAL /HPF (ref 0–5)

## 2025-08-13 PROCEDURE — G0378 HOSPITAL OBSERVATION PER HR: HCPCS

## 2025-08-13 PROCEDURE — 94761 N-INVAS EAR/PLS OXIMETRY MLT: CPT

## 2025-08-13 PROCEDURE — 2500000003 HC RX 250 WO HCPCS: Performed by: ORTHOPAEDIC SURGERY

## 2025-08-13 PROCEDURE — 81001 URINALYSIS AUTO W/SCOPE: CPT

## 2025-08-13 PROCEDURE — 6370000000 HC RX 637 (ALT 250 FOR IP)

## 2025-08-13 PROCEDURE — 97530 THERAPEUTIC ACTIVITIES: CPT

## 2025-08-13 PROCEDURE — 6360000002 HC RX W HCPCS: Performed by: ORTHOPAEDIC SURGERY

## 2025-08-13 PROCEDURE — 1100000000 HC RM PRIVATE

## 2025-08-13 PROCEDURE — 2580000003 HC RX 258: Performed by: ORTHOPAEDIC SURGERY

## 2025-08-13 PROCEDURE — 36415 COLL VENOUS BLD VENIPUNCTURE: CPT

## 2025-08-13 PROCEDURE — 85027 COMPLETE CBC AUTOMATED: CPT

## 2025-08-13 PROCEDURE — 82962 GLUCOSE BLOOD TEST: CPT

## 2025-08-13 PROCEDURE — 80048 BASIC METABOLIC PNL TOTAL CA: CPT

## 2025-08-13 PROCEDURE — 96376 TX/PRO/DX INJ SAME DRUG ADON: CPT

## 2025-08-13 PROCEDURE — 83735 ASSAY OF MAGNESIUM: CPT

## 2025-08-13 PROCEDURE — 97535 SELF CARE MNGMENT TRAINING: CPT

## 2025-08-13 PROCEDURE — 6370000000 HC RX 637 (ALT 250 FOR IP): Performed by: ORTHOPAEDIC SURGERY

## 2025-08-13 RX ADMIN — FAMOTIDINE 20 MG: 20 TABLET, FILM COATED ORAL at 20:17

## 2025-08-13 RX ADMIN — HYDROMORPHONE HYDROCHLORIDE 0.5 MG: 2 INJECTION, SOLUTION INTRAMUSCULAR; INTRAVENOUS; SUBCUTANEOUS at 06:48

## 2025-08-13 RX ADMIN — ASPIRIN 81 MG CHEWABLE TABLET 81 MG: 81 TABLET CHEWABLE at 09:40

## 2025-08-13 RX ADMIN — ATORVASTATIN CALCIUM 80 MG: 40 TABLET, FILM COATED ORAL at 20:16

## 2025-08-13 RX ADMIN — BISACODYL 5 MG: 5 TABLET, COATED ORAL at 09:40

## 2025-08-13 RX ADMIN — ZOLPIDEM TARTRATE 10 MG: 5 TABLET ORAL at 20:16

## 2025-08-13 RX ADMIN — CARVEDILOL 12.5 MG: 12.5 TABLET, FILM COATED ORAL at 20:16

## 2025-08-13 RX ADMIN — SODIUM CHLORIDE: 0.45 INJECTION, SOLUTION INTRAVENOUS at 08:12

## 2025-08-13 RX ADMIN — OXYCODONE HYDROCHLORIDE 10 MG: 5 TABLET ORAL at 20:14

## 2025-08-13 RX ADMIN — POLYETHYLENE GLYCOL 3350 17 G: 17 POWDER, FOR SOLUTION ORAL at 09:30

## 2025-08-13 RX ADMIN — FAMOTIDINE 20 MG: 20 TABLET, FILM COATED ORAL at 09:40

## 2025-08-13 RX ADMIN — SODIUM CHLORIDE, PRESERVATIVE FREE 10 ML: 5 INJECTION INTRAVENOUS at 09:34

## 2025-08-13 RX ADMIN — PHENAZOPYRIDINE 100 MG: 100 TABLET ORAL at 12:36

## 2025-08-13 RX ADMIN — SODIUM CHLORIDE, PRESERVATIVE FREE 10 ML: 5 INJECTION INTRAVENOUS at 20:26

## 2025-08-13 RX ADMIN — HYDROMORPHONE HYDROCHLORIDE 0.5 MG: 2 INJECTION, SOLUTION INTRAMUSCULAR; INTRAVENOUS; SUBCUTANEOUS at 12:36

## 2025-08-13 RX ADMIN — ACETAMINOPHEN 650 MG: 325 TABLET ORAL at 20:16

## 2025-08-13 RX ADMIN — SACUBITRIL AND VALSARTAN 1 TABLET: 24; 26 TABLET, FILM COATED ORAL at 20:16

## 2025-08-13 ASSESSMENT — PAIN SCALES - GENERAL
PAINLEVEL_OUTOF10: 2
PAINLEVEL_OUTOF10: 9
PAINLEVEL_OUTOF10: 0
PAINLEVEL_OUTOF10: 2
PAINLEVEL_OUTOF10: 0
PAINLEVEL_OUTOF10: 7
PAINLEVEL_OUTOF10: 7
PAINLEVEL_OUTOF10: 2
PAINLEVEL_OUTOF10: 0

## 2025-08-13 ASSESSMENT — PAIN DESCRIPTION - DESCRIPTORS
DESCRIPTORS: ACHING
DESCRIPTORS: STABBING;ACHING
DESCRIPTORS: STABBING;ACHING
DESCRIPTORS: ACHING

## 2025-08-13 ASSESSMENT — PAIN - FUNCTIONAL ASSESSMENT
PAIN_FUNCTIONAL_ASSESSMENT: ACTIVITIES ARE NOT PREVENTED
PAIN_FUNCTIONAL_ASSESSMENT: 0-10
PAIN_FUNCTIONAL_ASSESSMENT: 0-10
PAIN_FUNCTIONAL_ASSESSMENT: ACTIVITIES ARE NOT PREVENTED
PAIN_FUNCTIONAL_ASSESSMENT: ACTIVITIES ARE NOT PREVENTED
PAIN_FUNCTIONAL_ASSESSMENT: 0-10
PAIN_FUNCTIONAL_ASSESSMENT: ACTIVITIES ARE NOT PREVENTED
PAIN_FUNCTIONAL_ASSESSMENT: 0-10
PAIN_FUNCTIONAL_ASSESSMENT: 0-10

## 2025-08-13 ASSESSMENT — PAIN DESCRIPTION - PAIN TYPE
TYPE: SURGICAL PAIN

## 2025-08-13 ASSESSMENT — PAIN DESCRIPTION - ONSET
ONSET: GRADUAL

## 2025-08-13 ASSESSMENT — PAIN DESCRIPTION - LOCATION
LOCATION: BACK
LOCATION: BACK
LOCATION: BACK;ABDOMEN
LOCATION: BACK

## 2025-08-13 ASSESSMENT — PAIN DESCRIPTION - ORIENTATION
ORIENTATION: LOWER;RIGHT;LEFT
ORIENTATION: ANTERIOR;POSTERIOR
ORIENTATION: LOWER;RIGHT;LEFT
ORIENTATION: LOWER

## 2025-08-13 ASSESSMENT — PAIN DESCRIPTION - FREQUENCY
FREQUENCY: INTERMITTENT

## 2025-08-14 LAB — GLUCOSE BLD STRIP.AUTO-MCNC: 92 MG/DL (ref 70–110)

## 2025-08-14 PROCEDURE — 6370000000 HC RX 637 (ALT 250 FOR IP): Performed by: FAMILY MEDICINE

## 2025-08-14 PROCEDURE — 2580000003 HC RX 258: Performed by: ORTHOPAEDIC SURGERY

## 2025-08-14 PROCEDURE — 97535 SELF CARE MNGMENT TRAINING: CPT

## 2025-08-14 PROCEDURE — 1100000003 HC PRIVATE W/ TELEMETRY

## 2025-08-14 PROCEDURE — 6370000000 HC RX 637 (ALT 250 FOR IP)

## 2025-08-14 PROCEDURE — 99231 SBSQ HOSP IP/OBS SF/LOW 25: CPT | Performed by: FAMILY MEDICINE

## 2025-08-14 PROCEDURE — 82962 GLUCOSE BLOOD TEST: CPT

## 2025-08-14 PROCEDURE — 94761 N-INVAS EAR/PLS OXIMETRY MLT: CPT

## 2025-08-14 PROCEDURE — 6370000000 HC RX 637 (ALT 250 FOR IP): Performed by: ORTHOPAEDIC SURGERY

## 2025-08-14 PROCEDURE — 2500000003 HC RX 250 WO HCPCS: Performed by: ORTHOPAEDIC SURGERY

## 2025-08-14 PROCEDURE — 97530 THERAPEUTIC ACTIVITIES: CPT

## 2025-08-14 PROCEDURE — 97116 GAIT TRAINING THERAPY: CPT

## 2025-08-14 RX ORDER — HYDROCODONE BITARTRATE AND ACETAMINOPHEN 5; 325 MG/1; MG/1
1 TABLET ORAL EVERY 6 HOURS PRN
Status: DISCONTINUED | OUTPATIENT
Start: 2025-08-14 | End: 2025-08-14

## 2025-08-14 RX ORDER — OXYCODONE HYDROCHLORIDE 5 MG/1
5 TABLET ORAL EVERY 4 HOURS PRN
Status: DISCONTINUED | OUTPATIENT
Start: 2025-08-14 | End: 2025-08-15

## 2025-08-14 RX ORDER — CLOPIDOGREL BISULFATE 75 MG/1
75 TABLET ORAL DAILY
Status: DISCONTINUED | OUTPATIENT
Start: 2025-08-14 | End: 2025-08-16 | Stop reason: HOSPADM

## 2025-08-14 RX ADMIN — BISACODYL 5 MG: 5 TABLET, COATED ORAL at 07:55

## 2025-08-14 RX ADMIN — SACUBITRIL AND VALSARTAN 1 TABLET: 24; 26 TABLET, FILM COATED ORAL at 20:31

## 2025-08-14 RX ADMIN — SODIUM CHLORIDE, PRESERVATIVE FREE 10 ML: 5 INJECTION INTRAVENOUS at 08:02

## 2025-08-14 RX ADMIN — ZOLPIDEM TARTRATE 10 MG: 5 TABLET ORAL at 20:31

## 2025-08-14 RX ADMIN — FAMOTIDINE 20 MG: 20 TABLET, FILM COATED ORAL at 07:55

## 2025-08-14 RX ADMIN — CARVEDILOL 12.5 MG: 12.5 TABLET, FILM COATED ORAL at 20:31

## 2025-08-14 RX ADMIN — OXYCODONE HYDROCHLORIDE 5 MG: 5 TABLET ORAL at 17:49

## 2025-08-14 RX ADMIN — ACETAMINOPHEN 650 MG: 325 TABLET ORAL at 14:34

## 2025-08-14 RX ADMIN — FAMOTIDINE 20 MG: 20 TABLET, FILM COATED ORAL at 20:31

## 2025-08-14 RX ADMIN — POLYETHYLENE GLYCOL 3350 17 G: 17 POWDER, FOR SOLUTION ORAL at 07:56

## 2025-08-14 RX ADMIN — CLOPIDOGREL BISULFATE 75 MG: 75 TABLET, FILM COATED ORAL at 07:55

## 2025-08-14 RX ADMIN — METAXALONE 800 MG: 800 TABLET ORAL at 03:58

## 2025-08-14 RX ADMIN — CARVEDILOL 12.5 MG: 12.5 TABLET, FILM COATED ORAL at 07:55

## 2025-08-14 RX ADMIN — HYDROCODONE BITARTRATE AND ACETAMINOPHEN 1 TABLET: 5; 325 TABLET ORAL at 16:58

## 2025-08-14 RX ADMIN — ATORVASTATIN CALCIUM 80 MG: 40 TABLET, FILM COATED ORAL at 20:31

## 2025-08-14 RX ADMIN — ASPIRIN 81 MG CHEWABLE TABLET 81 MG: 81 TABLET CHEWABLE at 07:55

## 2025-08-14 RX ADMIN — OXYCODONE HYDROCHLORIDE 10 MG: 5 TABLET ORAL at 02:11

## 2025-08-14 RX ADMIN — SODIUM CHLORIDE: 0.45 INJECTION, SOLUTION INTRAVENOUS at 04:05

## 2025-08-14 RX ADMIN — SACUBITRIL AND VALSARTAN 1 TABLET: 24; 26 TABLET, FILM COATED ORAL at 07:55

## 2025-08-14 RX ADMIN — ACETAMINOPHEN 650 MG: 325 TABLET ORAL at 20:32

## 2025-08-14 RX ADMIN — ACETAMINOPHEN 650 MG: 325 TABLET ORAL at 07:56

## 2025-08-14 ASSESSMENT — PAIN SCALES - GENERAL
PAINLEVEL_OUTOF10: 0
PAINLEVEL_OUTOF10: 2
PAINLEVEL_OUTOF10: 2
PAINLEVEL_OUTOF10: 6
PAINLEVEL_OUTOF10: 5
PAINLEVEL_OUTOF10: 6
PAINLEVEL_OUTOF10: 0
PAINLEVEL_OUTOF10: 7
PAINLEVEL_OUTOF10: 7
PAINLEVEL_OUTOF10: 0
PAINLEVEL_OUTOF10: 2
PAINLEVEL_OUTOF10: 7
PAINLEVEL_OUTOF10: 2
PAINLEVEL_OUTOF10: 7
PAINLEVEL_OUTOF10: 7

## 2025-08-14 ASSESSMENT — PAIN DESCRIPTION - PAIN TYPE
TYPE: ACUTE PAIN

## 2025-08-14 ASSESSMENT — PAIN DESCRIPTION - ORIENTATION
ORIENTATION: LEFT
ORIENTATION: LOWER
ORIENTATION: LOWER
ORIENTATION: RIGHT;LEFT
ORIENTATION: LEFT
ORIENTATION: RIGHT;LEFT
ORIENTATION: LEFT
ORIENTATION: LEFT
ORIENTATION: RIGHT;LEFT

## 2025-08-14 ASSESSMENT — PAIN - FUNCTIONAL ASSESSMENT
PAIN_FUNCTIONAL_ASSESSMENT: 0-10
PAIN_FUNCTIONAL_ASSESSMENT: 0-10
PAIN_FUNCTIONAL_ASSESSMENT: ACTIVITIES ARE NOT PREVENTED
PAIN_FUNCTIONAL_ASSESSMENT: 0-10
PAIN_FUNCTIONAL_ASSESSMENT: ACTIVITIES ARE NOT PREVENTED
PAIN_FUNCTIONAL_ASSESSMENT: ACTIVITIES ARE NOT PREVENTED
PAIN_FUNCTIONAL_ASSESSMENT: 0-10
PAIN_FUNCTIONAL_ASSESSMENT: ACTIVITIES ARE NOT PREVENTED
PAIN_FUNCTIONAL_ASSESSMENT: 0-10
PAIN_FUNCTIONAL_ASSESSMENT: ACTIVITIES ARE NOT PREVENTED

## 2025-08-14 ASSESSMENT — PAIN DESCRIPTION - DESCRIPTORS
DESCRIPTORS: ACHING
DESCRIPTORS: ACHING;SHARP
DESCRIPTORS: ACHING
DESCRIPTORS: ACHING;STABBING
DESCRIPTORS: ACHING;SHARP

## 2025-08-14 ASSESSMENT — PAIN DESCRIPTION - FREQUENCY
FREQUENCY: INTERMITTENT

## 2025-08-14 ASSESSMENT — PAIN DESCRIPTION - ONSET
ONSET: GRADUAL

## 2025-08-14 ASSESSMENT — PAIN DESCRIPTION - LOCATION
LOCATION: BACK;FLANK
LOCATION: BUTTOCKS
LOCATION: FLANK
LOCATION: BUTTOCKS
LOCATION: FLANK
LOCATION: BUTTOCKS
LOCATION: BACK
LOCATION: HEAD
LOCATION: BUTTOCKS
LOCATION: HEAD
LOCATION: BACK

## 2025-08-15 PROCEDURE — 2500000003 HC RX 250 WO HCPCS: Performed by: ORTHOPAEDIC SURGERY

## 2025-08-15 PROCEDURE — 6370000000 HC RX 637 (ALT 250 FOR IP): Performed by: ORTHOPAEDIC SURGERY

## 2025-08-15 PROCEDURE — 1100000003 HC PRIVATE W/ TELEMETRY

## 2025-08-15 PROCEDURE — 94761 N-INVAS EAR/PLS OXIMETRY MLT: CPT

## 2025-08-15 PROCEDURE — 97116 GAIT TRAINING THERAPY: CPT

## 2025-08-15 PROCEDURE — 99231 SBSQ HOSP IP/OBS SF/LOW 25: CPT | Performed by: FAMILY MEDICINE

## 2025-08-15 PROCEDURE — 6370000000 HC RX 637 (ALT 250 FOR IP): Performed by: FAMILY MEDICINE

## 2025-08-15 PROCEDURE — 97535 SELF CARE MNGMENT TRAINING: CPT

## 2025-08-15 PROCEDURE — 97530 THERAPEUTIC ACTIVITIES: CPT

## 2025-08-15 PROCEDURE — 6370000000 HC RX 637 (ALT 250 FOR IP)

## 2025-08-15 RX ORDER — OXYCODONE HYDROCHLORIDE 5 MG/1
10 TABLET ORAL EVERY 4 HOURS PRN
Refills: 0 | Status: DISCONTINUED | OUTPATIENT
Start: 2025-08-15 | End: 2025-08-16 | Stop reason: HOSPADM

## 2025-08-15 RX ADMIN — CARVEDILOL 12.5 MG: 12.5 TABLET, FILM COATED ORAL at 19:44

## 2025-08-15 RX ADMIN — POLYETHYLENE GLYCOL 3350 17 G: 17 POWDER, FOR SOLUTION ORAL at 10:17

## 2025-08-15 RX ADMIN — CARVEDILOL 12.5 MG: 12.5 TABLET, FILM COATED ORAL at 10:16

## 2025-08-15 RX ADMIN — SACUBITRIL AND VALSARTAN 1 TABLET: 24; 26 TABLET, FILM COATED ORAL at 10:16

## 2025-08-15 RX ADMIN — SODIUM CHLORIDE, PRESERVATIVE FREE 10 ML: 5 INJECTION INTRAVENOUS at 10:17

## 2025-08-15 RX ADMIN — OXYCODONE HYDROCHLORIDE 5 MG: 5 TABLET ORAL at 05:28

## 2025-08-15 RX ADMIN — FAMOTIDINE 20 MG: 20 TABLET, FILM COATED ORAL at 10:17

## 2025-08-15 RX ADMIN — ASPIRIN 81 MG CHEWABLE TABLET 81 MG: 81 TABLET CHEWABLE at 10:17

## 2025-08-15 RX ADMIN — SACUBITRIL AND VALSARTAN 1 TABLET: 24; 26 TABLET, FILM COATED ORAL at 19:44

## 2025-08-15 RX ADMIN — ACETAMINOPHEN 650 MG: 325 TABLET ORAL at 19:43

## 2025-08-15 RX ADMIN — BISACODYL 5 MG: 5 TABLET, COATED ORAL at 10:17

## 2025-08-15 RX ADMIN — ZOLPIDEM TARTRATE 10 MG: 5 TABLET ORAL at 19:43

## 2025-08-15 RX ADMIN — SODIUM CHLORIDE, PRESERVATIVE FREE 10 ML: 5 INJECTION INTRAVENOUS at 04:14

## 2025-08-15 RX ADMIN — ACETAMINOPHEN 650 MG: 325 TABLET ORAL at 10:17

## 2025-08-15 RX ADMIN — ATORVASTATIN CALCIUM 80 MG: 40 TABLET, FILM COATED ORAL at 19:43

## 2025-08-15 RX ADMIN — CLOPIDOGREL BISULFATE 75 MG: 75 TABLET, FILM COATED ORAL at 10:16

## 2025-08-15 RX ADMIN — OXYCODONE HYDROCHLORIDE 5 MG: 5 TABLET ORAL at 10:17

## 2025-08-15 RX ADMIN — FAMOTIDINE 20 MG: 20 TABLET, FILM COATED ORAL at 19:44

## 2025-08-15 ASSESSMENT — PAIN DESCRIPTION - DESCRIPTORS
DESCRIPTORS: DISCOMFORT
DESCRIPTORS: DISCOMFORT
DESCRIPTORS: ACHING

## 2025-08-15 ASSESSMENT — PAIN DESCRIPTION - ONSET
ONSET: ON-GOING
ONSET: ON-GOING
ONSET: GRADUAL

## 2025-08-15 ASSESSMENT — PAIN DESCRIPTION - LOCATION
LOCATION: BACK
LOCATION: HEAD
LOCATION: LEG
LOCATION: LEG

## 2025-08-15 ASSESSMENT — PAIN SCALES - GENERAL
PAINLEVEL_OUTOF10: 0
PAINLEVEL_OUTOF10: 5
PAINLEVEL_OUTOF10: 3
PAINLEVEL_OUTOF10: 0
PAINLEVEL_OUTOF10: 0
PAINLEVEL_OUTOF10: 7

## 2025-08-15 ASSESSMENT — PAIN DESCRIPTION - PAIN TYPE
TYPE: SURGICAL PAIN
TYPE: ACUTE PAIN

## 2025-08-15 ASSESSMENT — PAIN - FUNCTIONAL ASSESSMENT
PAIN_FUNCTIONAL_ASSESSMENT: 0-10
PAIN_FUNCTIONAL_ASSESSMENT: 0-10
PAIN_FUNCTIONAL_ASSESSMENT: ACTIVITIES ARE NOT PREVENTED

## 2025-08-15 ASSESSMENT — PAIN DESCRIPTION - FREQUENCY
FREQUENCY: INTERMITTENT

## 2025-08-15 ASSESSMENT — PAIN DESCRIPTION - ORIENTATION
ORIENTATION: LEFT
ORIENTATION: LEFT
ORIENTATION: LOWER

## 2025-08-16 VITALS
DIASTOLIC BLOOD PRESSURE: 91 MMHG | WEIGHT: 184.8 LBS | OXYGEN SATURATION: 95 % | BODY MASS INDEX: 29.7 KG/M2 | SYSTOLIC BLOOD PRESSURE: 160 MMHG | HEIGHT: 66 IN | HEART RATE: 82 BPM | RESPIRATION RATE: 16 BRPM | TEMPERATURE: 97.8 F

## 2025-08-16 LAB — GLUCOSE BLD STRIP.AUTO-MCNC: 100 MG/DL (ref 70–110)

## 2025-08-16 PROCEDURE — 99231 SBSQ HOSP IP/OBS SF/LOW 25: CPT | Performed by: HOSPITALIST

## 2025-08-16 PROCEDURE — 6370000000 HC RX 637 (ALT 250 FOR IP)

## 2025-08-16 PROCEDURE — 6370000000 HC RX 637 (ALT 250 FOR IP): Performed by: ORTHOPAEDIC SURGERY

## 2025-08-16 PROCEDURE — 6370000000 HC RX 637 (ALT 250 FOR IP): Performed by: FAMILY MEDICINE

## 2025-08-16 PROCEDURE — 82962 GLUCOSE BLOOD TEST: CPT

## 2025-08-16 RX ADMIN — FAMOTIDINE 20 MG: 20 TABLET, FILM COATED ORAL at 10:33

## 2025-08-16 RX ADMIN — OXYCODONE HYDROCHLORIDE 10 MG: 5 TABLET ORAL at 00:47

## 2025-08-16 RX ADMIN — SACUBITRIL AND VALSARTAN 1 TABLET: 24; 26 TABLET, FILM COATED ORAL at 10:33

## 2025-08-16 RX ADMIN — OXYCODONE HYDROCHLORIDE 10 MG: 5 TABLET ORAL at 10:32

## 2025-08-16 RX ADMIN — ACETAMINOPHEN 650 MG: 325 TABLET ORAL at 10:33

## 2025-08-16 RX ADMIN — ASPIRIN 81 MG CHEWABLE TABLET 81 MG: 81 TABLET CHEWABLE at 10:34

## 2025-08-16 RX ADMIN — CARVEDILOL 12.5 MG: 12.5 TABLET, FILM COATED ORAL at 10:33

## 2025-08-16 RX ADMIN — CLOPIDOGREL BISULFATE 75 MG: 75 TABLET, FILM COATED ORAL at 10:34

## 2025-08-16 ASSESSMENT — PAIN DESCRIPTION - ONSET
ONSET: ON-GOING
ONSET: ON-GOING

## 2025-08-16 ASSESSMENT — PAIN - FUNCTIONAL ASSESSMENT
PAIN_FUNCTIONAL_ASSESSMENT: 0-10
PAIN_FUNCTIONAL_ASSESSMENT: 0-10
PAIN_FUNCTIONAL_ASSESSMENT: ACTIVITIES ARE NOT PREVENTED
PAIN_FUNCTIONAL_ASSESSMENT: 0-10

## 2025-08-16 ASSESSMENT — PAIN DESCRIPTION - DESCRIPTORS
DESCRIPTORS: DISCOMFORT
DESCRIPTORS: ACHING
DESCRIPTORS: DISCOMFORT
DESCRIPTORS: ACHING

## 2025-08-16 ASSESSMENT — PAIN DESCRIPTION - ORIENTATION
ORIENTATION: LEFT
ORIENTATION: LEFT
ORIENTATION: POSTERIOR
ORIENTATION: POSTERIOR

## 2025-08-16 ASSESSMENT — PAIN DESCRIPTION - LOCATION
LOCATION: LEG
LOCATION: BACK
LOCATION: LEG
LOCATION: BACK

## 2025-08-16 ASSESSMENT — PAIN DESCRIPTION - FREQUENCY
FREQUENCY: INTERMITTENT
FREQUENCY: INTERMITTENT

## 2025-08-16 ASSESSMENT — PAIN SCALES - GENERAL
PAINLEVEL_OUTOF10: 4
PAINLEVEL_OUTOF10: 0
PAINLEVEL_OUTOF10: 7
PAINLEVEL_OUTOF10: 4
PAINLEVEL_OUTOF10: 0
PAINLEVEL_OUTOF10: 0

## 2025-08-29 ENCOUNTER — APPOINTMENT (OUTPATIENT)
Facility: HOSPITAL | Age: 70
DRG: 176 | End: 2025-08-29
Attending: HOSPITALIST
Payer: MEDICARE

## 2025-08-29 ENCOUNTER — HOSPITAL ENCOUNTER (INPATIENT)
Facility: HOSPITAL | Age: 70
LOS: 2 days | Discharge: NURSING, REHAB OR LONG-TERM CARE | DRG: 176 | End: 2025-08-31
Attending: EMERGENCY MEDICINE | Admitting: HOSPITALIST
Payer: MEDICARE

## 2025-08-29 ENCOUNTER — APPOINTMENT (OUTPATIENT)
Facility: HOSPITAL | Age: 70
DRG: 176 | End: 2025-08-29
Payer: MEDICARE

## 2025-08-29 ENCOUNTER — APPOINTMENT (OUTPATIENT)
Facility: HOSPITAL | Age: 70
DRG: 176 | End: 2025-08-29
Attending: EMERGENCY MEDICINE
Payer: MEDICARE

## 2025-08-29 DIAGNOSIS — R94.31 ABNORMAL ELECTROCARDIOGRAPHY: ICD-10-CM

## 2025-08-29 DIAGNOSIS — R04.0 EPISTAXIS: ICD-10-CM

## 2025-08-29 DIAGNOSIS — Z98.1 S/P LUMBAR SPINAL FUSION: ICD-10-CM

## 2025-08-29 DIAGNOSIS — R07.89 ATYPICAL CHEST PAIN: ICD-10-CM

## 2025-08-29 DIAGNOSIS — R07.9 CHEST PAIN, UNSPECIFIED TYPE: ICD-10-CM

## 2025-08-29 DIAGNOSIS — G47.00 INSOMNIA, UNSPECIFIED TYPE: ICD-10-CM

## 2025-08-29 DIAGNOSIS — I26.93 SINGLE SUBSEGMENTAL PULMONARY EMBOLISM WITHOUT ACUTE COR PULMONALE (HCC): ICD-10-CM

## 2025-08-29 PROBLEM — I20.0 UNSTABLE ANGINA (HCC): Status: ACTIVE | Noted: 2025-08-29

## 2025-08-29 LAB
ALBUMIN SERPL-MCNC: 3.1 G/DL (ref 3.4–5)
ALBUMIN/GLOB SERPL: 1 (ref 0.8–1.7)
ALP SERPL-CCNC: 136 U/L (ref 45–117)
ALT SERPL-CCNC: 13 U/L (ref 10–35)
AMPHET UR QL SCN: NEGATIVE
ANION GAP SERPL CALC-SCNC: 10 MMOL/L (ref 3–18)
APTT PPP: 30.7 SEC (ref 21.7–34.2)
APTT PPP: >180 SEC (ref 21.7–34.2)
AST SERPL-CCNC: 22 U/L (ref 10–38)
BARBITURATES UR QL SCN: NEGATIVE
BASOPHILS # BLD: 0.06 K/UL (ref 0–0.1)
BASOPHILS NFR BLD: 0.7 % (ref 0–2)
BENZODIAZ UR QL: POSITIVE
BILIRUB SERPL-MCNC: 0.4 MG/DL (ref 0.2–1)
BUN SERPL-MCNC: 23 MG/DL (ref 6–23)
BUN/CREAT SERPL: 22 (ref 12–20)
CALCIUM SERPL-MCNC: 9.1 MG/DL (ref 8.5–10.1)
CANNABINOIDS UR QL SCN: NEGATIVE
CHLORIDE SERPL-SCNC: 106 MMOL/L (ref 98–107)
CO2 SERPL-SCNC: 24 MMOL/L (ref 21–32)
COCAINE UR QL SCN: NEGATIVE
CREAT SERPL-MCNC: 1.04 MG/DL (ref 0.6–1.3)
DIFFERENTIAL METHOD BLD: ABNORMAL
ECHO AO ASC DIAM: 3.6 CM
ECHO AO ASCENDING AORTA INDEX: 1.91 CM/M2
ECHO AO ROOT DIAM: 3.4 CM
ECHO AO ROOT INDEX: 1.81 CM/M2
ECHO AR MAX VEL PISA: 3.9 M/S
ECHO AV AREA PEAK VELOCITY: 1.7 CM2
ECHO AV AREA VTI: 1.7 CM2
ECHO AV AREA/BSA PEAK VELOCITY: 0.9 CM2/M2
ECHO AV AREA/BSA VTI: 0.9 CM2/M2
ECHO AV MEAN GRADIENT: 11 MMHG
ECHO AV MEAN VELOCITY: 1.6 M/S
ECHO AV PEAK GRADIENT: 19 MMHG
ECHO AV PEAK VELOCITY: 2.2 M/S
ECHO AV REGURGITANT PHT: 620.9 MS
ECHO AV VELOCITY RATIO: 0.41
ECHO AV VTI: 44.7 CM
ECHO BSA: 1.91 M2
ECHO BSA: 1.91 M2
ECHO EST RA PRESSURE: 3 MMHG
ECHO LA DIAMETER INDEX: 1.7 CM/M2
ECHO LA DIAMETER: 3.2 CM
ECHO LA TO AORTIC ROOT RATIO: 0.94
ECHO LA VOL A-L A2C: 58 ML (ref 22–52)
ECHO LA VOL A-L A4C: 47 ML (ref 22–52)
ECHO LA VOL BP: 51 ML (ref 22–52)
ECHO LA VOL MOD A2C: 55 ML (ref 22–52)
ECHO LA VOL MOD A4C: 46 ML (ref 22–52)
ECHO LA VOL/BSA BIPLANE: 27 ML/M2 (ref 16–34)
ECHO LA VOLUME AREA LENGTH: 55 ML
ECHO LA VOLUME INDEX A-L A2C: 31 ML/M2 (ref 16–34)
ECHO LA VOLUME INDEX A-L A4C: 25 ML/M2 (ref 16–34)
ECHO LA VOLUME INDEX AREA LENGTH: 29 ML/M2 (ref 16–34)
ECHO LA VOLUME INDEX MOD A2C: 29 ML/M2 (ref 16–34)
ECHO LA VOLUME INDEX MOD A4C: 24 ML/M2 (ref 16–34)
ECHO LV E' LATERAL VELOCITY: 3.59 CM/S
ECHO LV E' SEPTAL VELOCITY: 4.93 CM/S
ECHO LV EDV A2C: 129 ML
ECHO LV EDV A4C: 112 ML
ECHO LV EDV BP: 123 ML (ref 56–104)
ECHO LV EDV INDEX A4C: 60 ML/M2
ECHO LV EDV INDEX BP: 65 ML/M2
ECHO LV EDV NDEX A2C: 69 ML/M2
ECHO LV EF PHYSICIAN: 45 %
ECHO LV EJECTION FRACTION A2C: 46 %
ECHO LV EJECTION FRACTION A4C: 39 %
ECHO LV EJECTION FRACTION BIPLANE: 42 % (ref 55–100)
ECHO LV ESV A2C: 70 ML
ECHO LV ESV A4C: 68 ML
ECHO LV ESV BP: 71 ML (ref 19–49)
ECHO LV ESV INDEX A2C: 37 ML/M2
ECHO LV ESV INDEX A4C: 36 ML/M2
ECHO LV ESV INDEX BP: 38 ML/M2
ECHO LV FRACTIONAL SHORTENING: 42 % (ref 28–44)
ECHO LV INTERNAL DIMENSION DIASTOLE INDEX: 2.66 CM/M2
ECHO LV INTERNAL DIMENSION DIASTOLIC: 5 CM (ref 3.9–5.3)
ECHO LV INTERNAL DIMENSION SYSTOLIC INDEX: 1.54 CM/M2
ECHO LV INTERNAL DIMENSION SYSTOLIC: 2.9 CM
ECHO LV IVSD: 1.1 CM (ref 0.6–0.9)
ECHO LV MASS 2D: 220.3 G (ref 67–162)
ECHO LV MASS INDEX 2D: 117.2 G/M2 (ref 43–95)
ECHO LV POSTERIOR WALL DIASTOLIC: 1.2 CM (ref 0.6–0.9)
ECHO LV RELATIVE WALL THICKNESS RATIO: 0.48
ECHO LVOT AREA: 3.8 CM2
ECHO LVOT AV VTI INDEX: 0.42
ECHO LVOT DIAM: 2.2 CM
ECHO LVOT MEAN GRADIENT: 2 MMHG
ECHO LVOT PEAK GRADIENT: 3 MMHG
ECHO LVOT PEAK VELOCITY: 0.9 M/S
ECHO LVOT STROKE VOLUME INDEX: 38.2 ML/M2
ECHO LVOT SV: 71.8 ML
ECHO LVOT VTI: 18.9 CM
ECHO MV A VELOCITY: 1.11 M/S
ECHO MV E DECELERATION TIME (DT): 258.2 MS
ECHO MV E VELOCITY: 0.5 M/S
ECHO MV E/A RATIO: 0.45
ECHO MV E/E' LATERAL: 13.93
ECHO MV E/E' RATIO (AVERAGED): 12.03
ECHO MV E/E' SEPTAL: 10.14
ECHO PULMONARY ARTERY SYSTOLIC PRESSURE (PASP): 26 MMHG
ECHO RA END SYSTOLIC VOLUME APICAL 4 CHAMBER INDEX BSA: 12 ML/M2
ECHO RA VOLUME: 23 ML
ECHO RIGHT VENTRICULAR SYSTOLIC PRESSURE (RVSP): 26 MMHG
ECHO RV FREE WALL PEAK S': 12.9 CM/S
ECHO RV INTERNAL DIMENSION: 3.1 CM
ECHO RV TAPSE: 2.7 CM (ref 1.7–?)
ECHO TV REGURGITANT MAX VELOCITY: 2.41 M/S
ECHO TV REGURGITANT PEAK GRADIENT: 23 MMHG
EOSINOPHIL # BLD: 0.5 K/UL (ref 0–0.4)
EOSINOPHIL NFR BLD: 6 % (ref 0–5)
ERYTHROCYTE [DISTWIDTH] IN BLOOD BY AUTOMATED COUNT: 11.8 % (ref 11.6–14.5)
FENTANYL: NEGATIVE
GLOBULIN SER CALC-MCNC: 3.1 G/DL (ref 2–4)
GLUCOSE SERPL-MCNC: 121 MG/DL (ref 74–108)
HCT VFR BLD AUTO: 35.8 % (ref 35–45)
HCT VFR BLD AUTO: 37.2 % (ref 35–45)
HGB BLD-MCNC: 11.9 G/DL (ref 12–16)
HGB BLD-MCNC: 12.6 G/DL (ref 12–16)
IMM GRANULOCYTES # BLD AUTO: 0.03 K/UL (ref 0–0.04)
IMM GRANULOCYTES NFR BLD AUTO: 0.4 % (ref 0–0.5)
LYMPHOCYTES # BLD: 1.91 K/UL (ref 0.9–3.3)
LYMPHOCYTES NFR BLD: 23 % (ref 21–52)
Lab: ABNORMAL
MCH RBC QN AUTO: 30.7 PG (ref 24–34)
MCHC RBC AUTO-ENTMCNC: 33.2 G/DL (ref 31–37)
MCV RBC AUTO: 92.5 FL (ref 78–100)
METHADONE UR QL: NEGATIVE
MONOCYTES # BLD: 0.8 K/UL (ref 0.05–1.2)
MONOCYTES NFR BLD: 9.6 % (ref 3–10)
NEUTS SEG # BLD: 5.02 K/UL (ref 1.8–8)
NEUTS SEG NFR BLD: 60.3 % (ref 40–73)
NRBC # BLD: 0 K/UL (ref 0–0.01)
NRBC BLD-RTO: 0 PER 100 WBC
OPIATES UR QL: NEGATIVE
OXYCODONE UR QL SCN: POSITIVE
PLATELET # BLD AUTO: 382 K/UL (ref 135–420)
PMV BLD AUTO: 10.7 FL (ref 9.2–11.8)
POTASSIUM SERPL-SCNC: 4 MMOL/L (ref 3.5–5.5)
PROT SERPL-MCNC: 6.1 G/DL (ref 6.4–8.2)
RBC # BLD AUTO: 3.87 M/UL (ref 4.2–5.3)
SODIUM SERPL-SCNC: 140 MMOL/L (ref 136–145)
TROPONIN T SERPL HS-MCNC: 16.5 NG/L (ref 0–14)
TROPONIN T SERPL HS-MCNC: 17.2 NG/L (ref 0–14)
TROPONIN T SERPL HS-MCNC: 18.7 NG/L (ref 0–14)
TROPONIN T SERPL HS-MCNC: 20.1 NG/L (ref 0–14)
WBC # BLD AUTO: 8.3 K/UL (ref 4.6–13.2)

## 2025-08-29 PROCEDURE — 85025 COMPLETE CBC W/AUTO DIFF WBC: CPT

## 2025-08-29 PROCEDURE — 6370000000 HC RX 637 (ALT 250 FOR IP): Performed by: HOSPITALIST

## 2025-08-29 PROCEDURE — 36415 COLL VENOUS BLD VENIPUNCTURE: CPT

## 2025-08-29 PROCEDURE — 80053 COMPREHEN METABOLIC PANEL: CPT

## 2025-08-29 PROCEDURE — 85730 THROMBOPLASTIN TIME PARTIAL: CPT

## 2025-08-29 PROCEDURE — 85014 HEMATOCRIT: CPT

## 2025-08-29 PROCEDURE — 84484 ASSAY OF TROPONIN QUANT: CPT

## 2025-08-29 PROCEDURE — 6370000000 HC RX 637 (ALT 250 FOR IP): Performed by: INTERNAL MEDICINE

## 2025-08-29 PROCEDURE — 2580000003 HC RX 258: Performed by: HOSPITALIST

## 2025-08-29 PROCEDURE — 6360000002 HC RX W HCPCS: Performed by: HOSPITALIST

## 2025-08-29 PROCEDURE — 93005 ELECTROCARDIOGRAM TRACING: CPT | Performed by: EMERGENCY MEDICINE

## 2025-08-29 PROCEDURE — 99285 EMERGENCY DEPT VISIT HI MDM: CPT

## 2025-08-29 PROCEDURE — 6360000004 HC RX CONTRAST MEDICATION: Performed by: HOSPITALIST

## 2025-08-29 PROCEDURE — 1100000003 HC PRIVATE W/ TELEMETRY

## 2025-08-29 PROCEDURE — 71275 CT ANGIOGRAPHY CHEST: CPT

## 2025-08-29 PROCEDURE — 85018 HEMOGLOBIN: CPT

## 2025-08-29 PROCEDURE — 96374 THER/PROPH/DIAG INJ IV PUSH: CPT

## 2025-08-29 PROCEDURE — C8929 TTE W OR WO FOL WCON,DOPPLER: HCPCS

## 2025-08-29 PROCEDURE — 71045 X-RAY EXAM CHEST 1 VIEW: CPT

## 2025-08-29 PROCEDURE — 6360000004 HC RX CONTRAST MEDICATION: Performed by: EMERGENCY MEDICINE

## 2025-08-29 PROCEDURE — 80061 LIPID PANEL: CPT

## 2025-08-29 PROCEDURE — 93970 EXTREMITY STUDY: CPT

## 2025-08-29 PROCEDURE — 6360000002 HC RX W HCPCS: Performed by: EMERGENCY MEDICINE

## 2025-08-29 PROCEDURE — 80307 DRUG TEST PRSMV CHEM ANLYZR: CPT

## 2025-08-29 PROCEDURE — 74174 CTA ABD&PLVS W/CONTRAST: CPT

## 2025-08-29 RX ORDER — IOPAMIDOL 755 MG/ML
100 INJECTION, SOLUTION INTRAVASCULAR
Status: COMPLETED | OUTPATIENT
Start: 2025-08-29 | End: 2025-08-29

## 2025-08-29 RX ORDER — SACUBITRIL AND VALSARTAN 49; 51 MG/1; MG/1
0.5 TABLET ORAL 2 TIMES DAILY
Status: DISCONTINUED | OUTPATIENT
Start: 2025-08-29 | End: 2025-08-31 | Stop reason: HOSPADM

## 2025-08-29 RX ORDER — SODIUM CHLORIDE 0.9 % (FLUSH) 0.9 %
5-40 SYRINGE (ML) INJECTION PRN
Status: DISCONTINUED | OUTPATIENT
Start: 2025-08-29 | End: 2025-08-31 | Stop reason: HOSPADM

## 2025-08-29 RX ORDER — POLYETHYLENE GLYCOL 3350 17 G/17G
17 POWDER, FOR SOLUTION ORAL 2 TIMES DAILY
Status: DISCONTINUED | OUTPATIENT
Start: 2025-08-29 | End: 2025-08-31 | Stop reason: HOSPADM

## 2025-08-29 RX ORDER — HEPARIN SODIUM 1000 [USP'U]/ML
80 INJECTION, SOLUTION INTRAVENOUS; SUBCUTANEOUS PRN
Status: DISCONTINUED | OUTPATIENT
Start: 2025-08-29 | End: 2025-08-30

## 2025-08-29 RX ORDER — CARVEDILOL 12.5 MG/1
12.5 TABLET ORAL 2 TIMES DAILY
Status: DISCONTINUED | OUTPATIENT
Start: 2025-08-29 | End: 2025-08-31 | Stop reason: HOSPADM

## 2025-08-29 RX ORDER — SODIUM CHLORIDE 0.9 % (FLUSH) 0.9 %
5-40 SYRINGE (ML) INJECTION EVERY 12 HOURS SCHEDULED
Status: DISCONTINUED | OUTPATIENT
Start: 2025-08-29 | End: 2025-08-31 | Stop reason: HOSPADM

## 2025-08-29 RX ORDER — NITROGLYCERIN 0.4 MG/1
0.4 TABLET SUBLINGUAL EVERY 5 MIN PRN
Status: DISCONTINUED | OUTPATIENT
Start: 2025-08-29 | End: 2025-08-31 | Stop reason: HOSPADM

## 2025-08-29 RX ORDER — DOCUSATE SODIUM 100 MG/1
200 CAPSULE, LIQUID FILLED ORAL DAILY
Status: DISCONTINUED | OUTPATIENT
Start: 2025-08-29 | End: 2025-08-31 | Stop reason: HOSPADM

## 2025-08-29 RX ORDER — HYDROMORPHONE HYDROCHLORIDE 1 MG/ML
0.5 INJECTION, SOLUTION INTRAMUSCULAR; INTRAVENOUS; SUBCUTANEOUS EVERY 4 HOURS PRN
Status: DISCONTINUED | OUTPATIENT
Start: 2025-08-29 | End: 2025-08-31 | Stop reason: HOSPADM

## 2025-08-29 RX ORDER — HEPARIN SODIUM 1000 [USP'U]/ML
40 INJECTION, SOLUTION INTRAVENOUS; SUBCUTANEOUS PRN
Status: DISCONTINUED | OUTPATIENT
Start: 2025-08-29 | End: 2025-08-30

## 2025-08-29 RX ORDER — HEPARIN SODIUM 10000 [USP'U]/100ML
5-30 INJECTION, SOLUTION INTRAVENOUS CONTINUOUS
Status: DISCONTINUED | OUTPATIENT
Start: 2025-08-29 | End: 2025-08-30

## 2025-08-29 RX ORDER — MORPHINE SULFATE 2 MG/ML
1 INJECTION, SOLUTION INTRAMUSCULAR; INTRAVENOUS EVERY 4 HOURS PRN
Refills: 0 | Status: DISCONTINUED | OUTPATIENT
Start: 2025-08-29 | End: 2025-08-31 | Stop reason: HOSPADM

## 2025-08-29 RX ORDER — ACETAMINOPHEN 325 MG/1
650 TABLET ORAL EVERY 6 HOURS PRN
Status: DISCONTINUED | OUTPATIENT
Start: 2025-08-29 | End: 2025-08-31 | Stop reason: HOSPADM

## 2025-08-29 RX ORDER — HEPARIN SODIUM 1000 [USP'U]/ML
80 INJECTION, SOLUTION INTRAVENOUS; SUBCUTANEOUS ONCE
Status: COMPLETED | OUTPATIENT
Start: 2025-08-29 | End: 2025-08-29

## 2025-08-29 RX ORDER — OXYCODONE AND ACETAMINOPHEN 5; 325 MG/1; MG/1
1 TABLET ORAL EVERY 4 HOURS PRN
Refills: 0 | Status: DISCONTINUED | OUTPATIENT
Start: 2025-08-29 | End: 2025-08-31 | Stop reason: HOSPADM

## 2025-08-29 RX ORDER — ZOLPIDEM TARTRATE 5 MG/1
10 TABLET ORAL NIGHTLY PRN
Status: DISCONTINUED | OUTPATIENT
Start: 2025-08-29 | End: 2025-08-31 | Stop reason: HOSPADM

## 2025-08-29 RX ORDER — FENOFIBRATE 54 MG/1
54 TABLET ORAL DAILY
Status: DISCONTINUED | OUTPATIENT
Start: 2025-08-29 | End: 2025-08-31 | Stop reason: HOSPADM

## 2025-08-29 RX ORDER — AMLODIPINE BESYLATE 5 MG/1
5 TABLET ORAL DAILY
COMMUNITY

## 2025-08-29 RX ORDER — NALOXONE HYDROCHLORIDE 0.4 MG/ML
0.4 INJECTION, SOLUTION INTRAMUSCULAR; INTRAVENOUS; SUBCUTANEOUS PRN
Status: DISCONTINUED | OUTPATIENT
Start: 2025-08-29 | End: 2025-08-31 | Stop reason: HOSPADM

## 2025-08-29 RX ORDER — ONDANSETRON 2 MG/ML
4 INJECTION INTRAMUSCULAR; INTRAVENOUS EVERY 6 HOURS PRN
Status: DISCONTINUED | OUTPATIENT
Start: 2025-08-29 | End: 2025-08-31 | Stop reason: HOSPADM

## 2025-08-29 RX ORDER — ONDANSETRON 4 MG/1
4 TABLET, ORALLY DISINTEGRATING ORAL EVERY 8 HOURS PRN
Status: DISCONTINUED | OUTPATIENT
Start: 2025-08-29 | End: 2025-08-31 | Stop reason: HOSPADM

## 2025-08-29 RX ORDER — ATORVASTATIN CALCIUM 20 MG/1
80 TABLET, FILM COATED ORAL NIGHTLY
Status: DISCONTINUED | OUTPATIENT
Start: 2025-08-29 | End: 2025-08-31 | Stop reason: HOSPADM

## 2025-08-29 RX ORDER — ACETAMINOPHEN 650 MG/1
650 SUPPOSITORY RECTAL EVERY 6 HOURS PRN
Status: DISCONTINUED | OUTPATIENT
Start: 2025-08-29 | End: 2025-08-31 | Stop reason: HOSPADM

## 2025-08-29 RX ORDER — POLYETHYLENE GLYCOL 3350 17 G/17G
17 POWDER, FOR SOLUTION ORAL DAILY PRN
Status: DISCONTINUED | OUTPATIENT
Start: 2025-08-29 | End: 2025-08-31 | Stop reason: HOSPADM

## 2025-08-29 RX ORDER — OXYCODONE AND ACETAMINOPHEN 5; 325 MG/1; MG/1
2 TABLET ORAL EVERY 6 HOURS PRN
Status: ON HOLD | COMMUNITY
End: 2025-08-31

## 2025-08-29 RX ORDER — PANTOPRAZOLE SODIUM 40 MG/1
40 TABLET, DELAYED RELEASE ORAL
Status: DISCONTINUED | OUTPATIENT
Start: 2025-08-30 | End: 2025-08-31 | Stop reason: HOSPADM

## 2025-08-29 RX ORDER — SODIUM CHLORIDE 9 MG/ML
INJECTION, SOLUTION INTRAVENOUS CONTINUOUS
Status: DISCONTINUED | OUTPATIENT
Start: 2025-08-29 | End: 2025-08-29

## 2025-08-29 RX ORDER — ASPIRIN 81 MG/1
81 TABLET, CHEWABLE ORAL DAILY
Status: DISCONTINUED | OUTPATIENT
Start: 2025-08-29 | End: 2025-08-31 | Stop reason: HOSPADM

## 2025-08-29 RX ORDER — SODIUM CHLORIDE 9 MG/ML
INJECTION, SOLUTION INTRAVENOUS PRN
Status: DISCONTINUED | OUTPATIENT
Start: 2025-08-29 | End: 2025-08-31 | Stop reason: HOSPADM

## 2025-08-29 RX ADMIN — OXYCODONE AND ACETAMINOPHEN 1 TABLET: 5; 325 TABLET ORAL at 23:33

## 2025-08-29 RX ADMIN — CARVEDILOL 12.5 MG: 12.5 TABLET, FILM COATED ORAL at 20:29

## 2025-08-29 RX ADMIN — SODIUM CHLORIDE: 0.9 INJECTION, SOLUTION INTRAVENOUS at 16:51

## 2025-08-29 RX ADMIN — IOPAMIDOL 96 ML: 755 INJECTION, SOLUTION INTRAVENOUS at 16:27

## 2025-08-29 RX ADMIN — SACUBITRIL AND VALSARTAN 0.5 TABLET: 49; 51 TABLET, FILM COATED ORAL at 12:43

## 2025-08-29 RX ADMIN — SACUBITRIL AND VALSARTAN 0.5 TABLET: 49; 51 TABLET, FILM COATED ORAL at 20:29

## 2025-08-29 RX ADMIN — SULFUR HEXAFLUORIDE 2 ML: 60.7; .19; .19 INJECTION, POWDER, LYOPHILIZED, FOR SUSPENSION INTRAVENOUS; INTRAVESICAL at 14:23

## 2025-08-29 RX ADMIN — IOPAMIDOL 70 ML: 755 INJECTION, SOLUTION INTRAVENOUS at 08:54

## 2025-08-29 RX ADMIN — ATORVASTATIN CALCIUM 80 MG: 20 TABLET, FILM COATED ORAL at 20:29

## 2025-08-29 RX ADMIN — OXYCODONE AND ACETAMINOPHEN 1 TABLET: 5; 325 TABLET ORAL at 19:25

## 2025-08-29 RX ADMIN — HYDROMORPHONE HYDROCHLORIDE 0.5 MG: 1 INJECTION, SOLUTION INTRAMUSCULAR; INTRAVENOUS; SUBCUTANEOUS at 16:46

## 2025-08-29 RX ADMIN — OXYCODONE AND ACETAMINOPHEN 1 TABLET: 5; 325 TABLET ORAL at 11:07

## 2025-08-29 RX ADMIN — ZOLPIDEM TARTRATE 10 MG: 5 TABLET ORAL at 23:33

## 2025-08-29 RX ADMIN — HEPARIN SODIUM 6200 UNITS: 1000 INJECTION, SOLUTION INTRAVENOUS; SUBCUTANEOUS at 10:16

## 2025-08-29 RX ADMIN — OXYCODONE AND ACETAMINOPHEN 1 TABLET: 5; 325 TABLET ORAL at 15:13

## 2025-08-29 RX ADMIN — CARVEDILOL 12.5 MG: 12.5 TABLET, FILM COATED ORAL at 12:43

## 2025-08-29 RX ADMIN — HEPARIN SODIUM 18 UNITS/KG/HR: 10000 INJECTION, SOLUTION INTRAVENOUS at 10:20

## 2025-08-29 RX ADMIN — ASPIRIN 81 MG: 81 TABLET, CHEWABLE ORAL at 12:44

## 2025-08-29 RX ADMIN — FENOFIBRATE 54 MG: 54 TABLET ORAL at 12:43

## 2025-08-29 ASSESSMENT — PAIN DESCRIPTION - LOCATION
LOCATION: CHEST
LOCATION: LEG;BACK
LOCATION: BACK;LEG

## 2025-08-29 ASSESSMENT — PAIN SCALES - GENERAL
PAINLEVEL_OUTOF10: 5
PAINLEVEL_OUTOF10: 7
PAINLEVEL_OUTOF10: 7

## 2025-08-29 ASSESSMENT — PAIN - FUNCTIONAL ASSESSMENT
PAIN_FUNCTIONAL_ASSESSMENT: 0-10
PAIN_FUNCTIONAL_ASSESSMENT: 0-10
PAIN_FUNCTIONAL_ASSESSMENT: PREVENTS OR INTERFERES SOME ACTIVE ACTIVITIES AND ADLS
PAIN_FUNCTIONAL_ASSESSMENT: 0-10

## 2025-08-29 ASSESSMENT — PAIN DESCRIPTION - DESCRIPTORS: DESCRIPTORS: SHARP;THROBBING

## 2025-08-29 ASSESSMENT — PAIN DESCRIPTION - ORIENTATION
ORIENTATION: LEFT;LOWER
ORIENTATION: LEFT

## 2025-08-30 LAB
ANION GAP SERPL CALC-SCNC: 13 MMOL/L (ref 3–18)
APTT PPP: 50.5 SEC (ref 21.7–34.2)
APTT PPP: >180 SEC (ref 21.7–34.2)
APTT PPP: >180 SEC (ref 21.7–34.2)
BASOPHILS # BLD: 0.04 K/UL (ref 0–0.1)
BASOPHILS NFR BLD: 0.5 % (ref 0–2)
BUN SERPL-MCNC: 21 MG/DL (ref 6–23)
BUN/CREAT SERPL: 23 (ref 12–20)
CALCIUM SERPL-MCNC: 8.9 MG/DL (ref 8.5–10.1)
CHLORIDE SERPL-SCNC: 107 MMOL/L (ref 98–107)
CHOLEST SERPL-MCNC: 85 MG/DL
CO2 SERPL-SCNC: 20 MMOL/L (ref 21–32)
CREAT SERPL-MCNC: 0.9 MG/DL (ref 0.6–1.3)
DIFFERENTIAL METHOD BLD: ABNORMAL
EKG ATRIAL RATE: 68 BPM
EKG DIAGNOSIS: NORMAL
EKG P AXIS: 44 DEGREES
EKG P-R INTERVAL: 204 MS
EKG Q-T INTERVAL: 460 MS
EKG QRS DURATION: 162 MS
EKG QTC CALCULATION (BAZETT): 489 MS
EKG R AXIS: -60 DEGREES
EKG T AXIS: 111 DEGREES
EKG VENTRICULAR RATE: 68 BPM
EOSINOPHIL # BLD: 0.45 K/UL (ref 0–0.4)
EOSINOPHIL NFR BLD: 5.4 % (ref 0–5)
ERYTHROCYTE [DISTWIDTH] IN BLOOD BY AUTOMATED COUNT: 11.7 % (ref 11.6–14.5)
GLUCOSE SERPL-MCNC: 130 MG/DL (ref 74–108)
HCT VFR BLD AUTO: 34.5 % (ref 35–45)
HCT VFR BLD AUTO: 34.5 % (ref 35–45)
HCT VFR BLD AUTO: 35.5 % (ref 35–45)
HCT VFR BLD AUTO: 35.7 % (ref 35–45)
HDLC SERPL-MCNC: 46 MG/DL (ref 40–60)
HDLC SERPL: 1.8 (ref 0–5)
HGB BLD-MCNC: 11.6 G/DL (ref 12–16)
HGB BLD-MCNC: 11.9 G/DL (ref 12–16)
HGB BLD-MCNC: 12.2 G/DL (ref 12–16)
HGB BLD-MCNC: 12.3 G/DL (ref 12–16)
IMM GRANULOCYTES # BLD AUTO: 0.04 K/UL (ref 0–0.04)
IMM GRANULOCYTES NFR BLD AUTO: 0.5 % (ref 0–0.5)
LDLC SERPL CALC-MCNC: 25 MG/DL (ref 0–100)
LYMPHOCYTES # BLD: 2.41 K/UL (ref 0.9–3.3)
LYMPHOCYTES NFR BLD: 29.1 % (ref 21–52)
MAGNESIUM SERPL-MCNC: 2.1 MG/DL (ref 1.6–2.6)
MCH RBC QN AUTO: 30.9 PG (ref 24–34)
MCHC RBC AUTO-ENTMCNC: 33.6 G/DL (ref 31–37)
MCV RBC AUTO: 92 FL (ref 78–100)
MONOCYTES # BLD: 0.78 K/UL (ref 0.05–1.2)
MONOCYTES NFR BLD: 9.4 % (ref 3–10)
NEUTS SEG # BLD: 4.55 K/UL (ref 1.8–8)
NEUTS SEG NFR BLD: 55.1 % (ref 40–73)
NRBC # BLD: 0 K/UL (ref 0–0.01)
NRBC BLD-RTO: 0 PER 100 WBC
PLATELET # BLD AUTO: 355 K/UL (ref 135–420)
PMV BLD AUTO: 10.8 FL (ref 9.2–11.8)
POTASSIUM SERPL-SCNC: 3.5 MMOL/L (ref 3.5–5.5)
RBC # BLD AUTO: 3.75 M/UL (ref 4.2–5.3)
SODIUM SERPL-SCNC: 140 MMOL/L (ref 136–145)
TRIGL SERPL-MCNC: 71 MG/DL (ref 0–150)
TROPONIN T SERPL HS-MCNC: 17.1 NG/L (ref 0–14)
VLDLC SERPL CALC-MCNC: 14 MG/DL
WBC # BLD AUTO: 8.3 K/UL (ref 4.6–13.2)

## 2025-08-30 PROCEDURE — 1100000003 HC PRIVATE W/ TELEMETRY

## 2025-08-30 PROCEDURE — 80048 BASIC METABOLIC PNL TOTAL CA: CPT

## 2025-08-30 PROCEDURE — 6370000000 HC RX 637 (ALT 250 FOR IP): Performed by: HOSPITALIST

## 2025-08-30 PROCEDURE — 85025 COMPLETE CBC W/AUTO DIFF WBC: CPT

## 2025-08-30 PROCEDURE — 85018 HEMOGLOBIN: CPT

## 2025-08-30 PROCEDURE — 97162 PT EVAL MOD COMPLEX 30 MIN: CPT

## 2025-08-30 PROCEDURE — 36415 COLL VENOUS BLD VENIPUNCTURE: CPT

## 2025-08-30 PROCEDURE — 6370000000 HC RX 637 (ALT 250 FOR IP): Performed by: INTERNAL MEDICINE

## 2025-08-30 PROCEDURE — 85014 HEMATOCRIT: CPT

## 2025-08-30 PROCEDURE — 97116 GAIT TRAINING THERAPY: CPT

## 2025-08-30 PROCEDURE — 84484 ASSAY OF TROPONIN QUANT: CPT

## 2025-08-30 PROCEDURE — 6360000002 HC RX W HCPCS: Performed by: EMERGENCY MEDICINE

## 2025-08-30 PROCEDURE — 6360000002 HC RX W HCPCS: Performed by: HOSPITALIST

## 2025-08-30 PROCEDURE — 2500000003 HC RX 250 WO HCPCS: Performed by: HOSPITALIST

## 2025-08-30 PROCEDURE — 85730 THROMBOPLASTIN TIME PARTIAL: CPT

## 2025-08-30 PROCEDURE — 83735 ASSAY OF MAGNESIUM: CPT

## 2025-08-30 RX ORDER — CLOPIDOGREL BISULFATE 75 MG/1
75 TABLET ORAL DAILY
Status: DISCONTINUED | OUTPATIENT
Start: 2025-08-30 | End: 2025-08-30

## 2025-08-30 RX ADMIN — CLOPIDOGREL BISULFATE 75 MG: 75 TABLET, FILM COATED ORAL at 13:56

## 2025-08-30 RX ADMIN — SODIUM CHLORIDE, PRESERVATIVE FREE 10 ML: 5 INJECTION INTRAVENOUS at 20:53

## 2025-08-30 RX ADMIN — FENOFIBRATE 54 MG: 54 TABLET ORAL at 09:37

## 2025-08-30 RX ADMIN — ACETAMINOPHEN 650 MG: 325 TABLET ORAL at 13:53

## 2025-08-30 RX ADMIN — OXYCODONE AND ACETAMINOPHEN 1 TABLET: 5; 325 TABLET ORAL at 05:13

## 2025-08-30 RX ADMIN — PANTOPRAZOLE SODIUM 40 MG: 40 TABLET, DELAYED RELEASE ORAL at 05:12

## 2025-08-30 RX ADMIN — APIXABAN 5 MG: 5 TABLET, FILM COATED ORAL at 17:45

## 2025-08-30 RX ADMIN — HYDROMORPHONE HYDROCHLORIDE 0.5 MG: 1 INJECTION, SOLUTION INTRAMUSCULAR; INTRAVENOUS; SUBCUTANEOUS at 06:11

## 2025-08-30 RX ADMIN — OXYCODONE AND ACETAMINOPHEN 1 TABLET: 5; 325 TABLET ORAL at 11:10

## 2025-08-30 RX ADMIN — SACUBITRIL AND VALSARTAN 0.5 TABLET: 49; 51 TABLET, FILM COATED ORAL at 09:37

## 2025-08-30 RX ADMIN — ATORVASTATIN CALCIUM 80 MG: 20 TABLET, FILM COATED ORAL at 20:51

## 2025-08-30 RX ADMIN — HYDROMORPHONE HYDROCHLORIDE 0.5 MG: 1 INJECTION, SOLUTION INTRAMUSCULAR; INTRAVENOUS; SUBCUTANEOUS at 19:41

## 2025-08-30 RX ADMIN — SACUBITRIL AND VALSARTAN 0.5 TABLET: 49; 51 TABLET, FILM COATED ORAL at 20:52

## 2025-08-30 RX ADMIN — HEPARIN SODIUM 6200 UNITS: 1000 INJECTION, SOLUTION INTRAVENOUS; SUBCUTANEOUS at 03:58

## 2025-08-30 RX ADMIN — SODIUM CHLORIDE, PRESERVATIVE FREE 10 ML: 5 INJECTION INTRAVENOUS at 09:38

## 2025-08-30 RX ADMIN — HEPARIN SODIUM 19 UNITS/KG/HR: 10000 INJECTION, SOLUTION INTRAVENOUS at 09:45

## 2025-08-30 RX ADMIN — ASPIRIN 81 MG: 81 TABLET, CHEWABLE ORAL at 09:37

## 2025-08-30 RX ADMIN — OXYCODONE AND ACETAMINOPHEN 1 TABLET: 5; 325 TABLET ORAL at 20:53

## 2025-08-30 RX ADMIN — ZOLPIDEM TARTRATE 10 MG: 5 TABLET ORAL at 20:52

## 2025-08-30 RX ADMIN — CARVEDILOL 12.5 MG: 12.5 TABLET, FILM COATED ORAL at 09:37

## 2025-08-30 RX ADMIN — HYDROMORPHONE HYDROCHLORIDE 0.5 MG: 1 INJECTION, SOLUTION INTRAMUSCULAR; INTRAVENOUS; SUBCUTANEOUS at 23:58

## 2025-08-30 RX ADMIN — CARVEDILOL 12.5 MG: 12.5 TABLET, FILM COATED ORAL at 20:53

## 2025-08-30 ASSESSMENT — PAIN - FUNCTIONAL ASSESSMENT
PAIN_FUNCTIONAL_ASSESSMENT: 0-10
PAIN_FUNCTIONAL_ASSESSMENT: PREVENTS OR INTERFERES SOME ACTIVE ACTIVITIES AND ADLS
PAIN_FUNCTIONAL_ASSESSMENT: PREVENTS OR INTERFERES SOME ACTIVE ACTIVITIES AND ADLS
PAIN_FUNCTIONAL_ASSESSMENT: 0-10
PAIN_FUNCTIONAL_ASSESSMENT: PREVENTS OR INTERFERES SOME ACTIVE ACTIVITIES AND ADLS
PAIN_FUNCTIONAL_ASSESSMENT: PREVENTS OR INTERFERES SOME ACTIVE ACTIVITIES AND ADLS
PAIN_FUNCTIONAL_ASSESSMENT: 0-10
PAIN_FUNCTIONAL_ASSESSMENT: PREVENTS OR INTERFERES SOME ACTIVE ACTIVITIES AND ADLS
PAIN_FUNCTIONAL_ASSESSMENT: 0-10
PAIN_FUNCTIONAL_ASSESSMENT: 0-10

## 2025-08-30 ASSESSMENT — PAIN DESCRIPTION - ORIENTATION
ORIENTATION: LEFT;MID;LOWER
ORIENTATION: LEFT;MID;LOWER
ORIENTATION: MID;LOWER
ORIENTATION: LOWER;MID
ORIENTATION: MID;LOWER

## 2025-08-30 ASSESSMENT — PAIN SCALES - GENERAL
PAINLEVEL_OUTOF10: 10
PAINLEVEL_OUTOF10: 3
PAINLEVEL_OUTOF10: 0
PAINLEVEL_OUTOF10: 9
PAINLEVEL_OUTOF10: 5
PAINLEVEL_OUTOF10: 10
PAINLEVEL_OUTOF10: 9
PAINLEVEL_OUTOF10: 3
PAINLEVEL_OUTOF10: 10
PAINLEVEL_OUTOF10: 8
PAINLEVEL_OUTOF10: 3
PAINLEVEL_OUTOF10: 9
PAINLEVEL_OUTOF10: 10

## 2025-08-30 ASSESSMENT — PAIN DESCRIPTION - PAIN TYPE
TYPE: SURGICAL PAIN

## 2025-08-30 ASSESSMENT — PAIN DESCRIPTION - LOCATION
LOCATION: BACK
LOCATION: BACK
LOCATION: BACK;LEG
LOCATION: BACK
LOCATION: BACK;GENERALIZED
LOCATION: BACK;LEG

## 2025-08-30 ASSESSMENT — PAIN DESCRIPTION - ONSET
ONSET: ON-GOING

## 2025-08-30 ASSESSMENT — PAIN DESCRIPTION - FREQUENCY
FREQUENCY: INTERMITTENT

## 2025-08-30 ASSESSMENT — PAIN DESCRIPTION - DESCRIPTORS: DESCRIPTORS: ACHING

## 2025-08-31 VITALS
HEIGHT: 66 IN | SYSTOLIC BLOOD PRESSURE: 127 MMHG | WEIGHT: 172 LBS | HEART RATE: 72 BPM | RESPIRATION RATE: 16 BRPM | DIASTOLIC BLOOD PRESSURE: 76 MMHG | OXYGEN SATURATION: 97 % | BODY MASS INDEX: 27.64 KG/M2 | TEMPERATURE: 97.9 F

## 2025-08-31 LAB
ANION GAP SERPL CALC-SCNC: 12 MMOL/L (ref 3–18)
BASOPHILS # BLD: 0.05 K/UL (ref 0–0.1)
BASOPHILS NFR BLD: 0.6 % (ref 0–2)
BUN SERPL-MCNC: 17 MG/DL (ref 6–23)
BUN/CREAT SERPL: 20 (ref 12–20)
CALCIUM SERPL-MCNC: 9.4 MG/DL (ref 8.5–10.1)
CHLORIDE SERPL-SCNC: 107 MMOL/L (ref 98–107)
CO2 SERPL-SCNC: 22 MMOL/L (ref 21–32)
CREAT SERPL-MCNC: 0.86 MG/DL (ref 0.6–1.3)
DIFFERENTIAL METHOD BLD: ABNORMAL
EOSINOPHIL # BLD: 0.43 K/UL (ref 0–0.4)
EOSINOPHIL NFR BLD: 5.4 % (ref 0–5)
ERYTHROCYTE [DISTWIDTH] IN BLOOD BY AUTOMATED COUNT: 11.8 % (ref 11.6–14.5)
GLUCOSE SERPL-MCNC: 100 MG/DL (ref 74–108)
HCT VFR BLD AUTO: 34.4 % (ref 35–45)
HCT VFR BLD AUTO: 36.7 % (ref 35–45)
HGB BLD-MCNC: 11.8 G/DL (ref 12–16)
HGB BLD-MCNC: 12.4 G/DL (ref 12–16)
IMM GRANULOCYTES # BLD AUTO: 0.02 K/UL (ref 0–0.04)
IMM GRANULOCYTES NFR BLD AUTO: 0.3 % (ref 0–0.5)
LYMPHOCYTES # BLD: 2.13 K/UL (ref 0.9–3.3)
LYMPHOCYTES NFR BLD: 26.9 % (ref 21–52)
MAGNESIUM SERPL-MCNC: 2.2 MG/DL (ref 1.6–2.6)
MCH RBC QN AUTO: 31.2 PG (ref 24–34)
MCHC RBC AUTO-ENTMCNC: 33.8 G/DL (ref 31–37)
MCV RBC AUTO: 92.4 FL (ref 78–100)
MONOCYTES # BLD: 0.88 K/UL (ref 0.05–1.2)
MONOCYTES NFR BLD: 11.1 % (ref 3–10)
NEUTS SEG # BLD: 4.42 K/UL (ref 1.8–8)
NEUTS SEG NFR BLD: 55.7 % (ref 40–73)
NRBC # BLD: 0 K/UL (ref 0–0.01)
NRBC BLD-RTO: 0 PER 100 WBC
PLATELET # BLD AUTO: 350 K/UL (ref 135–420)
PMV BLD AUTO: 10.8 FL (ref 9.2–11.8)
POTASSIUM SERPL-SCNC: 3.4 MMOL/L (ref 3.5–5.5)
RBC # BLD AUTO: 3.97 M/UL (ref 4.2–5.3)
SODIUM SERPL-SCNC: 141 MMOL/L (ref 136–145)
WBC # BLD AUTO: 7.9 K/UL (ref 4.6–13.2)

## 2025-08-31 PROCEDURE — 83735 ASSAY OF MAGNESIUM: CPT

## 2025-08-31 PROCEDURE — 36415 COLL VENOUS BLD VENIPUNCTURE: CPT

## 2025-08-31 PROCEDURE — 6370000000 HC RX 637 (ALT 250 FOR IP): Performed by: INTERNAL MEDICINE

## 2025-08-31 PROCEDURE — 85014 HEMATOCRIT: CPT

## 2025-08-31 PROCEDURE — 85025 COMPLETE CBC W/AUTO DIFF WBC: CPT

## 2025-08-31 PROCEDURE — 6360000002 HC RX W HCPCS: Performed by: HOSPITALIST

## 2025-08-31 PROCEDURE — 80048 BASIC METABOLIC PNL TOTAL CA: CPT

## 2025-08-31 PROCEDURE — 85018 HEMOGLOBIN: CPT

## 2025-08-31 PROCEDURE — 2500000003 HC RX 250 WO HCPCS: Performed by: HOSPITALIST

## 2025-08-31 PROCEDURE — 6370000000 HC RX 637 (ALT 250 FOR IP): Performed by: HOSPITALIST

## 2025-08-31 RX ORDER — POTASSIUM CHLORIDE 1500 MG/1
40 TABLET, EXTENDED RELEASE ORAL ONCE
Status: COMPLETED | OUTPATIENT
Start: 2025-08-31 | End: 2025-08-31

## 2025-08-31 RX ORDER — ZOLPIDEM TARTRATE 10 MG/1
10 TABLET ORAL NIGHTLY
Qty: 1 TABLET | Refills: 0 | Status: SHIPPED | OUTPATIENT
Start: 2025-08-31 | End: 2025-09-01

## 2025-08-31 RX ORDER — OXYCODONE AND ACETAMINOPHEN 5; 325 MG/1; MG/1
2 TABLET ORAL EVERY 6 HOURS PRN
Qty: 4 TABLET | Refills: 0 | Status: SHIPPED | OUTPATIENT
Start: 2025-08-31 | End: 2025-09-01

## 2025-08-31 RX ORDER — PANTOPRAZOLE SODIUM 40 MG/1
40 TABLET, DELAYED RELEASE ORAL
Qty: 30 TABLET | Refills: 0
Start: 2025-09-01

## 2025-08-31 RX ADMIN — MORPHINE SULFATE 1 MG: 2 INJECTION, SOLUTION INTRAMUSCULAR; INTRAVENOUS at 03:07

## 2025-08-31 RX ADMIN — HYDROMORPHONE HYDROCHLORIDE 0.5 MG: 1 INJECTION, SOLUTION INTRAMUSCULAR; INTRAVENOUS; SUBCUTANEOUS at 04:14

## 2025-08-31 RX ADMIN — OXYCODONE AND ACETAMINOPHEN 1 TABLET: 5; 325 TABLET ORAL at 12:03

## 2025-08-31 RX ADMIN — OXYCODONE AND ACETAMINOPHEN 1 TABLET: 5; 325 TABLET ORAL at 06:51

## 2025-08-31 RX ADMIN — PANTOPRAZOLE SODIUM 40 MG: 40 TABLET, DELAYED RELEASE ORAL at 06:51

## 2025-08-31 RX ADMIN — ASPIRIN 81 MG: 81 TABLET, CHEWABLE ORAL at 09:10

## 2025-08-31 RX ADMIN — SODIUM CHLORIDE, PRESERVATIVE FREE 10 ML: 5 INJECTION INTRAVENOUS at 09:10

## 2025-08-31 RX ADMIN — APIXABAN 5 MG: 5 TABLET, FILM COATED ORAL at 09:09

## 2025-08-31 RX ADMIN — FENOFIBRATE 54 MG: 54 TABLET ORAL at 09:09

## 2025-08-31 RX ADMIN — SACUBITRIL AND VALSARTAN 0.5 TABLET: 49; 51 TABLET, FILM COATED ORAL at 09:09

## 2025-08-31 RX ADMIN — CARVEDILOL 12.5 MG: 12.5 TABLET, FILM COATED ORAL at 09:09

## 2025-08-31 RX ADMIN — POTASSIUM CHLORIDE 40 MEQ: 1500 TABLET, EXTENDED RELEASE ORAL at 12:03

## 2025-08-31 ASSESSMENT — PAIN DESCRIPTION - ORIENTATION
ORIENTATION: MID;LOWER
ORIENTATION: LEFT;POSTERIOR

## 2025-08-31 ASSESSMENT — PAIN DESCRIPTION - FREQUENCY: FREQUENCY: INTERMITTENT

## 2025-08-31 ASSESSMENT — PAIN SCALES - GENERAL
PAINLEVEL_OUTOF10: 7
PAINLEVEL_OUTOF10: 3
PAINLEVEL_OUTOF10: 10
PAINLEVEL_OUTOF10: 5
PAINLEVEL_OUTOF10: 3

## 2025-08-31 ASSESSMENT — PAIN - FUNCTIONAL ASSESSMENT
PAIN_FUNCTIONAL_ASSESSMENT: 0-10
PAIN_FUNCTIONAL_ASSESSMENT: PREVENTS OR INTERFERES SOME ACTIVE ACTIVITIES AND ADLS
PAIN_FUNCTIONAL_ASSESSMENT: 0-10

## 2025-08-31 ASSESSMENT — PAIN DESCRIPTION - DESCRIPTORS: DESCRIPTORS: ACHING

## 2025-08-31 ASSESSMENT — PAIN DESCRIPTION - LOCATION
LOCATION: BACK;LEG
LOCATION: BACK

## 2025-08-31 ASSESSMENT — PAIN DESCRIPTION - PAIN TYPE: TYPE: SURGICAL PAIN

## 2025-08-31 ASSESSMENT — PAIN DESCRIPTION - ONSET: ONSET: ON-GOING

## (undated) DEVICE — BLADE SAW SAGIT 11.5X5.5MM --

## (undated) DEVICE — Device

## (undated) DEVICE — SET DEVICE SPINAL W/CANNULA BLADE ST DISP

## (undated) DEVICE — DEVICE INFL W ACCS + HEMSTAS VLV INSRT TOOL AND TORQ BASIX

## (undated) DEVICE — INTENDED FOR TISSUE SEPARATION, AND OTHER PROCEDURES THAT REQUIRE A SHARP SURGICAL BLADE TO PUNCTURE OR CUT.: Brand: BARD-PARKER ® STAINLESS STEEL BLADES

## (undated) DEVICE — CATHETER ANGIO 4FR L100CM S STL NYL JL4 3 SEG BRAID SFT

## (undated) DEVICE — COVER US PRB W15XL120CM W/ GEL RUBBERBAND TAPE STRP FLD GEN

## (undated) DEVICE — GUIDEWIRE VASC L260CM DIA0.035IN STR TIP L7CM PTFE FIX COR

## (undated) DEVICE — SOLUTION IV 1000ML 0.9% SOD CHL

## (undated) DEVICE — BRUSH POWER TISSUE EXTRACTOR BENT

## (undated) DEVICE — GLOVE SURG SZ 85 L12IN FNGR THK79MIL GRN LTX FREE

## (undated) DEVICE — CANNULA ORIG TL CLR W FOAM CUSHIONS AND 14FT SUPL TB 3 CHN

## (undated) DEVICE — NEEDLE SPNL 22GA L3.5IN BLK HUB S STL REG WALL FIT STYL W/

## (undated) DEVICE — GARMENT,MEDLINE,DVT,SEQUENTIAL,CALF,MD: Brand: MEDLINE

## (undated) DEVICE — CATHETER GUID 6FR L100CM GRN PTFE JR4 TRUELUMEN W/ 2 SIDE H

## (undated) DEVICE — RUNTHROUGH NS EXTRA FLOPPY PTCA GUIDEWIRE: Brand: RUNTHROUGH

## (undated) DEVICE — CATHETER ANGIO 5FR L100CM GRY S STL NYL JR4 3 SEG BRAID L

## (undated) DEVICE — CATHETER GUID 6FR L100CM GRN PTFE JR4 TRUELUMEN HYBRID

## (undated) DEVICE — BANDAGE COBAN 4 IN COMPR W4INXL5YD FOAM COHESIVE QUIK STK SELF ADH SFT

## (undated) DEVICE — SUTURE MCRYL SZ 3-0 L27IN ABSRB UD L26MM SH 1/2 CIR Y416H

## (undated) DEVICE — KIT DRL BIT OD2.10MM FOR MDLLRY CNL HAMMERLOCK 2

## (undated) DEVICE — CATHETER 5FR JL4 CORDIS 100 CM

## (undated) DEVICE — Z DISCONTINUED BY MEDLINE USE 2711682 TRAY SKIN PREP DRY W/ PREM GLV

## (undated) DEVICE — GOWN,SIRUS,NONRNF,SETINSLV,XL,20/CS: Brand: MEDLINE

## (undated) DEVICE — ANGIO-SEAL VIP VASCULAR CLOSURE DEVICE: Brand: ANGIO-SEAL

## (undated) DEVICE — KIT ARMOR C DRP COLLAPSIBLE AND SELF EXP TOP CVR FOR FLUOROSCOPIC

## (undated) DEVICE — SOL IRRIGATION INJ NACL 0.9% 500ML BTL

## (undated) DEVICE — MAJOR LITHOTOMY: Brand: MEDLINE INDUSTRIES, INC.

## (undated) DEVICE — SOLUTION IRRIG 1000ML 0.9% SOD CHL USP POUR PLAS BTL

## (undated) DEVICE — DRAPE XR C ARM 41X74IN LF --

## (undated) DEVICE — PADS  DEFIB  ADULT

## (undated) DEVICE — INTRODUCER SHTH 4FR CANN L11CM DIL TIP 25MM RED TUNGSTEN

## (undated) DEVICE — SUPPORT WRST COMPR W/ HK MBRACE

## (undated) DEVICE — SUTURE VCRL SZ 2-0 L27IN ABSRB UD L26MM SH 1/2 CIR J417H

## (undated) DEVICE — SUT SLK 2-0 18IN FS BLK --

## (undated) DEVICE — DRESSING,GAUZE,XEROFORM,CURAD,1"X8",ST: Brand: CURAD

## (undated) DEVICE — FLEX ADVANTAGE 3000CC: Brand: FLEX ADVANTAGE

## (undated) DEVICE — 4FR MICROPUNCTURE KIT

## (undated) DEVICE — KIT URIN CATH 16 FR 5 CC BG W/ M INDWL SIL

## (undated) DEVICE — PACK PROCEDURE SURG VASC CATH 161 MMC LF

## (undated) DEVICE — DILATOR TWO STERILE SINGLE USE ONLY

## (undated) DEVICE — WRAP COMPR W4INXL5YD NONSTERILE TAN SELF ADH COBAN

## (undated) DEVICE — PINNACLE INTRODUCER SHEATH: Brand: PINNACLE

## (undated) DEVICE — TOWEL,OR,DSP,ST,BLUE,STD,4/PK,20PK/CS: Brand: MEDLINE

## (undated) DEVICE — SUT ETHLN 3-0 18IN PS1 BLK --

## (undated) DEVICE — CATHETER IVL C2PLUS SHOCKWAVE 3.5MM X 12MM

## (undated) DEVICE — CATHETER DIAG AD 4FR L100CM STD NYL JUDKINS R 4 TRULUMEN

## (undated) DEVICE — KIT CLN UP BON SECOURS MARYV

## (undated) DEVICE — SUTURE VCRL SZ 3-0 L18IN ABSRB UD L26MM SH 1/2 CIR J864D

## (undated) DEVICE — 6FR ANGLED PIGTAIL CATHETER 110CM

## (undated) DEVICE — PRESSURE MONITORING SET: Brand: TRUWAVE

## (undated) DEVICE — ELECTRODE PT RET AD L9FT HI MOIST COND ADH HYDRGEL CORDED

## (undated) DEVICE — BLADE SAW W9XL31MM THK038MM CUT THK043MM REPL SAG OSC THN

## (undated) DEVICE — KIT POS PUR W/ FOAM FRME JACK TBL 4 BOUF CVR PLLW FRME HD

## (undated) DEVICE — GARMENT,MEDLINE,DVT,INT,CALF,MED, GEN2: Brand: MEDLINE

## (undated) DEVICE — CATHETER DIAG 5FR L100CM LUMN ID0.047IN JL3.5 CRV 0 SIDE H

## (undated) DEVICE — BLADE SHAPER STRL DISP

## (undated) DEVICE — STERILE POLYISOPRENE POWDER-FREE SURGICAL GLOVES: Brand: PROTEXIS

## (undated) DEVICE — TUBING SUCT 10FR MAL ALUM SHFT FN CAP VENT UNIV CONN W/ OBT

## (undated) DEVICE — SUTURE 2 0 STRATAFIX SYMMETRIC PDS + 60CM CT 1 SXPP1A439

## (undated) DEVICE — SET FLD ADMIN 3 W STPCOCK FIX FEM L BOR 1IN

## (undated) DEVICE — GUIDEWIRE VASC L260CM DIA0.035IN RAD 3MM J TIP L7CM PTFE

## (undated) DEVICE — DRESSING GERM DIA1IN CNTR H DIA7MM BLU CHG ANTIMIC PROTCT

## (undated) DEVICE — ZIMMER® STERILE DISPOSABLE TOURNIQUET CUFF WITH PROTECTIVE SLEEVE AND PLC, DUAL PORT, SINGLE BLADDER, 34 IN. (86 CM)

## (undated) DEVICE — BLADE RMFG OSC COARSE 25X9MM -- LAWSON OEM ITEM 225903

## (undated) DEVICE — GLOVE SURG SZ 65 CRM LTX FREE POLYISOPRENE POLYMER BEAD ANTI

## (undated) DEVICE — 4-PORT MANIFOLD: Brand: NEPTUNE 2

## (undated) DEVICE — GLOVE SURG SZ 65 L12IN FNGR THK79MIL GRN LTX FREE

## (undated) DEVICE — PERCUTANEOUS ENTRY THINWALL NEEDLE  ONE-PART: Brand: COOK

## (undated) DEVICE — INTENDED FOR TISSUE SEPARATION, AND OTHER PROCEDURES THAT REQUIRE A SHARP SURGICAL BLADE TO PUNCTURE OR CUT.: Brand: BARD-PARKER ® CARBON RIB-BACK BLADES

## (undated) DEVICE — PROCEDURE KIT FLUID MGMT 10 FR CUST MAINFOLD

## (undated) DEVICE — SUTURE VCRL SZ 3-0 L27IN ABSRB UD L26MM SH 1/2 CIR J416H

## (undated) DEVICE — PROBE NEUROMONITORING 4MM STERILE

## (undated) DEVICE — CATHETER GUID EBU3.5 6 FRX100 CM VISTA BRT TIP

## (undated) DEVICE — APPLICATOR MEDICATED 26 CC SOLUTION HI LT ORNG CHLORAPREP

## (undated) DEVICE — KIT DRL BIT DIA2MM DRL GUID HNDL LOC PIN TAMP CORRESPONDING

## (undated) DEVICE — DRAPE,ANGIO,BRACH,STERILE,38X44: Brand: MEDLINE

## (undated) DEVICE — CATH BLLN ANGIO 3.75X15MM NC EUPHORIA RX

## (undated) DEVICE — BANDAGE,GAUZE,BULKEE II,4.5"X4.1YD,STRL: Brand: MEDLINE

## (undated) DEVICE — SUTURE MONOCRYL STRATAFIX SPRL + SZ 4-0 L12IN ABSRB UD PS-2 SXMP1B117

## (undated) DEVICE — DERMACEA GAUZE ROLL: Brand: DERMACEA

## (undated) DEVICE — REM POLYHESIVE ADULT PATIENT RETURN ELECTRODE: Brand: VALLEYLAB

## (undated) DEVICE — GAUZE,SPONGE,4"X4",16PLY,STRL,LF,10/TRAY: Brand: MEDLINE

## (undated) DEVICE — SYRINGE CTRL SPNL STPCOCK 3 W

## (undated) DEVICE — BLADE OSC SAW 40X11MM -- STRKER

## (undated) DEVICE — CATH BLLN ANGIO 2.50X12MM NC EUPHORIA RX

## (undated) DEVICE — ANGIOGRAPHY KIT CUST VASC

## (undated) DEVICE — AMD ANTIMICROBIAL SUPER SPONGES,MEDIUM: Brand: KERLIX

## (undated) DEVICE — SUTURE VICRYL SZ 2-0 L18IN ABSRB VLT CT-1 L36MM 1/2 CIR J739D

## (undated) DEVICE — C-ARMOR C-ARM EQUIPMENT COVERS CLEAR STERILE UNIVERSAL FIT 12 PER CASE: Brand: C-ARMOR

## (undated) DEVICE — CATHETER 5FR JL3.5 CORDIS 100CM

## (undated) DEVICE — SUTURE MONOCRYL SZ 3-0 L27IN ABSRB UD L24MM PS-1 3/8 CIR PRIM Y936H

## (undated) DEVICE — CATH BLLN ANGIO 2X15MM NC EUPHORIA RX

## (undated) DEVICE — DECANTER BAG 9": Brand: MEDLINE INDUSTRIES, INC.

## (undated) DEVICE — BUR SURG DIA3MM PRECIS NEURO 5 STP NOTCH EXPOSE MRK ELITE

## (undated) DEVICE — PREMIUM PERINEAL COLD PACK: Brand: CARDINAL HEALTH

## (undated) DEVICE — CATHETER ANGIO 4FR L110CM S STL NYL STR PGTL W/ 6 SIDE H

## (undated) DEVICE — GLOVE SURG SZ 8 CRM LTX FREE POLYISOPRENE POLYMER BEAD ANTI

## (undated) DEVICE — GLIDESHEATH SLENDER STAINLESS STEEL KIT: Brand: GLIDESHEATH SLENDER

## (undated) DEVICE — CATHETER INTVASC ULTRASOUND PLAT EAGLE EYE PHARM DIGITAL

## (undated) DEVICE — BLANKET WRM AD W50XL85.8IN PACU FULL BODY FORC AIR

## (undated) DEVICE — TUBE SPNL PORTAL E SEQUENTIA

## (undated) DEVICE — BAG WST COLL CLR DISP PVC W/ ROTICULATING LUER L BOR TBNG

## (undated) DEVICE — PACK SURG BSHR TOT KNEE LF

## (undated) DEVICE — CATH BLLN ANGIO 3X12MM NC EUPHORIA RX

## (undated) DEVICE — SUTURE STRATAFIX SYMMETRIC PDS + ETHIGUARD SZ 1 L18IN ABSRB SXPP1A300

## (undated) DEVICE — PREP SKN CHLRAPRP APL 26ML STR --

## (undated) DEVICE — DRESSING FOAM 4X8 IN OPTIFOAM GENTLE POSTOP

## (undated) DEVICE — CATH BLLN ANGIO 3.50X12MM NC EUPHORIA RX

## (undated) DEVICE — DRAIN SURG W7MMXL20CM SIL FULL PERF HUBLESS FLAT RADPQ STRP

## (undated) DEVICE — ADHESIVE SKIN CLOSURE WND 8.661X1.5 IN 22 CM LIQUIBAND SECUR

## (undated) DEVICE — Device: Brand: EAGLE EYE PLATINUM RX DIGITAL IVUS CATHETER